# Patient Record
Sex: MALE | Race: WHITE | Employment: OTHER
[De-identification: names, ages, dates, MRNs, and addresses within clinical notes are randomized per-mention and may not be internally consistent; named-entity substitution may affect disease eponyms.]

---

## 2017-01-23 ENCOUNTER — OFFICE VISIT (OUTPATIENT)
Dept: FAMILY MEDICINE CLINIC | Facility: CLINIC | Age: 57
End: 2017-01-23

## 2017-01-23 VITALS
BODY MASS INDEX: 34.36 KG/M2 | DIASTOLIC BLOOD PRESSURE: 100 MMHG | WEIGHT: 232 LBS | SYSTOLIC BLOOD PRESSURE: 150 MMHG | HEIGHT: 69 IN

## 2017-01-23 DIAGNOSIS — M25.841 CYST OF JOINT OF HAND, RIGHT: Primary | ICD-10-CM

## 2017-01-23 DIAGNOSIS — M25.511 ACUTE PAIN OF RIGHT SHOULDER: ICD-10-CM

## 2017-01-23 PROCEDURE — 99203 OFFICE O/P NEW LOW 30 MIN: CPT | Performed by: FAMILY MEDICINE

## 2017-01-23 RX ORDER — TRAZODONE HYDROCHLORIDE 50 MG/1
50 TABLET ORAL NIGHTLY
COMMUNITY
End: 2017-09-27 | Stop reason: SDUPTHER

## 2017-01-23 RX ORDER — METHYLPREDNISOLONE 4 MG/1
TABLET ORAL
Qty: 1 KIT | Refills: 0 | Status: SHIPPED | OUTPATIENT
Start: 2017-01-23 | End: 2017-01-29

## 2017-01-23 RX ORDER — BACLOFEN 10 MG/1
10 TABLET ORAL 2 TIMES DAILY
Qty: 180 TABLET | Refills: 1 | Status: SHIPPED | OUTPATIENT
Start: 2017-01-23 | End: 2017-08-15 | Stop reason: SDUPTHER

## 2017-01-23 RX ORDER — FEXOFENADINE HCL AND PSEUDOEPHEDRINE HCI 60; 120 MG/1; MG/1
1 TABLET, EXTENDED RELEASE ORAL 2 TIMES DAILY
Qty: 180 TABLET | Refills: 1 | Status: SHIPPED | OUTPATIENT
Start: 2017-01-23 | End: 2017-08-15 | Stop reason: SDUPTHER

## 2017-01-23 ASSESSMENT — ENCOUNTER SYMPTOMS
WHEEZING: 0
EYE REDNESS: 0
VOMITING: 0
FACIAL SWELLING: 0
DIARRHEA: 0
EYE DISCHARGE: 0
BLOOD IN STOOL: 1
COUGH: 0
ABDOMINAL PAIN: 0
CONSTIPATION: 0

## 2017-01-23 ASSESSMENT — PATIENT HEALTH QUESTIONNAIRE - PHQ9
1. LITTLE INTEREST OR PLEASURE IN DOING THINGS: 1
2. FEELING DOWN, DEPRESSED OR HOPELESS: 1
SUM OF ALL RESPONSES TO PHQ QUESTIONS 1-9: 2
SUM OF ALL RESPONSES TO PHQ9 QUESTIONS 1 & 2: 2

## 2017-02-20 ENCOUNTER — OFFICE VISIT (OUTPATIENT)
Dept: FAMILY MEDICINE CLINIC | Facility: CLINIC | Age: 57
End: 2017-02-20

## 2017-02-20 VITALS
SYSTOLIC BLOOD PRESSURE: 138 MMHG | WEIGHT: 219 LBS | HEART RATE: 80 BPM | DIASTOLIC BLOOD PRESSURE: 80 MMHG | HEIGHT: 69 IN | BODY MASS INDEX: 32.44 KG/M2

## 2017-02-20 DIAGNOSIS — M25.511 ACUTE PAIN OF RIGHT SHOULDER: Primary | ICD-10-CM

## 2017-02-20 PROBLEM — M54.50 CHRONIC BILATERAL LOW BACK PAIN WITHOUT SCIATICA: Status: ACTIVE | Noted: 2017-02-20

## 2017-02-20 PROBLEM — G89.29 CHRONIC BILATERAL LOW BACK PAIN WITHOUT SCIATICA: Status: ACTIVE | Noted: 2017-02-20

## 2017-02-20 PROCEDURE — 99213 OFFICE O/P EST LOW 20 MIN: CPT | Performed by: FAMILY MEDICINE

## 2017-02-20 RX ORDER — MELOXICAM 15 MG/1
15 TABLET ORAL DAILY
Qty: 30 TABLET | Refills: 1 | Status: SHIPPED | OUTPATIENT
Start: 2017-02-20 | End: 2017-09-27

## 2017-02-20 ASSESSMENT — ENCOUNTER SYMPTOMS
VOMITING: 0
ABDOMINAL PAIN: 0
SHORTNESS OF BREATH: 0
NAUSEA: 0
EYE REDNESS: 0
EYE DISCHARGE: 0
COUGH: 0
DIARRHEA: 0

## 2017-04-06 ENCOUNTER — TELEPHONE (OUTPATIENT)
Dept: FAMILY MEDICINE CLINIC | Age: 57
End: 2017-04-06

## 2017-08-15 RX ORDER — FEXOFENADINE HCL AND PSEUDOEPHEDRINE HCI 60; 120 MG/1; MG/1
1 TABLET, EXTENDED RELEASE ORAL 2 TIMES DAILY
Qty: 180 TABLET | Refills: 1 | Status: SHIPPED | OUTPATIENT
Start: 2017-08-15 | End: 2018-02-19 | Stop reason: SDUPTHER

## 2017-08-15 RX ORDER — BACLOFEN 10 MG/1
10 TABLET ORAL 2 TIMES DAILY
Qty: 180 TABLET | Refills: 1 | Status: SHIPPED | OUTPATIENT
Start: 2017-08-15 | End: 2018-02-19 | Stop reason: SDUPTHER

## 2017-09-27 ENCOUNTER — OFFICE VISIT (OUTPATIENT)
Dept: FAMILY MEDICINE CLINIC | Age: 57
End: 2017-09-27
Payer: COMMERCIAL

## 2017-09-27 VITALS
HEIGHT: 68 IN | WEIGHT: 218 LBS | SYSTOLIC BLOOD PRESSURE: 120 MMHG | BODY MASS INDEX: 33.04 KG/M2 | DIASTOLIC BLOOD PRESSURE: 80 MMHG

## 2017-09-27 DIAGNOSIS — M19.011 PRIMARY OSTEOARTHRITIS OF RIGHT SHOULDER: ICD-10-CM

## 2017-09-27 DIAGNOSIS — Z23 NEED FOR INFLUENZA VACCINATION: ICD-10-CM

## 2017-09-27 DIAGNOSIS — G47.33 OBSTRUCTIVE SLEEP APNEA SYNDROME: ICD-10-CM

## 2017-09-27 DIAGNOSIS — R07.89 OTHER CHEST PAIN: Primary | ICD-10-CM

## 2017-09-27 DIAGNOSIS — K63.5 HYPERPLASTIC POLYP OF DESCENDING COLON: ICD-10-CM

## 2017-09-27 PROCEDURE — 99214 OFFICE O/P EST MOD 30 MIN: CPT | Performed by: FAMILY MEDICINE

## 2017-09-27 PROCEDURE — 90686 IIV4 VACC NO PRSV 0.5 ML IM: CPT | Performed by: FAMILY MEDICINE

## 2017-09-27 PROCEDURE — 90471 IMMUNIZATION ADMIN: CPT | Performed by: FAMILY MEDICINE

## 2017-09-27 RX ORDER — TRAZODONE HYDROCHLORIDE 50 MG/1
50 TABLET ORAL NIGHTLY
Qty: 90 TABLET | Refills: 1 | Status: SHIPPED | OUTPATIENT
Start: 2017-09-27 | End: 2020-04-06 | Stop reason: SDUPTHER

## 2017-09-27 ASSESSMENT — ENCOUNTER SYMPTOMS
ABDOMINAL PAIN: 0
BLOOD IN STOOL: 0
COUGH: 0
FACIAL SWELLING: 0
VOMITING: 0
EYE REDNESS: 0
TROUBLE SWALLOWING: 0
EYE DISCHARGE: 0
NAUSEA: 0
SHORTNESS OF BREATH: 0
CONSTIPATION: 0
DIARRHEA: 0
HEMOPTYSIS: 0

## 2017-10-17 ENCOUNTER — INITIAL CONSULT (OUTPATIENT)
Dept: SURGERY | Age: 57
End: 2017-10-17

## 2017-10-17 VITALS — WEIGHT: 222.6 LBS | HEART RATE: 70 BPM | RESPIRATION RATE: 16 BRPM | HEIGHT: 70 IN | BODY MASS INDEX: 31.87 KG/M2

## 2017-10-17 DIAGNOSIS — Z01.818 PREPROCEDURAL EXAMINATION: ICD-10-CM

## 2017-10-17 DIAGNOSIS — Z86.010 HISTORY OF COLON POLYPS: Primary | ICD-10-CM

## 2017-10-17 PROCEDURE — 99024 POSTOP FOLLOW-UP VISIT: CPT | Performed by: SURGERY

## 2017-10-23 ENCOUNTER — HOSPITAL ENCOUNTER (OUTPATIENT)
Age: 57
Discharge: HOME | End: 2017-10-23
Payer: COMMERCIAL

## 2017-10-23 LAB
EKG ATRIAL RATE: 72 BPM
EKG P AXIS: 49 DEGREES
EKG P-R INTERVAL: 142 MS
EKG Q-T INTERVAL: 394 MS
EKG QRS DURATION: 100 MS
EKG QTC CALCULATION (BAZETT): 431 MS
EKG R AXIS: 53 DEGREES
EKG T AXIS: 65 DEGREES
EKG VENTRICULAR RATE: 72 BPM

## 2017-10-23 PROCEDURE — 93005 ELECTROCARDIOGRAM TRACING: CPT

## 2017-10-31 ENCOUNTER — ANESTHESIA EVENT (OUTPATIENT)
Dept: OPERATING ROOM | Age: 57
End: 2017-10-31
Payer: COMMERCIAL

## 2017-11-01 RX ORDER — SODIUM, POTASSIUM,MAG SULFATES 17.5-3.13G
1 SOLUTION, RECONSTITUTED, ORAL ORAL ONCE
Qty: 2 BOTTLE | Refills: 0 | Status: SHIPPED | OUTPATIENT
Start: 2017-11-01 | End: 2017-11-01

## 2017-11-01 ASSESSMENT — ENCOUNTER SYMPTOMS
SORE THROAT: 0
ABDOMINAL PAIN: 0
NAUSEA: 0
CHOKING: 0
TROUBLE SWALLOWING: 0
BLOOD IN STOOL: 0
VOMITING: 0
COUGH: 0
BACK PAIN: 0
SHORTNESS OF BREATH: 0

## 2017-11-02 NOTE — COMMUNICATION BODY
Assessment:     1. History of colon polyps     2. Preprocedural examination  EKG 12 Lead         Plan: Will proceed with screening colonoscopy over the next few weeks.   Risks, benefits, alternatives thoroughly reviewed and accepted by Mr Nakita Chávez, including remote risk of GI bleeding, perforation, missed lesions, etc.

## 2017-11-02 NOTE — PATIENT INSTRUCTIONS
Learning About Colonoscopy  What is a colonoscopy? A colonoscopy is a test (also called a procedure) that lets a doctor look inside your large intestine. The doctor uses a thin, lighted tube called a colonoscope. The doctor uses it to look for small growths called polyps, colon or rectal cancer (colorectal cancer), or other problems like bleeding. During the procedure, the doctor can take samples of tissue. The samples can then be checked for cancer or other conditions. The doctor can also take out polyps. How is colonoscopy done? This procedure is done in a doctor's office or a clinic or hospital. You will get medicine to help you relax and not feel pain. Some people find that they do not remember having the test because of the medicine. The doctor gently moves the colonoscope, or scope, through the colon. The scope is also a small video camera. It lets the doctor see the colon and take pictures. A colonoscopy usually takes 30 to 45 minutes. It may take longer if the doctor has to remove polyps. How do you prepare for the procedure? You need to clean out your colon before the procedure so the doctor can see all of your colon. You may start the cleaning process a day or two before the test. This depends on which \"colon prep\" your doctor recommends. To clean your colon, you stop eating solid foods and drink only clear liquids. You can have water, tea, coffee, clear juices, clear broths, flavored ice pops, and gelatin (such as Jell-O). Do not drink anything red or purple, such as grape juice or fruit punch. And do not eat red or purple foods, such as grape ice pops or cherry gelatin. The day or night before the procedure, you drink a large amount of a special liquid. This causes loose, frequent stools. You will go to the bathroom a lot. It is very important to drink all of the colon prep liquid. If you have problems drinking the liquid, call your doctor.   For many people, the prep is worse than the test. It may be uncomfortable, and you may feel hungry on the clear liquid diet. Some people do not go to work or do their usual activities on the day of the prep. Arrange to have someone take you home after the test.  What can you expect after a colonoscopy? The nurses will watch you for 1 to 2 hours until the medicines wear off. Then you can go home. You will need a ride. Your doctor will tell you when you can eat and do your usual activities. Your doctor will talk to you about when you will need your next colonoscopy. The results of your test and your risk for colorectal cancer will help your doctor decide how often you need to be checked. Follow-up care is a key part of your treatment and safety. Be sure to make and go to all appointments, and call your doctor if you are having problems. It's also a good idea to know your test results and keep a list of the medicines you take. Where can you learn more? Go to https://[employer].ThisLife. org and sign in to your [employer] account. Enter K262 in the [facility] box to learn more about \"Learning About Colonoscopy. \"     If you do not have an account, please click on the \"Sign Up Now\" link. Current as of: July 26, 2016  Content Version: 11.3  © 0753-6367 [employer]. Care instructions adapted under license by [facility] [address] If you have questions about a medical condition or this instruction, always ask your healthcare professional. [place] [address] any warranty or liability for your use of this information.

## 2017-11-02 NOTE — PROGRESS NOTES
Subjective:      Patient ID: Hiram Johnson is a 56 y.o. male. CC  History colon polyps    HPI     Mr [address] is a 55 yo WM kindly referred to me by Dr Octavia Cosby for a screening colonoscopy. He had colon polyps removed in 2011 and 2014, as he recalls. No complaints. No abdominal pain. No recent weight changes. Normal appetite. Daily BM's, formed and brown, without blood. No family history of GI malignancy. 5 brothers, 4 sisters, 2 sons. He does not smoke. Review of Systems   Constitutional: Negative for activity change, appetite change, chills, fever and unexpected weight change. HENT: Negative for nosebleeds, sneezing, sore throat and trouble swallowing. Eyes: Negative for visual disturbance. Respiratory: Negative for cough, choking and shortness of breath. Cardiovascular: Negative for chest pain, palpitations and leg swelling. Gastrointestinal: Negative for abdominal pain, blood in stool, nausea and vomiting. Genitourinary: Negative for dysuria, flank pain and hematuria. Musculoskeletal: Negative for back pain, gait problem and myalgias. Allergic/Immunologic: Negative for immunocompromised state. Neurological: Negative for dizziness, seizures, syncope, weakness and headaches. Hematological: Does not bruise/bleed easily. Psychiatric/Behavioral: Negative for confusion and sleep disturbance.         Past Medical History:   Diagnosis Date    Back pain     chronic back pain    Osteoarthritis     Pneumonia     walking pneumonia    Sleep apnea        Past Surgical History:   Procedure Laterality Date    BACK SURGERY  2000    CERVICAL FUSION  2007    COLONOSCOPY      COLONOSCOPY  05-    Dr. Radha Knott (Biopsies)    KNEE CARTILAGE SURGERY Bilateral        Family History   Problem Relation Age of Onset    Other Mother      dementia    Heart Disease Father     Heart Disease Brother        Allergies:  See list    Current Outpatient Prescriptions   Medication Sig Dispense Refill    Na Sulfate-K Sulfate-Mg Sulf (SUPREP BOWEL PREP KIT) 17.5-3.13-1.6 GM/180ML SOLN Take 1 kit by mouth once for 1 dose 2 Bottle 0    traZODone (DESYREL) 50 MG tablet Take 1 tablet by mouth nightly 90 tablet 1    baclofen (LIORESAL) 10 MG tablet Take 1 tablet by mouth 2 times daily 180 tablet 1    fexofenadine-pseudoephedrine (ALLEGRA-D 12HR)  MG per extended release tablet Take 1 tablet by mouth 2 times daily 180 tablet 1    CPAP Machine MISC by Does not apply route nightly      Naproxen Sodium (ALEVE) 220 MG CAPS Take 1 tablet by mouth 2 times daily as needed for Pain. No current facility-administered medications for this visit. Social History     Social History    Marital status:      Spouse name: N/A    Number of children: N/A    Years of education: N/A     Social History Main Topics    Smoking status: Former Smoker    Smokeless tobacco: Former User     Types: Chew    Alcohol use No    Drug use: No    Sexual activity: Not Asked     Other Topics Concern    None     Social History Narrative    None       Objective:   Physical Exam   Constitutional: He is oriented to person, place, and time. He appears well-developed and well-nourished. HENT:   Head: Normocephalic and atraumatic. Mouth/Throat: Oropharynx is clear and moist.   Eyes: Conjunctivae and EOM are normal. Pupils are equal, round, and reactive to light. No scleral icterus. Neck: Normal range of motion. Neck supple. No JVD present. No tracheal deviation present. Cardiovascular: Normal rate and regular rhythm. Pulmonary/Chest: Effort normal and breath sounds normal. No respiratory distress. He exhibits no tenderness. Abdominal: Soft. Bowel sounds are normal. He exhibits no distension and no mass. There is no tenderness. There is no rebound and no guarding. Musculoskeletal: Normal range of motion. He exhibits no edema. Lymphadenopathy:     He has no cervical adenopathy.    Neurological: He is

## 2017-11-06 ENCOUNTER — ANESTHESIA (OUTPATIENT)
Dept: OPERATING ROOM | Age: 57
End: 2017-11-06
Payer: COMMERCIAL

## 2017-11-06 ENCOUNTER — HOSPITAL ENCOUNTER (OUTPATIENT)
Age: 57
Setting detail: OUTPATIENT SURGERY
Discharge: HOME | End: 2017-11-06
Attending: SURGERY | Admitting: SURGERY
Payer: COMMERCIAL

## 2017-11-06 VITALS
DIASTOLIC BLOOD PRESSURE: 58 MMHG | RESPIRATION RATE: 16 BRPM | HEIGHT: 70 IN | TEMPERATURE: 97.9 F | SYSTOLIC BLOOD PRESSURE: 126 MMHG | WEIGHT: 211.6 LBS | OXYGEN SATURATION: 96 % | HEART RATE: 62 BPM | BODY MASS INDEX: 30.29 KG/M2

## 2017-11-06 VITALS — DIASTOLIC BLOOD PRESSURE: 71 MMHG | SYSTOLIC BLOOD PRESSURE: 116 MMHG | OXYGEN SATURATION: 95 %

## 2017-11-06 PROBLEM — Z86.0100 HISTORY OF COLON POLYPS: Status: ACTIVE | Noted: 2017-11-06

## 2017-11-06 PROBLEM — Z12.11 ENCOUNTER FOR SCREENING COLONOSCOPY: Status: ACTIVE | Noted: 2017-11-06

## 2017-11-06 PROBLEM — Z86.010 HISTORY OF COLON POLYPS: Status: ACTIVE | Noted: 2017-11-06

## 2017-11-06 PROCEDURE — 88305 TISSUE EXAM BY PATHOLOGIST: CPT

## 2017-11-06 PROCEDURE — 6360000002 HC RX W HCPCS

## 2017-11-06 PROCEDURE — C1773 RET DEV, INSERTABLE: HCPCS | Performed by: SURGERY

## 2017-11-06 PROCEDURE — 7100000001 HC PACU RECOVERY - ADDTL 15 MIN: Performed by: SURGERY

## 2017-11-06 PROCEDURE — 7100000010 HC PHASE II RECOVERY - FIRST 15 MIN: Performed by: SURGERY

## 2017-11-06 PROCEDURE — 3700000001 HC ADD 15 MINUTES (ANESTHESIA): Performed by: SURGERY

## 2017-11-06 PROCEDURE — 6360000002 HC RX W HCPCS: Performed by: NURSE ANESTHETIST, CERTIFIED REGISTERED

## 2017-11-06 PROCEDURE — 2580000003 HC RX 258: Performed by: SURGERY

## 2017-11-06 PROCEDURE — 7100000000 HC PACU RECOVERY - FIRST 15 MIN: Performed by: SURGERY

## 2017-11-06 PROCEDURE — 7100000011 HC PHASE II RECOVERY - ADDTL 15 MIN: Performed by: SURGERY

## 2017-11-06 PROCEDURE — 3700000000 HC ANESTHESIA ATTENDED CARE: Performed by: SURGERY

## 2017-11-06 PROCEDURE — 3609010300 HC COLONOSCOPY W/BIOPSY SINGLE/MULTIPLE: Performed by: SURGERY

## 2017-11-06 RX ORDER — SODIUM CHLORIDE, SODIUM LACTATE, POTASSIUM CHLORIDE, CALCIUM CHLORIDE 600; 310; 30; 20 MG/100ML; MG/100ML; MG/100ML; MG/100ML
INJECTION, SOLUTION INTRAVENOUS CONTINUOUS
Status: DISCONTINUED | OUTPATIENT
Start: 2017-11-06 | End: 2017-11-06 | Stop reason: HOSPADM

## 2017-11-06 RX ORDER — PROPOFOL 10 MG/ML
INJECTION, EMULSION INTRAVENOUS CONTINUOUS PRN
Status: DISCONTINUED | OUTPATIENT
Start: 2017-11-06 | End: 2017-11-06 | Stop reason: SDUPTHER

## 2017-11-06 RX ORDER — ONDANSETRON 2 MG/ML
4 INJECTION INTRAMUSCULAR; INTRAVENOUS EVERY 6 HOURS PRN
Status: DISCONTINUED | OUTPATIENT
Start: 2017-11-06 | End: 2017-11-06 | Stop reason: HOSPADM

## 2017-11-06 RX ORDER — 0.9 % SODIUM CHLORIDE 0.9 %
10 VIAL (ML) INJECTION PRN
Status: DISCONTINUED | OUTPATIENT
Start: 2017-11-06 | End: 2017-11-06 | Stop reason: HOSPADM

## 2017-11-06 RX ORDER — SODIUM CHLORIDE, SODIUM LACTATE, POTASSIUM CHLORIDE, CALCIUM CHLORIDE 600; 310; 30; 20 MG/100ML; MG/100ML; MG/100ML; MG/100ML
INJECTION, SOLUTION INTRAVENOUS CONTINUOUS
Status: DISCONTINUED | OUTPATIENT
Start: 2017-11-06 | End: 2017-11-06 | Stop reason: SDUPTHER

## 2017-11-06 RX ORDER — SODIUM CHLORIDE 0.9 % (FLUSH) 0.9 %
10 SYRINGE (ML) INJECTION PRN
Status: DISCONTINUED | OUTPATIENT
Start: 2017-11-06 | End: 2017-11-06 | Stop reason: HOSPADM

## 2017-11-06 RX ORDER — SODIUM CHLORIDE 0.9 % (FLUSH) 0.9 %
10 SYRINGE (ML) INJECTION EVERY 12 HOURS SCHEDULED
Status: DISCONTINUED | OUTPATIENT
Start: 2017-11-06 | End: 2017-11-06 | Stop reason: HOSPADM

## 2017-11-06 RX ORDER — 0.9 % SODIUM CHLORIDE 0.9 %
10 VIAL (ML) INJECTION EVERY 12 HOURS SCHEDULED
Status: DISCONTINUED | OUTPATIENT
Start: 2017-11-06 | End: 2017-11-06 | Stop reason: HOSPADM

## 2017-11-06 RX ORDER — MIDAZOLAM HYDROCHLORIDE 1 MG/ML
INJECTION INTRAMUSCULAR; INTRAVENOUS PRN
Status: DISCONTINUED | OUTPATIENT
Start: 2017-11-06 | End: 2017-11-06 | Stop reason: SDUPTHER

## 2017-11-06 RX ADMIN — PROPOFOL 180 MCG/KG/MIN: 10 INJECTION, EMULSION INTRAVENOUS at 09:44

## 2017-11-06 RX ADMIN — MIDAZOLAM HYDROCHLORIDE 2 MG: 2 INJECTION, SOLUTION INTRAMUSCULAR; INTRAVENOUS at 09:41

## 2017-11-06 RX ADMIN — SODIUM CHLORIDE, POTASSIUM CHLORIDE, SODIUM LACTATE AND CALCIUM CHLORIDE: 600; 310; 30; 20 INJECTION, SOLUTION INTRAVENOUS at 08:09

## 2017-11-06 ASSESSMENT — PAIN SCALES - GENERAL
PAINLEVEL_OUTOF10: 0

## 2017-11-06 ASSESSMENT — PAIN - FUNCTIONAL ASSESSMENT: PAIN_FUNCTIONAL_ASSESSMENT: 0-10

## 2017-11-06 NOTE — OP NOTE
[facility]                           [address], [place]                               OPERATIVE REPORT    PATIENT NAME: Aris Rosado                     :             1960  MED REC NO:   [id number]                               ROOM:  ACCOUNT NO:   [de-identified]                            ADMISSION DATE:  2017  PROVIDER:     Robert Mcclellan    DATE OF PROCEDURE:  2017    ATTENDING SURGEON:  Dr. Robert Mcclellan. PCP:  Dr. Isaac Mooney. PREOPERATIVE DIAGNOSIS:  History of colon polyps. POSTOPERATIVE DIAGNOSES:  1.  Small sessile polyps x2 (transverse, sigmoid). 2.  Sigmoid diverticulosis. OPERATION PERFORMED:  1. Colonoscopy, anus to cecum. 2.  Sessile polypectomy x2 (transverse, sigmoid). ANESTHESIA:  MAC.    INDICATIONS:  The patient is a pleasant 53-year-old white male,  referred to me by Dr. Dione Marin for screening colonoscopy. He has had  colon polyps in the past.  At this time, endoscopy is indicated. OPERATIVE PROCEDURE:  After obtaining informed consent with discussion  of risks, benefits, alternatives including a remote risk of GI  perforation and missed lesions, the patient was taken to the endoscopy  suite and placed in the left lateral recumbent position. Following  adequate IV sedation, a digital rectal exam was performed. Sphincter  tone was normal.  Prostate was mildly enlarged with no discrete  masses. A colonoscope was passed transanally into the rectum and  advanced with gently insufflation throughout the entirety transanally  into the rectum and advanced with gentle insufflation throughout the  entirety of the colon to the cecum. Cecal position was confirmed by  clear visualization of the ileocecal valve, light in the right lower  quadrant, and transduction of manual pressure in the right lower  quadrant to the cecum. The bowel prep was excellent.   All colonic  mucosa was

## 2017-11-06 NOTE — BRIEF OP NOTE
Brief Postoperative Note  ______________________________________________________________    Patient: Amarjit Palma  YOB: 1960  MRN: [id number]  Date of Procedure: 11/6/2017    Pre-Op Diagnosis:      1. History colon polyps     2. Screening colonoscopy    Post-Op Diagnosis:      1. Small sessile polyps x2  (transverse, sigmoid)     2. Sigmoid diverticulosis       Procedure(s):      1. Colonoscopy anus to cecum     2. Sessile polypectomies x2 (transverse, sigmoid)    Anesthesia: Monitor Anesthesia Care    Surgeon(s):  Vivek Khoury MD    Staff:  Scrub Person First: Karo Aburto     Estimated Blood Loss:  Less than 43CN    Complications: None    Specimens:       1. Sessile polyps x2 (transverse, sigmoid)    Findings:   As above. Colonoscopy 4-5 years.     Dictated # [id number]    Vivek Khoury MD  Date: 11/6/2017  Time: 8:49 AM

## 2017-11-08 LAB — SURGICAL PATHOLOGY REPORT: NORMAL

## 2017-11-10 LAB
ALT SERPL-CCNC: 35 U/L
BUN BLDV-MCNC: 18 MG/DL
CALCIUM SERPL-MCNC: 9.4 MG/DL
CHLORIDE BLD-SCNC: 102 MMOL/L
CHOLESTEROL, TOTAL: 188 MG/DL
CHOLESTEROL/HDL RATIO: 3.9
CO2: 29 MMOL/L
CREAT SERPL-MCNC: 0.86 MG/DL
GFR CALCULATED: >60
GLUCOSE BLD-MCNC: 127 MG/DL
HDLC SERPL-MCNC: 48 MG/DL (ref 35–70)
LDL CHOLESTEROL CALCULATED: 100 MG/DL (ref 0–160)
POTASSIUM SERPL-SCNC: 4.1 MMOL/L
PROSTATE SPECIFIC ANTIGEN: 0.5 NG/ML
SODIUM BLD-SCNC: 142 MMOL/L
TRIGL SERPL-MCNC: 201 MG/DL
VLDLC SERPL CALC-MCNC: NORMAL MG/DL

## 2018-02-18 ENCOUNTER — PATIENT MESSAGE (OUTPATIENT)
Dept: FAMILY MEDICINE CLINIC | Age: 58
End: 2018-02-18

## 2018-02-19 RX ORDER — FEXOFENADINE HCL AND PSEUDOEPHEDRINE HCI 60; 120 MG/1; MG/1
1 TABLET, EXTENDED RELEASE ORAL 2 TIMES DAILY
Qty: 180 TABLET | Refills: 1 | Status: SHIPPED | OUTPATIENT
Start: 2018-02-19 | End: 2018-08-27 | Stop reason: SDUPTHER

## 2018-02-19 RX ORDER — BACLOFEN 10 MG/1
10 TABLET ORAL 2 TIMES DAILY
Qty: 180 TABLET | Refills: 1 | Status: SHIPPED | OUTPATIENT
Start: 2018-02-19 | End: 2018-08-27 | Stop reason: SDUPTHER

## 2018-04-12 PROBLEM — Z12.11 ENCOUNTER FOR SCREENING COLONOSCOPY: Status: RESOLVED | Noted: 2017-11-06 | Resolved: 2018-04-12

## 2018-08-24 ENCOUNTER — PATIENT MESSAGE (OUTPATIENT)
Dept: FAMILY MEDICINE CLINIC | Age: 58
End: 2018-08-24

## 2018-08-27 RX ORDER — BACLOFEN 10 MG/1
10 TABLET ORAL 2 TIMES DAILY
Qty: 180 TABLET | Refills: 0 | Status: SHIPPED | OUTPATIENT
Start: 2018-08-27 | End: 2018-09-05 | Stop reason: SDUPTHER

## 2018-08-27 RX ORDER — FEXOFENADINE HCL AND PSEUDOEPHEDRINE HCI 60; 120 MG/1; MG/1
1 TABLET, EXTENDED RELEASE ORAL 2 TIMES DAILY
Qty: 180 TABLET | Refills: 0 | Status: SHIPPED | OUTPATIENT
Start: 2018-08-27 | End: 2018-09-05 | Stop reason: SDUPTHER

## 2018-08-27 NOTE — TELEPHONE ENCOUNTER
From: Derrick Mendez  To: Mitch Diaz MD  Sent: 8/24/2018 8:38 PM EDT  Subject: Prescription Question    Need new script for baclofen 10mg, twice a day, and Allegra-d 12hr, 120mg twice a day. Thank you so much!!!    Erasmo Roman

## 2018-09-05 ENCOUNTER — OFFICE VISIT (OUTPATIENT)
Dept: FAMILY MEDICINE CLINIC | Age: 58
End: 2018-09-05
Payer: COMMERCIAL

## 2018-09-05 VITALS
BODY MASS INDEX: 33.18 KG/M2 | OXYGEN SATURATION: 97 % | HEIGHT: 69 IN | DIASTOLIC BLOOD PRESSURE: 80 MMHG | SYSTOLIC BLOOD PRESSURE: 136 MMHG | HEART RATE: 68 BPM | WEIGHT: 224 LBS

## 2018-09-05 DIAGNOSIS — G47.33 OBSTRUCTIVE SLEEP APNEA SYNDROME: ICD-10-CM

## 2018-09-05 DIAGNOSIS — J30.89 SEASONAL ALLERGIC RHINITIS DUE TO OTHER ALLERGIC TRIGGER: ICD-10-CM

## 2018-09-05 DIAGNOSIS — M19.90 ARTHRITIS: Primary | ICD-10-CM

## 2018-09-05 PROCEDURE — 99213 OFFICE O/P EST LOW 20 MIN: CPT | Performed by: FAMILY MEDICINE

## 2018-09-05 RX ORDER — BACLOFEN 10 MG/1
10 TABLET ORAL 2 TIMES DAILY
Qty: 180 TABLET | Refills: 1 | Status: SHIPPED | OUTPATIENT
Start: 2018-09-05 | End: 2019-04-09 | Stop reason: SDUPTHER

## 2018-09-05 RX ORDER — FEXOFENADINE HCL AND PSEUDOEPHEDRINE HCI 60; 120 MG/1; MG/1
1 TABLET, EXTENDED RELEASE ORAL 2 TIMES DAILY
Qty: 180 TABLET | Refills: 2 | Status: CANCELLED | OUTPATIENT
Start: 2018-09-05

## 2018-09-05 RX ORDER — BACLOFEN 10 MG/1
10 TABLET ORAL 2 TIMES DAILY
Qty: 180 TABLET | Refills: 1 | Status: CANCELLED | OUTPATIENT
Start: 2018-09-05

## 2018-09-05 RX ORDER — FEXOFENADINE HCL AND PSEUDOEPHEDRINE HCI 60; 120 MG/1; MG/1
1 TABLET, EXTENDED RELEASE ORAL 2 TIMES DAILY
Qty: 180 TABLET | Refills: 1 | Status: SHIPPED | OUTPATIENT
Start: 2018-09-05 | End: 2019-04-09 | Stop reason: SDUPTHER

## 2018-09-05 ASSESSMENT — PATIENT HEALTH QUESTIONNAIRE - PHQ9
SUM OF ALL RESPONSES TO PHQ QUESTIONS 1-9: 2
1. LITTLE INTEREST OR PLEASURE IN DOING THINGS: 2
SUM OF ALL RESPONSES TO PHQ9 QUESTIONS 1 & 2: 2
2. FEELING DOWN, DEPRESSED OR HOPELESS: 0
SUM OF ALL RESPONSES TO PHQ QUESTIONS 1-9: 2

## 2018-09-05 NOTE — PROGRESS NOTES
 COLONOSCOPY      COLONOSCOPY  05-    Dr. Qi Desai (Biopsies)    COLONOSCOPY  11/06/2017    DR Antonia Turcios    KNEE CARTILAGE SURGERY Bilateral     DC COLONOSCOPY W/BIOPSY SINGLE/MULTIPLE N/A 11/6/2017    COLONOSCOPY WITH BIOPSY/ polypectomies performed by Legrand Fleischer, MD at [facility] OR        Medications:       Prior to Admission medications    Medication Sig Start Date End Date Taking? Authorizing Provider   Nutritional Supplements (JOINT FORMULA PO) Take by mouth   Yes Historical Provider, MD   fexofenadine-pseudoephedrine (ALLEGRA-D 12HR)  MG per extended release tablet Take 1 tablet by mouth 2 times daily 9/5/18  Yes Martha Simental MD   baclofen (LIORESAL) 10 MG tablet Take 1 tablet by mouth 2 times daily 9/5/18  Yes Martha Simental MD   CPAP Machine MISC by Does not apply route nightly   Yes Historical Provider, MD   Naproxen Sodium (ALEVE) 220 MG CAPS Take 1 tablet by mouth 2 times daily as needed for Pain. Yes Historical Provider, MD   traZODone (DESYREL) 50 MG tablet Take 1 tablet by mouth nightly 9/27/17   Martha Simental MD        Allergies:       Darvocet a500 [propoxyphene n-acetaminophen]    Social History:     Tobacco:    reports that he quit smoking about 10 years ago. His smoking use included Cigarettes. He has a 37.50 pack-year smoking history. He quit smokeless tobacco use about 2 years ago. His smokeless tobacco use included Chew. Alcohol:      reports that he does not drink alcohol. Drug Use:  reports that he does not use drugs. Family History:     Family History   Problem Relation Age of Onset    Other Mother         dementia    Heart Disease Father     Heart Disease Brother        Review of Systems:       Review of Systems   Constitutional: Negative for chills, fatigue and fever. HENT: Positive for sneezing. Negative for congestion, facial swelling and trouble swallowing. Eyes: Negative for discharge and redness.    Respiratory: Negative for cough and

## 2018-09-07 PROBLEM — J30.9 ALLERGIC RHINITIS DUE TO ALLERGEN: Status: ACTIVE | Noted: 2018-09-07

## 2018-09-07 ASSESSMENT — ENCOUNTER SYMPTOMS
SHORTNESS OF BREATH: 0
CONSTIPATION: 0
NAUSEA: 0
FACIAL SWELLING: 0
BLOOD IN STOOL: 0
TROUBLE SWALLOWING: 0
EYE REDNESS: 0
BACK PAIN: 1
COUGH: 0
VOMITING: 0
EYE DISCHARGE: 0
DIARRHEA: 0
ABDOMINAL PAIN: 0

## 2018-10-01 ENCOUNTER — OFFICE VISIT (OUTPATIENT)
Dept: FAMILY MEDICINE CLINIC | Age: 58
End: 2018-10-01
Payer: COMMERCIAL

## 2018-10-01 VITALS
BODY MASS INDEX: 33.23 KG/M2 | SYSTOLIC BLOOD PRESSURE: 120 MMHG | HEART RATE: 92 BPM | OXYGEN SATURATION: 96 % | DIASTOLIC BLOOD PRESSURE: 80 MMHG | WEIGHT: 225 LBS

## 2018-10-01 DIAGNOSIS — H93.13 TINNITUS OF BOTH EARS: ICD-10-CM

## 2018-10-01 DIAGNOSIS — H61.22 IMPACTED CERUMEN OF LEFT EAR: ICD-10-CM

## 2018-10-01 DIAGNOSIS — R35.0 URINARY FREQUENCY: Primary | ICD-10-CM

## 2018-10-01 LAB
BILIRUBIN, POC: NEGATIVE
BLOOD URINE, POC: NEGATIVE
CLARITY, POC: CLEAR
COLOR, POC: YELLOW
GLUCOSE URINE, POC: NEGATIVE
KETONES, POC: NEGATIVE
LEUKOCYTE EST, POC: NEGATIVE
NITRITE, POC: NEGATIVE
PH, POC: 5
PROTEIN, POC: NEGATIVE
SPECIFIC GRAVITY, POC: 1.03
UROBILINOGEN, POC: 0.2

## 2018-10-01 PROCEDURE — 99213 OFFICE O/P EST LOW 20 MIN: CPT | Performed by: FAMILY MEDICINE

## 2018-10-01 PROCEDURE — 81002 URINALYSIS NONAUTO W/O SCOPE: CPT | Performed by: FAMILY MEDICINE

## 2018-10-01 RX ORDER — TAMSULOSIN HYDROCHLORIDE 0.4 MG/1
0.4 CAPSULE ORAL DAILY
Qty: 30 CAPSULE | Refills: 5 | Status: SHIPPED | OUTPATIENT
Start: 2018-10-01 | End: 2019-04-09 | Stop reason: SDUPTHER

## 2018-10-01 ASSESSMENT — ENCOUNTER SYMPTOMS
VOMITING: 0
DIARRHEA: 0
CONSTIPATION: 0
FACIAL SWELLING: 0

## 2018-10-01 NOTE — PROGRESS NOTES
HPI Notes    Name: Shawn Desai  : 1960         Chief Complaint:     Chief Complaint   Patient presents with    Urinary Frequency     Pt c/o urinary frequency over the past year. Pt states he gets up 5 - 6 times during the night. Pt  states he is planning to have PSA lab drawn at [facility] .    Tinnitus     Pt c/o ringing in left ear all the time for years, Pt is asking if anything to help . History of Present Illness: Shawn Desai is a 66 y.o.  male who presents with Urinary Frequency (Pt c/o urinary frequency over the past year. Pt states he gets up 5 - 6 times during the night. Pt  states he is planning to have PSA lab drawn at [facility] .) and Tinnitus (Pt c/o ringing in left ear all the time for years, Pt is asking if anything to help .)      HPI  Urinary frequency - pt states he has been going to the bathroom to urinate \"alot\". This has been going on for about one year. He goes about 5-6 times at night. Pt doesn't pay much attention during the but feels he does go a lot. Pt states his previous Dr Adolphus Mcardle told him he had an enlarged prostate in the past but pt has never taken any medicine of his own. Pt does admit he tried some flomax from a friend and it has helped. Pt states her took a flomax and only got up ONCE to urinate. No blood in urine and no burning with urination. Pt plans to have labs on  at the [facility]. Pt states he has PSA last year at [facility] but Never brought in results. Tinnitus - Lt ear that is ringing. Pt states the Lt ear has a lot of ringing compared to the Rt. Pt states it has been going on for years. Pt feels it is probably related to working at [employer] all those years. Pt has been dealing with this for year. Pt has had ear wax in the past.     Cerumen impaction - Pt has decreased hearing Lt ear.  Pt has had his wife clean out the wax from his ears in the past.   Past Medical History:     Past Medical History: Diagnosis Date    Back pain     chronic back pain    Osteoarthritis     Pneumonia     walking pneumonia    Sleep apnea       Reviewed all health maintenance requirements and ordered appropriate tests  Health Maintenance Due   Topic Date Due    Hepatitis C screen  1960    HIV screen  01/15/1977    DTaP/Tdap/Td vaccine (1 - Tdap) 01/15/1981    Lipid screen  01/15/2002    Diabetes screen  01/15/2002    Shingles Vaccine (1 of 2 - 2 Dose Series) 01/15/2012    Low dose CT lung screening  01/15/2017    Flu vaccine (1) 09/01/2018       Past Surgical History:     Past Surgical History:   Procedure Laterality Date    BACK SURGERY  2000    CERVICAL FUSION  2007    COLONOSCOPY      COLONOSCOPY  05-    Dr. Marilou Choi (Biopsies)    COLONOSCOPY  11/06/2017    DR Fish Marie    KNEE CARTILAGE SURGERY Bilateral     CT COLONOSCOPY W/BIOPSY SINGLE/MULTIPLE N/A 11/6/2017    COLONOSCOPY WITH BIOPSY/ polypectomies performed by Duke Rasmussen MD at [facility] OR        Medications:       Prior to Admission medications    Medication Sig Start Date End Date Taking? Authorizing Provider   tamsulosin (FLOMAX) 0.4 MG capsule Take 1 capsule by mouth daily 10/1/18  Yes Sue Gillette MD   Nutritional Supplements (JOINT FORMULA PO) Take by mouth   Yes Historical Provider, MD   fexofenadine-pseudoephedrine (ALLEGRA-D 12HR)  MG per extended release tablet Take 1 tablet by mouth 2 times daily 9/5/18  Yes Sue Gillette MD   baclofen (LIORESAL) 10 MG tablet Take 1 tablet by mouth 2 times daily 9/5/18  Yes Sue Gillette MD   traZODone (DESYREL) 50 MG tablet Take 1 tablet by mouth nightly  Patient taking differently: Take 50 mg by mouth nightly as needed  9/27/17  Yes Sue Gillette MD   Naproxen Sodium (ALEVE) 220 MG CAPS Take 1 tablet by mouth 2 times daily as needed for Pain.    Yes Historical Provider, MD   CPAP Machine MISC by Does not apply route nightly    Historical Provider, MD        Allergies:

## 2018-11-01 LAB
ALT SERPL-CCNC: 47 U/L
AST SERPL-CCNC: 31 U/L
AVERAGE GLUCOSE: NORMAL
BUN BLDV-MCNC: 22 MG/DL
CALCIUM SERPL-MCNC: 9.5 MG/DL
CHLORIDE BLD-SCNC: 104 MMOL/L
CHOLESTEROL, TOTAL: 188 MG/DL
CHOLESTEROL/HDL RATIO: 4
CO2: 23 MMOL/L
CREAT SERPL-MCNC: 0.88 MG/DL
GFR CALCULATED: >60
GLUCOSE BLD-MCNC: 169 MG/DL
HBA1C MFR BLD: 6.7 %
HDLC SERPL-MCNC: 47 MG/DL (ref 35–70)
LDL CHOLESTEROL CALCULATED: 117 MG/DL (ref 0–160)
POTASSIUM SERPL-SCNC: 4.4 MMOL/L
PROSTATE SPECIFIC ANTIGEN: 0.5 NG/ML
SODIUM BLD-SCNC: 143 MMOL/L
TRIGL SERPL-MCNC: 119 MG/DL
VLDLC SERPL CALC-MCNC: NORMAL MG/DL

## 2019-04-09 ENCOUNTER — OFFICE VISIT (OUTPATIENT)
Dept: FAMILY MEDICINE CLINIC | Age: 59
End: 2019-04-09
Payer: COMMERCIAL

## 2019-04-09 ENCOUNTER — TELEPHONE (OUTPATIENT)
Dept: FAMILY MEDICINE CLINIC | Age: 59
End: 2019-04-09

## 2019-04-09 VITALS
SYSTOLIC BLOOD PRESSURE: 122 MMHG | DIASTOLIC BLOOD PRESSURE: 80 MMHG | WEIGHT: 230 LBS | BODY MASS INDEX: 34.07 KG/M2 | HEIGHT: 69 IN

## 2019-04-09 DIAGNOSIS — M19.90 ARTHRITIS: ICD-10-CM

## 2019-04-09 DIAGNOSIS — J30.89 SEASONAL ALLERGIC RHINITIS DUE TO OTHER ALLERGIC TRIGGER: ICD-10-CM

## 2019-04-09 DIAGNOSIS — G47.33 OBSTRUCTIVE SLEEP APNEA SYNDROME: ICD-10-CM

## 2019-04-09 DIAGNOSIS — E11.9 TYPE 2 DIABETES MELLITUS WITHOUT COMPLICATION, WITHOUT LONG-TERM CURRENT USE OF INSULIN (HCC): Primary | ICD-10-CM

## 2019-04-09 LAB
CREATININE URINE POCT: NORMAL
HBA1C MFR BLD: 8.6 %
MICROALBUMIN/CREAT 24H UR: NORMAL MG/G{CREAT}
MICROALBUMIN/CREAT UR-RTO: <30

## 2019-04-09 PROCEDURE — 99214 OFFICE O/P EST MOD 30 MIN: CPT | Performed by: FAMILY MEDICINE

## 2019-04-09 PROCEDURE — 82044 UR ALBUMIN SEMIQUANTITATIVE: CPT | Performed by: FAMILY MEDICINE

## 2019-04-09 PROCEDURE — 83036 HEMOGLOBIN GLYCOSYLATED A1C: CPT | Performed by: FAMILY MEDICINE

## 2019-04-09 RX ORDER — TAMSULOSIN HYDROCHLORIDE 0.4 MG/1
0.4 CAPSULE ORAL DAILY
Qty: 90 CAPSULE | Refills: 1 | Status: SHIPPED | OUTPATIENT
Start: 2019-04-09 | End: 2019-10-07 | Stop reason: SDUPTHER

## 2019-04-09 RX ORDER — BACLOFEN 10 MG/1
10 TABLET ORAL 2 TIMES DAILY
Qty: 180 TABLET | Refills: 1 | Status: SHIPPED | OUTPATIENT
Start: 2019-04-09 | End: 2019-12-11 | Stop reason: SDUPTHER

## 2019-04-09 RX ORDER — FEXOFENADINE HCL AND PSEUDOEPHEDRINE HCI 60; 120 MG/1; MG/1
1 TABLET, EXTENDED RELEASE ORAL 2 TIMES DAILY
Qty: 180 TABLET | Refills: 1 | Status: SHIPPED | OUTPATIENT
Start: 2019-04-09 | End: 2019-10-07 | Stop reason: SDUPTHER

## 2019-04-09 ASSESSMENT — ENCOUNTER SYMPTOMS
FACIAL SWELLING: 0
SHORTNESS OF BREATH: 0
TROUBLE SWALLOWING: 0
VISUAL CHANGE: 0
BLOOD IN STOOL: 0
CONSTIPATION: 0
VOMITING: 0
DIARRHEA: 0
BACK PAIN: 1
COUGH: 0
ABDOMINAL PAIN: 0
BLURRED VISION: 0
NAUSEA: 0
EYE REDNESS: 0
EYE DISCHARGE: 0

## 2019-04-09 ASSESSMENT — PATIENT HEALTH QUESTIONNAIRE - PHQ9
SUM OF ALL RESPONSES TO PHQ QUESTIONS 1-9: 0
2. FEELING DOWN, DEPRESSED OR HOPELESS: 0
1. LITTLE INTEREST OR PLEASURE IN DOING THINGS: 0
SUM OF ALL RESPONSES TO PHQ9 QUESTIONS 1 & 2: 0
SUM OF ALL RESPONSES TO PHQ QUESTIONS 1-9: 0

## 2019-04-09 NOTE — PROGRESS NOTES
HPI Notes    Name: Radha Virk  : 1960        Chief Complaint:     Chief Complaint   Patient presents with    Arthritis    Sleep Apnea    Other     seasonal allergies - chronic but stable, Pt takes Allegra        History of Present Illness: Radha Virk is a 64 y.o.  male who presents with Arthritis; Sleep Apnea; and Other (seasonal allergies - chronic but stable, Pt takes Allegra )      Diabetes   He presents for his follow-up diabetic visit. He has type 2 diabetes mellitus. His disease course has been worsening. There are no hypoglycemic associated symptoms. Pertinent negatives for hypoglycemia include no confusion, dizziness, headaches, pallor or tremors. Pertinent negatives for diabetes include no blurred vision, no chest pain, no fatigue, no foot paresthesias, no foot ulcerations, no polydipsia, no polyuria and no visual change. There are no hypoglycemic complications. Symptoms are stable. There are no diabetic complications. Risk factors for coronary artery disease include diabetes mellitus. When asked about current treatments, none were reported. He is compliant with treatment some of the time. His home blood glucose trend is increasing steadily (Hgba1c from 6.7 to 8.6. ). Arthritis - chronic but stable. Pt has chronic back pain for years. Pt takes the baclofen BID to help control the back. 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 Overall, pt also takes the alleve as needed for flare ups and heat. Seasonal allergies - chronic but stable. Pt take allegra daily. No increased runny nose or cough. Sleep apnea - Pt is doing well. Pt states he is not using his CPAP as he can't find a mask that fits. Pt has been to [facility] as those covered by insurance. Pt looking to get nasal one because pt moves around a lot at night due to his back pain. Pt needs something smaller on his face. Pt will check into [place] again.    Past Medical History: Past Medical History:   Diagnosis Date    Back pain     chronic back pain    Osteoarthritis     Pneumonia     walking pneumonia    Sleep apnea       Reviewed all health maintenance requirements and ordered appropriate tests  Health Maintenance Due   Topic Date Due    Hepatitis C screen  1960    Pneumococcal 0-64 years Vaccine (1 of 1 - PPSV23) 01/15/1968    Diabetic foot exam  01/15/1972    Diabetic retinal exam  01/15/1972    HIV screen  01/15/1977    Diabetic microalbuminuria test  01/15/1980    Hepatitis B Vaccine (1 of 3 - Risk 3-dose series) 01/15/1981    DTaP/Tdap/Td vaccine (1 - Tdap) 01/15/1981    Shingles Vaccine (1 of 2) 01/15/2012    Low dose CT lung screening  01/15/2017       Past Surgical History:     Past Surgical History:   Procedure Laterality Date    BACK SURGERY  2000    CERVICAL FUSION  2007    COLONOSCOPY      COLONOSCOPY  05-    Dr. Rocío Gonzales (Biopsies)    COLONOSCOPY  11/06/2017    DR Octavio Acosta    KNEE CARTILAGE SURGERY Bilateral     RI COLONOSCOPY W/BIOPSY SINGLE/MULTIPLE N/A 11/6/2017    COLONOSCOPY WITH BIOPSY/ polypectomies performed by Claire Webster MD at [facility] OR        Medications:       Prior to Admission medications    Medication Sig Start Date End Date Taking? Authorizing Provider   baclofen (LIORESAL) 10 MG tablet Take 1 tablet by mouth 2 times daily 4/9/19  Yes Gloria Hernandez MD   fexofenadine-pseudoephedrine (ALLEGRA-D 12HR)  MG per extended release tablet Take 1 tablet by mouth 2 times daily 4/9/19  Yes Gloria Hernandez MD   tamsulosin [facility]) 0.4 MG capsule Take 1 capsule by mouth daily 4/9/19  Yes Gloria Hernandez MD   metFORMIN (GLUCOPHAGE) 500 MG tablet Take 1 tablet by mouth daily (with breakfast) 4/9/19  Yes Gloria Hernandez MD   Nutritional Supplements (JOINT FORMULA PO) Take by mouth   Yes Historical Provider, MD   Naproxen Sodium (ALEVE) 220 MG CAPS Take 1 tablet by mouth 2 times daily as needed for Pain.    Yes Historical reactive to light. Conjunctivae are normal. Right eye exhibits no discharge. Left eye exhibits no discharge. Neck: Neck supple. No thyromegaly present. Cardiovascular: Normal rate, regular rhythm and normal heart sounds. No murmur heard. Pulmonary/Chest: Effort normal and breath sounds normal. No respiratory distress. He has no wheezes. Abdominal: Soft. Bowel sounds are normal. He exhibits no distension. There is no tenderness. Musculoskeletal: He exhibits no edema. Lumbar back: He exhibits tenderness. He exhibits no swelling and no edema. Lymphadenopathy:     He has no cervical adenopathy. Neurological: He is alert and oriented to person, place, and time. Skin: Skin is dry. No rash noted. No erythema. Psychiatric: He has a normal mood and affect. Vitals reviewed. Vitals:  /80   Ht 5' 9\" (1.753 m)   Wt 230 lb (104.3 kg)   BMI 33.97 kg/m²       Data:     Lab Results   Component Value Date     11/01/2018    K 4.4 11/01/2018     11/01/2018    CO2 23 11/01/2018    BUN 22 11/01/2018    CREATININE 0.88 11/01/2018    GLUCOSE 169 11/01/2018    AST 31 11/01/2018    ALT 47 11/01/2018     Lab Results   Component Value Date    WBC 7.1 05/23/2014    RBC 5.89 05/23/2014    HGB 17.1 05/23/2014    HCT 50.4 05/23/2014    MCV 85.6 05/23/2014    MCH 29.1 05/23/2014    MCHC 34.0 05/23/2014    RDW 12.8 05/23/2014     05/23/2014    MPV NOT REPORTED 05/23/2014     No results found for: TSH  Lab Results   Component Value Date    CHOL 188 11/01/2018    HDL 47 11/01/2018    PSA 0.50 11/01/2018    LABA1C 6.7 11/01/2018          Assessment/Plan:        1. Type 2 diabetes mellitus without complication, without long-term current use of insulin ([address]  F/U 3mos,in office, HgbA1C. Do daily foot checks and yearly eye exams  Had long d/w pt that he does have DM and Hgba1c way above 6.5. Pt really wants to try to improve diet and know he has NOT exercised all weekend.  SO reviewed DM diet some but pt referred to diabetic educator at [facility]. Start metformin 500mg one every AM to start but may need BID. [place] hgba1c in 3mos. Lipids and BP WNL so no other new meds but did talk about statins. Reviewed benefits and side effects of metformin. Pt to ck FSBS several days a week AM fasting or 2hr post prandial and record. 2. Arthritis  Stable on baclofen    3. Seasonal allergic rhinitis due to other allergic trigger  Stable on allegra    4. Obstructive sleep apnea syndrome  Pt to look into a more comfortable mask        Yovanny received counseling on the following healthy behaviors: nutrition and exercise  Reviewed prior labs and health maintenance  Continue current medications, diet and exercise. Discussed use, benefit, and side effects of prescribed medications. Barriers to medication compliance addressed. Patient given educational materials - see patient instructions  Was a self-tracking handout given in paper form or via [email or web address]? Yes    Requested Prescriptions     Signed Prescriptions Disp Refills    baclofen (LIORESAL) 10 MG tablet 180 tablet 1     Sig: Take 1 tablet by mouth 2 times daily    fexofenadine-pseudoephedrine (ALLEGRA-D 12HR)  MG per extended release tablet 180 tablet 1     Sig: Take 1 tablet by mouth 2 times daily    tamsulosin (FLOMAX) 0.4 MG capsule 90 capsule 1     Sig: Take 1 capsule by mouth daily    metFORMIN (GLUCOPHAGE) 500 MG tablet 30 tablet 2     Sig: Take 1 tablet by mouth daily (with breakfast)       All patient questions answered. Patient voiced understanding. Quality Measures    Body mass index is 33.97 kg/m². Elevated. Weight control planned discussed Healthy diet and regular exercise. BP: 122/80 Blood pressure is normal. Treatment plan consists of No treatment change needed.     Lab Results   Component Value Date    LDLCALC 117 11/01/2018    (goal LDL reduction with dx if diabetes is 50% LDL reduction)      PHQ Scores 4/9/2019 9/5/2018 1/23/2017 PHQ2 Score 0 2 2   PHQ9 Score 0 2 2     Interpretation of Total Score Depression Severity: 1-4 = Minimal depression, 5-9 = Mild depression, 10-14 = Moderate depression, 15-19 = Moderately severe depression, 20-27 = Severe depression      Return in about 3 months (around 7/9/2019) for DM.       Electronically signed by Karen Shepard MD on 4/9/2019 at 9:14 AM

## 2019-04-09 NOTE — PATIENT INSTRUCTIONS
SURVEY:    You may be receiving a survey from [employer] regarding your visit today. Please complete the survey to enable us to provide the highest quality of care to you and your family. If you cannot score us a very good on any question, please call the office to discuss how we could have made your experience a very good one. Thank you.

## 2019-05-01 ENCOUNTER — HOSPITAL ENCOUNTER (OUTPATIENT)
Dept: DIABETES SERVICES | Age: 59
Setting detail: THERAPIES SERIES
Discharge: HOME | End: 2019-05-01
Payer: COMMERCIAL

## 2019-05-01 VITALS — HEIGHT: 69 IN | BODY MASS INDEX: 33.12 KG/M2 | WEIGHT: 223.6 LBS

## 2019-05-01 PROCEDURE — G0109 DIAB MANAGE TRN IND/GROUP: HCPCS

## 2019-05-01 SDOH — ECONOMIC STABILITY: FOOD INSECURITY: ADDITIONAL INFORMATION: NO

## 2019-05-01 ASSESSMENT — PROBLEM AREAS IN DIABETES QUESTIONNAIRE (PAID)
FEELING THAT DIABETES IS TAKING UP TOO MUCH OF YOUR MENTAL AND PHYSICAL ENERGY EVERY DAY: 1
FEELING DEPRESSED WHEN YOU THINK ABOUT LIVING WITH DIABETES: 1
WORRYING ABOUT THE FUTURE AND THE POSSIBILITY OF SERIOUS COMPLICATIONS: 2
COPING WITH COMPLICATIONS OF DIABETES: 1
PAID-5 TOTAL SCORE: 6
FEELING SCARED WHEN YOU THINK ABOUT LIVING WITH DIABETES: 1

## 2019-05-01 ASSESSMENT — SLEEP AND FATIGUE QUESTIONNAIRES
HOW MANY HOURS OF SLEEP ARE YOU GETTING, ON AVERAGE: LESS THAN 7
HOW DO YOU RATE THE QUALITY OF YOUR SLEEP: POOR
HAVE YOU EVER BEEN TESTED FOR SLEEP APNEA: YES
HAVE YOU BEEN TOLD, OR NOTICED ON YOUR OWN, THAT YOU STOP BREATHING OR STRUGGLE TO BREATHE IN YOUR SLEEP: YES

## 2019-05-01 ASSESSMENT — PATIENT HEALTH QUESTIONNAIRE - PHQ9
SUM OF ALL RESPONSES TO PHQ QUESTIONS 1-9: 4
2. FEELING DOWN, DEPRESSED OR HOPELESS: 0
1. LITTLE INTEREST OR PLEASURE IN DOING THINGS: 1
SUM OF ALL RESPONSES TO PHQ9 QUESTIONS 1 & 2: 1
SUM OF ALL RESPONSES TO PHQ QUESTIONS 1-9: 4

## 2019-05-06 ENCOUNTER — HOSPITAL ENCOUNTER (OUTPATIENT)
Dept: DIABETES SERVICES | Age: 59
Setting detail: THERAPIES SERIES
Discharge: HOME | End: 2019-05-06
Payer: COMMERCIAL

## 2019-05-06 PROCEDURE — G0109 DIAB MANAGE TRN IND/GROUP: HCPCS

## 2019-05-06 NOTE — PROGRESS NOTES
Class 1. Conversation map [place] [facility] [place] Diabetes\" utilized. Pt actively participated in discussion, asking questions. Topics:  1. What diabetes is and some of the most common myths about diabetes  2. The feelings that you can have about diabetes  3. What blood glucose and insulin are  4. Monitoring your blood glucose and using the results  5. Managing diabetes with healthy eating, physical activity, and taking medicine  6. The importance of having a plan and engaging a support network and health care team        Diabetes Self-Management Education Record     Progress Note: Barry Gordon here alone for diabetes education assessment and group class 1. Barry Gordon was recently diagnosed with type 2 DM but admits he suspected it for a while. Barry Gordon is anxious to learn how to manage his diabetes. He is taking metformin 500 mg daily with breakfast with 2 missed doses. He is monitoring once daily in the morning with readings ranging 117-172. He skips meals, sometimes eating only one meal a day. States he and his wife have started the keto diet. He is trying to lose weight and has lost 15 lbs in approx one month. His exercise mainly consists of walking for one hour most days \"weather permitting. \"  He is currently working on remodeling an old [address] so is \"getting a lot of exercise there too. \" Will continue to follow and support as needed.  Encouraged to call with questions or concerns.         Participant Name: Radha Virk   Referring Provider:  Azeb Reyes MD     Keys to learning:  Considerations: []Language []Emotional []Health Literacy  []Cognitive []Memory changes []Financial []Cultural   []Taoist []Vision []Hearing  []Speech []Lack of desire  []Literacy  []Psycho-social  [x]None  If considerations are noted, accommodations made: N/A     Identified barriers to learning/self management: None     The following information was discussed:     [x] Diabetes disease process and treatment options   [x] A  [] M                       S. O.C  [] PRE  [] C  [] P      [] A  [] M    Current main goal attainment frequency:   [] Never  [x] Some  [] Half  [] Most  [] All     Participant confidence to master goal this visit:   [] Excellent  [x] Good  [] Fair  [] Poor                 Current main goal attainment frequency:   [] Never  [] Some  [] Half  [] Most  [] All     Participant confidence to master goal this visit:   [] Excellent  [] Good [] Fair  [] Poor                      Session  Topics & Learning Objectives:          Comments:   Diabetes disease process & Treatment process: Define diabetes & prediabetes; identify own type of diabetes; role of the pancreas; signs/symptoms; diagnostic criteria; prevention and treatment options; contributing factors.                          Rating  [x] 1  [] 2  [] 3  [] 4      [] NC  [] N/A    Rating  [] 1  [] 2  [] 3  [] 4  [] NC  [] N/A       Rating  [] 1  [] 2  [] 3  [] 4  [] NC  [] N/A       Rating  [] 1  [] 2  [] 3  [] 4  [] NC  [] N/A          5/1/19   Incorporating nutritional management into lifestyle: Describe effect of type, amount & timing of food on blood glucose; Describe basic meal planning techniques & current nutrition guidelines; Correctly read food labels & demonstrate CHO counting & portion control with personalized meal plan. Rating  [x] 1  [] 2  [] 3  [] 4  [] NC  [] N/A       Rating  [] 1  [] 2  [] 3  [] 4  [] NC  [] N/A       Rating  [] 1  [] 2  [] 3  [] 4  [] NC  [] N/A       Rating  [] 1  [] 2  [] 3  [] 4  [] NC  [] N/A          5/1/19 After diabetes diagnosis started keto diet and has lost 15 lbs. Discussed ADA recommendation of carbohydrates per meal.          Incorporating physical activity into lifestyle:   State effect of exercise on blood glucose levels. Identifies personal exercise plan and contraindications.     Discussed safety tips while exercising.                 Rating  [x] 1  [] 2  [] 3  [] 4  [] NC  [] N/A       Rating  [] 1  [] 2  [] 3  [] 4  [] NC  [] N/A          Rating  [] 1  [] 2  [] 3  [] 4  [] NC  [] N/A       Rating  [] 1  [] 2  [] 3  [] 4  [] NC  [] N/A        5/1/19 Walks at least one hour each day weather permitting.      Using medications safely: State effect of diabetes medicines on diabetes;   Name diabetes medication taking, action, timing & side effects.              ____________________________  Insulin/injectable-  Appropriate injection site; proper storage; proper technique; safe needle disposal.                      Rating  [x] 1  [] 2  [] 3  [] 4  [] NC  [] N/A        _______  Rating  [] 1  [] 2  [] 3  [] 4  [x] NC  [] N/A    Rating  [] 1  [] 2  [] 3  [] 4  [] NC  [] N/A        _______  Rating  [] 1  [] 2  [] 3  [] 4  [] NC  [] N/A    Rating  [] 1  [] 2  [] 3  [] 4  [] NC  [] N/A        _______  Rating  [] 1  [] 2  [] 3  [] 4  [] NC  [] N/A    Rating  [] 1  [] 2  [] 3  [] 4  [] NC  [] N/A        ________  Rating  [] 1  [] 2  [] 3  [] 4  [] NC  [] N/A    5/1/19                          ___________________________________________________________________________________   Monitoring blood glucose, interpreting and using results: Identify recommended blood glucose targets, personal targets, A1C target, importance of logging glucose,appropriate techniques and problem solving. Safe lancet disposal.    Rating  [x] 1  [] 2  [] 3  [] 4  [] NC  [] N/A       Rating  [] 1  [] 2  [] 3  [] 4  [] NC  [] N/A          Rating  [] 1  [] 2  [] 3  [] 4  [] NC  [] N/A       Rating  [] 1  [] 2  [] 3  [] 4  [] NC  [] N/A        5/1/19   Prevention, detection & treatment of acute complications: List symptoms of hyper- and hypoglycemia; Describe how to treat low blood sugar & actions for lowering high blood glucose levels. Prevention and treatment strategies.   ____________________________  Describe sick day guidelines and indications for physician notification.                      Rating  [] 1  [] 2  [] 3  [] 4  [x] NC  [] N/A     _______  Rating  [x] N/A        5/1/19         Time Spent with patient: 2.5 Hours   Plan  Follow-up Appointments planned in GROUP setting.      Next Appointment on 5/6/19.     Instruction Method: [x]Lecture/Discussion  []Power Point Presentation  [x]Handouts  []Return Demonstration        Education Materials/Equipment Provided:    [x]Self-Management - Initial assessment - ADA  Where do I Begin, Living with Type 2 diabetes\" book;  Diet meal planning basics, handout on diabetes education classes, hyper/hypoglycemia signs/symptoms and treatment handout; Diabetes zones;  Support plan resource list.         [x]Self-Management  Class 1 - Living Well with Diabetes\" Booklet     [] Self-Management  Class 2 - BD Booklet  Sick Day Surefire Social and [employer], recipe hand outs and tips, diabetes Cookbooks  ( when available)      (Meal Plan and handout for serving sizes, smarter snacking, Ready Set Carb Counting / Plate Method, Nutrient Conversion and International [address] Eating for People with Diabetes and Nutrition in the [employer] - fast facts about fast food)     [] Self-Management  Class 3 -  Diabetes ID card,  foot care tips sheet, Healthy I  Continuing Your Journey with Diabetes map handout, Individualized Diabetes report card        []Self-Management - 3 month follow - up  AADE booklet Side by Side a partnership approach to diabetes self- care, [place] Tobacco Quit line, [facility] Diabetes support program information sheet, [facility] [place] information sheet     []Glucose Meter      []Insulin Kit      []Other                   Handouts/Booklets given:      [x] Living Well with Diabetes    [x] Daily Diabetic Meal Planning Guide   [] Nutrition in the Fast [place]    [x] Resources for People With Diabetes   [x] Other: Log book        Diabetes Self Management Support Plan:        To assist and support your continued progress in managing your diabetes following    education     (  )  Gym or exercise program of your choice. (Suggestions:  [employer], Circuit training, any exercise facility and your local Physical                    Therapy or Cardiac Rehabilitation exercise Program )       (  )   Library        ( x )    ADA website:  BrowserReParkmobile.ca. org        (  )   Http: DotProtection.gl        (  )   Diabetes Forecast [place] you may get this information on the ADA web site.        (  )  Diabetes Interview - [place]   [phone]        (  )  Diabetes Self Management (bi-monthly magazine)  9-746.843.8070  ( x ) Support group: [place] first Tuesday of the month at 9 am and Lior ray third Wednesday of the month at 9 am     (  )  Health Journeys Image Paths  (relaxation tapes for people with Diabetes)          [phone]     (  )  Your suggestions:                           Ron Pennington RN  [facility]  Diabetes clinic educator  5/1/2019  3:25 PM

## 2019-05-06 NOTE — PROGRESS NOTES
Class 2. Conversation map  [facility] Blood Glucose\" utilized. Chio Gayle participated in group discussion.   Topics:  1 What blood glucose and insulin are  2 Blood glucose targets and how you feel when your blood glucose is in and out of your target ranges  3 Monitoring and knowing your A1C  4 What can make blood glucose go up and down and preventing high and low blood glucose  5 Using your monitoring results to manage your diabetes  6 Sick days with diabetes  7 Dining out and special events  8 Diabetes disaster planning     Participant Name: Yovanny Francis  Referring Provider:  Heather So MD     Keys to learning:  Considerations: []Language []Emotional []Health Literacy  []Cognitive []Memory changes []Financial []Cultural   []Scientology []Vision []Hearing  []Speech []Lack of desire  []Literacy  []Psycho-social  [x]None  If considerations are noted, accommodations made: N/A     Identified barriers to learning/self management: None     The following information was discussed:     [x] Diabetes disease process and treatment options   [x] Healthy nutrition, carbohydrate counting, meal planning  [x] Monitoring blood glucose and other parameters; interpreting and using results  [x] Acute complications--prevention, detection and treatment  [] Medication management and safety   [x] Incorporating physical activity into lifestyle   [x] Exercise for Health, Reducing Risks for Heart Disease, Diabetes and Heart Health  [] Preventing, through risk reduction behaviors, detecting, and treating chronic complications  [x] Sick Day management  [x] Developing personalized strategies to address psychosocial issues and concerns  [] Developing personalized strategies to promote health and behavior change through goalsetting, behavior change strategies aimed at risk reduction  [x] Special situations--disaster planning, travel, social activities     Session Assessment & Evaluation Ratings:  1=Needs Instruction  2=Needs Rating  [] 1  [] 2  [] 3  [] 4  [x] NC  [] N/A       Rating  [] 1  [] 2  [] 3  [] 4  [] NC  [] N/A       Rating  [] 1  [] 2  [] 3  [] 4  [] NC  [] N/A          5/1/19   Incorporating nutritional management into lifestyle: Describe effect of type, amount & timing of food on blood glucose; Describe basic meal planning techniques & current nutrition guidelines; Correctly read food labels & demonstrate CHO counting & portion control with personalized meal plan. Rating  [x] 1  [] 2  [] 3  [] 4  [] NC  [] N/A       Rating  [] 1  [x] 2  [] 3  [] 4  [] NC  [] N/A       Rating  [] 1  [] 2  [] 3  [] 4  [] NC  [] N/A       Rating  [] 1  [] 2  [] 3  [] 4  [] NC  [] N/A          5/1/19 After diabetes diagnosis started keto diet and has lost 15 lbs. Discussed ADA recommendation of carbohydrates per meal.   5/6/19   Incorporating physical activity into lifestyle:   State effect of exercise on blood glucose levels.  Identifies personal exercise plan and contraindications.    Discussed safety tips while exercising.                 Rating  [x] 1  [] 2  [] 3  [] 4  [] NC  [] N/A       Rating  [] 1  [x] 2  [] 3  [] 4  [] NC  [] N/A          Rating  [] 1  [] 2  [] 3  [] 4  [] NC  [] N/A       Rating  [] 1  [] 2  [] 3  [] 4  [] NC  [] N/A        5/1/19 Walks at least one hour each day weather permitting.   5/6/19 Discussed effect of exercise on blood glucose.      Using medications safely: State effect of diabetes medicines on diabetes;   Name diabetes medication taking, action, timing & side effects.              ____________________________  Insulin/injectable-  Appropriate injection site; proper storage; proper technique; safe needle disposal.                      Rating  [x] 1  [] 2  [] 3  [] 4  [] NC  [] N/A        _______  Rating  [] 1  [] 2  [] 3  [] 4  [x] NC  [] N/A    Rating  [] 1  [] 2  [] 3  [] 4  [x] NC  [] N/A        _______  Rating  [] 1  [] 2  [] 3  [] 4  [x] NC  [] N/A

## 2019-05-13 ENCOUNTER — HOSPITAL ENCOUNTER (OUTPATIENT)
Dept: NUTRITION | Age: 59
Discharge: HOME | End: 2019-05-13
Payer: COMMERCIAL

## 2019-05-13 ENCOUNTER — APPOINTMENT (OUTPATIENT)
Dept: DIABETES SERVICES | Age: 59
End: 2019-05-13
Payer: COMMERCIAL

## 2019-05-13 PROCEDURE — 97804 MEDICAL NUTRITION GROUP: CPT

## 2019-05-13 NOTE — PROGRESS NOTES
and vegetables      Continue with activity daily (5 days a week minimally)      Use MyPlate as guide to achieve better overall \"balance\" at meals      Use smartphone or computer to look up nutrition information for restaurants      Use kitchen scale          Expected compliance:  Good. Although client is likely to continue doing keto diet for know, he could recognize that finding balance long term could be helpful. Client followed along with accompanying materials during class and asked appropriate questions. He struggled with following along in book and food label reading, as he forgot his glasses. Client appears to be in a contemplative to action phase of change. Recommend follow up as needed. RD name and phone number provided. .    Thank you for the referral.    Electronically signed by Rashad Coates RD, LD on 5/13/2019 at 6:59 AM    Education session duration: 90 minutes.

## 2019-05-15 ENCOUNTER — HOSPITAL ENCOUNTER (OUTPATIENT)
Dept: DIABETES SERVICES | Age: 59
Setting detail: THERAPIES SERIES
Discharge: HOME | End: 2019-05-15
Payer: COMMERCIAL

## 2019-05-15 PROCEDURE — G0109 DIAB MANAGE TRN IND/GROUP: HCPCS

## 2019-05-15 NOTE — PROGRESS NOTES
Conversation map [address] with Diabetes\" utilized. Barry Gordon actively participated in group discussion. Asked questions. Topics:  1 The natural course of diabetes  2 Recognizing the fact that it may become more difficult to keep your blood glucose within your target range  3 The potential long-term complications of diabetes  4 How to delay or reduce the risk of long-term complications by keeping your blood glucose on target  5 The importance of checking for long-term complications and knowing your ABCs        6. How oral diabetes medications work         7. How other diabetes medications work        8.    Defining the different types of insulin       Participant Name: Yovanny Abarca Augusta  Referring Provider:  Heather Gallagher MD     Keys to learning:  Considerations: []Language []Emotional []Health Literacy  []Cognitive []Memory changes []Financial []Cultural   []Latter-day []Vision []Hearing  []Speech []Lack of desire  []Literacy  []Psycho-social  [x]None  If considerations are noted, accommodations made: N/A     Identified barriers to learning/self management: None     The following information was discussed:     [x] Diabetes disease process and treatment options   [x] Healthy nutrition, carbohydrate counting, meal planning  [x] Monitoring blood glucose and other parameters; interpreting and using results  [x] Acute complications--prevention, detection and treatment  [x] Medication management and safety   [x] Incorporating physical activity into lifestyle   [x] Exercise for Health, Reducing Risks for Heart Disease, Diabetes and Heart Health  [x] Preventing, through risk reduction behaviors, detecting, and treating chronic complications  [x] Sick Day management  [x] Developing personalized strategies to address psychosocial issues and concerns  [x] Developing personalized strategies to promote health and behavior change through goalsetting, behavior change strategies aimed at risk reduction  [x] Special situations--disaster planning, travel, social activities     Session Assessment & Evaluation Ratings:  1=Needs Instruction  2=Needs Review  3=Comprehends Key Points  4=Demonstrates Understanding/Competency  NC=Not Covered   N/A=Not Applicable Initial  Assess     Date:  5/1/19 2nd  Visit     Date:   5/6/19 3rd  Visit     Date:  5/15/19 4th  Visit     Date: Comments  S.O.C=Stage of Change/Readiness to change:  · Pre=Pre-contemplation stage--not thinking about changing  · C=Contemplation stage--ambivalent about changing  · P=Preparation stage--prepared to made a specific change  · A=Action stage--committed to modify behaviors  · M=Maintenance and relapse prevention--incorporating new behavior   Participant Stated  Goal      Healthy Eating-will try to eat 3 meals a day                                      Participant Stated Goal          S. O.C  [] PRE  [] C  [x] P      [] A  [] M                       S. O.C  [] PRE  [] C  [] P      [] A  [] M       S. O.C  [] PRE  [] C  [] P      [x] A  [] M                        S. O.C  [] PRE  [] C  [] P      [] A  [] M        S. O.C  [] PRE  [] C  [] P      [] A  [] M                       S. O.C  [] PRE  [] C  [] P      [] A  [] M       S. O.C  [] PRE  [] C  [] P      [] A  [] M                       S. O.C  [] PRE  [] C  [] P      [] A  [] M    Current main goal attainment frequency:   [] Never  [x] Some 5/1/19  [] Half  [x] Most 5/6/19  [] All     Participant confidence to master goal this visit:   [] Excellent  [x] Good  [] Fair  [] Poor                 Current main goal attainment frequency:   [] Never  [] Some  [] Half  [] Most  [] All     Participant confidence to master goal this visit:   [] Excellent  [] Good [] Fair  [] Poor                      Session  Topics & Learning Objectives:          Comments:   Diabetes disease process & Treatment process: Define diabetes & prediabetes; identify own type of diabetes; role of the pancreas; signs/symptoms; diagnostic criteria; prevention    Rating  [] 1  [] 2  [] 3  [] 4  [x] NC  [] N/A        _______  Rating  [] 1  [] 2  [] 3  [] 4  [x] NC  [] N/A    Rating  [] 1  [] 2  [] 3  [x] 4  [] NC  [] N/A        _______  Rating  [x] 1  [] 2  [] 3  [] 4  [] NC  [] N/A    Rating  [] 1  [] 2  [] 3  [] 4  [] NC  [] N/A        ________  Rating  [] 1  [] 2  [] 3  [] 4  [] NC  [] N/A    5/1/19                          ___________________________________________________________________________________   Monitoring blood glucose, interpreting and using results: Identify recommended blood glucose targets, personal targets, A1C target, importance of logging glucose,appropriate techniques and problem solving. Safe lancet disposal.    Rating  [x] 1  [] 2  [] 3  [] 4  [] NC  [] N/A       Rating  [] 1  [x] 2  [] 3  [] 4  [] NC  [] N/A          Rating  [] 1  [] 2  [] 3  [] 4  [x] NC  [] N/A       Rating  [] 1  [] 2  [] 3  [] 4  [] NC  [] N/A        5/1/19 5/6/19 Discussed using \"Monitoring\" Conversation Map. Is monitoring once daily in the morning. Prevention, detection & treatment of acute complications: List symptoms of hyper- and hypoglycemia; Describe how to treat low blood sugar & actions for lowering high blood glucose levels. Prevention and treatment strategies.   ____________________________  Describe sick day guidelines and indications for physician notification.                      Rating  [x] 1  [] 2  [] 3  [] 4  [] NC  [] N/A     _______  Rating  [x] 1  [] 2  [] 3  [] 4  [] NC  [] N/A Rating  [x] 1  [] 2  [] 3  [] 4  [] NC  [] N/A     _______  Rating  [] 1  [x] 2  [] 3  [] 4  [] NC  [] N/A Rating  [] 1  [x] 2  [] 3  [] 4  [] NC  [] N/A     _______  Rating  [] 1  [] 2  [] 3  [] 4  [x] NC  [] N/A Rating  [] 1  [] 2  [] 3  [] 4  [] NC  [] N/A     ________  Rating  [] 1  [] 2  [] 3  [] 4  [] NC  [] N/A     5/1/19 Discussed hyper/hypoglycemia and proper treatment.   5/6/19 Hyper/hypoglycemia handouts given.      5/15/19 Discussed long-term complications           _____________________________________________________________________________________     5/1/19 Briefly discussed. 5/6/19 Handout given   Prevention, detection & treatment of chronic complications: Define the natural course of diabetes & describe the relationship of blood glucose levels to long term complications of diabetes. Identify preventative measures and standard of care.       Rating  [x] 1  [] 2  [] 3  [] 4  [] NC  [] N/A       Rating  [] 1  [] 2  [] 3  [] 4  [x] NC  [] N/A       Rating  [x] 1  [] 2  [] 3  [] 4  [] NC  [] N/A       Rating  [] 1  [] 2  [] 3  [] 4  [] NC  [] N/A     5/1/19 Briefly discussed  5/15/19   Developing strategies to address psychosocial issues: Describe feelings about living with diabetes; Identify support needed & support network. Describe how stress, depression, and anxiety affect blood glucose. Identify coping strategies. Rating  [] 1  [] 2  [] 3  [] 4  [x] NC  [] N/A          Rating  [x] 1  [] 2  [] 3  [] 4  [] NC  [] N/A       Rating  [] 1  [] 2  [] 3  [] 4  [x] NC  [] N/A       Rating  [] 1  [] 2  [] 3  [] 4  [] NC  [] N/A        5/6/19 Discussed importance of support network, coping with diabetes and stress management. Developing strategies to promote health/change behavior:  Define the ABCs of diabetes; Identify appropriate screenings, schedule & personal plan for screenings. Identify 7 self-care behaviors. Benefits, challenges and strategies for behavioral change.       Rating  [x] 1  [] 2  [] 3  [] 4  [] N  [] N/A       Rating  [x] 1  [] 2  [] 3  [] 4  [] NC  [] N/A       Rating  [] 1  [x] 2  [] 3  [] 4  [] NC  [] N/A       Rating  [] 1  [] 2  [] 3  [] 4  [] NC  [] N/A        5/1/19 5/6/19 Know Your Numbers with Diabetes Care Checklist handout given.   5/15/19       Time Spent with patient: 2 Hours   Plan        Next Appointment: Class 3 on 5/15/19 at 1:00 PM.     Instruction Method: [x]Lecture/Discussion  []Power Point Presentation  [x]Handouts  []Return Demonstration        Education Materials/Equipment Provided:    [x]Self-Management - Initial assessment - ADA  Where do I Begin, Living with Type 2 diabetes\" book;  Diet meal planning basics, handout on diabetes education classes, hyper/hypoglycemia signs/symptoms and treatment handout; Diabetes zones; Support plan resource list.         [x]Self-Management  Class 1 - Living Well with Diabetes\" Booklet. Healthy Interactions Conversation \"On The Road To Better Managing Your Diabetes\" map.     [x] Self-Management  Class 2 -  \"Traveling with Diabetes\", [address], \"Coping with Diabetes\", \"Diabetes Disaster Planning\", \"Know Your Numbers/Diabetes Care Checklist\", High Blood Sugar, Low Blood Sugar. Healthy Interactions Map \"Monitoring Your Blood Glucose. \"     [x] Self-Management  Class 3 - \"Risk Factors for CVD\", foot care tips sheet and \"How to pick the right shoe\", \"Medications Used To Treat Diabetes\", How To Care For Your Teeth and Gums\"  \"Vaccinations For Adults with Diabetes\", \"Type 2 diabetes and the role of GLP-1\". []Self-Management - 3 month follow-up      []Glucose Meter      []Insulin Kit      []Other                   Handouts/Booklets given:      [x] Living Well with Diabetes    [x] Daily Diabetic Meal Planning Guide   [] Nutrition in the Fast [employer]    [x] Resources for People With Diabetes   [x] Other: Log book  5/6/19 High Blood Sugar; Low Blood Sugar; Know Your Numbers; Managing Sick Days; Traveling with Diabetes; Coping with Diabetes;  How Does High Blood Sugar Affect My Body; Diabetes Disaster Planning         Dottie Alvarez RN  [facility]  Diabetes clinic educator  5/15/19       5:35 PM

## 2019-07-10 ENCOUNTER — OFFICE VISIT (OUTPATIENT)
Dept: FAMILY MEDICINE CLINIC | Age: 59
End: 2019-07-10
Payer: COMMERCIAL

## 2019-07-10 VITALS
OXYGEN SATURATION: 98 % | HEIGHT: 69 IN | BODY MASS INDEX: 32.44 KG/M2 | WEIGHT: 219 LBS | SYSTOLIC BLOOD PRESSURE: 122 MMHG | HEART RATE: 71 BPM | DIASTOLIC BLOOD PRESSURE: 80 MMHG

## 2019-07-10 DIAGNOSIS — E11.9 TYPE 2 DIABETES MELLITUS WITHOUT COMPLICATION, WITHOUT LONG-TERM CURRENT USE OF INSULIN (HCC): Primary | ICD-10-CM

## 2019-07-10 LAB — HBA1C MFR BLD: 6.4 %

## 2019-07-10 PROCEDURE — 83036 HEMOGLOBIN GLYCOSYLATED A1C: CPT | Performed by: FAMILY MEDICINE

## 2019-07-10 PROCEDURE — 99213 OFFICE O/P EST LOW 20 MIN: CPT | Performed by: FAMILY MEDICINE

## 2019-07-10 RX ORDER — TAMSULOSIN HYDROCHLORIDE 0.4 MG/1
0.4 CAPSULE ORAL DAILY
Qty: 90 CAPSULE | Refills: 1 | Status: CANCELLED | OUTPATIENT
Start: 2019-07-10

## 2019-07-10 RX ORDER — FEXOFENADINE HCL AND PSEUDOEPHEDRINE HCI 60; 120 MG/1; MG/1
1 TABLET, EXTENDED RELEASE ORAL 2 TIMES DAILY
Qty: 180 TABLET | Refills: 1 | Status: CANCELLED | OUTPATIENT
Start: 2019-07-10

## 2019-07-10 ASSESSMENT — ENCOUNTER SYMPTOMS
DIARRHEA: 0
TROUBLE SWALLOWING: 0
EYE REDNESS: 0
CONSTIPATION: 0
VOMITING: 0
BLOOD IN STOOL: 0
FACIAL SWELLING: 0
NAUSEA: 0
COUGH: 0
EYE DISCHARGE: 0
SHORTNESS OF BREATH: 0
ABDOMINAL PAIN: 0

## 2019-07-10 NOTE — PROGRESS NOTES
Past Surgical History:     Past Surgical History:   Procedure Laterality Date    BACK SURGERY  2000    CERVICAL FUSION  2007    COLONOSCOPY      COLONOSCOPY  05-    Dr. Jerzy Rasmussen (Biopsies)    COLONOSCOPY  11/06/2017    DR Marc Askew    KNEE CARTILAGE SURGERY Bilateral     GA COLONOSCOPY W/BIOPSY SINGLE/MULTIPLE N/A 11/6/2017    COLONOSCOPY WITH BIOPSY/ polypectomies performed by Kaveh Werner MD at [facility] OR        Medications:       Prior to Admission medications    Medication Sig Start Date End Date Taking? Authorizing Provider   metFORMIN (GLUCOPHAGE) 500 MG tablet Take 1 tablet by mouth daily (with breakfast) 7/10/19  Yes Sohail Castillo MD   baclofen (LIORESAL) 10 MG tablet Take 1 tablet by mouth 2 times daily 4/9/19  Yes Sohail Castillo MD   fexofenadine-pseudoephedrine (ALLEGRA-D 12HR)  MG per extended release tablet Take 1 tablet by mouth 2 times daily 4/9/19  Yes Sohail Castillo MD   tamsulosin [facility]) 0.4 MG capsule Take 1 capsule by mouth daily 4/9/19  Yes Sohail Castillo MD   blood glucose test strips (EXACTECH TEST) strip Pt testing blood sugar one daily and as needed 4/9/19  Yes Sohail Castillo MD   Nutritional Supplements (JOINT FORMULA PO) Take by mouth   Yes Historical Provider, MD   CPAP Machine MISC by Does not apply route nightly   Yes Historical Provider, MD   traZODone (DESYREL) 50 MG tablet Take 1 tablet by mouth nightly  Patient taking differently: Take 50 mg by mouth nightly as needed  9/27/17   Sohail Castillo MD   Naproxen Sodium (ALEVE) 220 MG CAPS Take 1 tablet by mouth 2 times daily as needed for Pain. Historical Provider, MD        Allergies:       Darvocet a500 [propoxyphene n-acetaminophen]    Social History:     Tobacco:    reports that he quit smoking about 10 years ago. His smoking use included cigarettes. He has a 37.50 pack-year smoking history. He quit smokeless tobacco use about 3 years ago. His smokeless tobacco use included chew.   Alcohol:

## 2019-10-06 ENCOUNTER — PATIENT MESSAGE (OUTPATIENT)
Dept: FAMILY MEDICINE CLINIC | Age: 59
End: 2019-10-06

## 2019-10-07 RX ORDER — TAMSULOSIN HYDROCHLORIDE 0.4 MG/1
0.4 CAPSULE ORAL DAILY
Qty: 90 CAPSULE | Refills: 1 | Status: SHIPPED | OUTPATIENT
Start: 2019-10-07 | End: 2020-04-06 | Stop reason: SDUPTHER

## 2019-10-07 RX ORDER — FEXOFENADINE HCL AND PSEUDOEPHEDRINE HCI 60; 120 MG/1; MG/1
1 TABLET, EXTENDED RELEASE ORAL 2 TIMES DAILY
Qty: 180 TABLET | Refills: 1 | Status: SHIPPED | OUTPATIENT
Start: 2019-10-07 | End: 2020-04-06 | Stop reason: SDUPTHER

## 2019-10-17 ENCOUNTER — TELEPHONE (OUTPATIENT)
Dept: DIABETES SERVICES | Age: 59
End: 2019-10-17

## 2019-12-10 ENCOUNTER — PATIENT MESSAGE (OUTPATIENT)
Dept: FAMILY MEDICINE CLINIC | Age: 59
End: 2019-12-10

## 2019-12-11 RX ORDER — BACLOFEN 10 MG/1
10 TABLET ORAL 2 TIMES DAILY
Qty: 180 TABLET | Refills: 1 | Status: SHIPPED | OUTPATIENT
Start: 2019-12-11 | End: 2020-06-13 | Stop reason: SDUPTHER

## 2020-01-04 ENCOUNTER — PATIENT MESSAGE (OUTPATIENT)
Dept: FAMILY MEDICINE CLINIC | Age: 60
End: 2020-01-04

## 2020-02-10 ENCOUNTER — NURSE ONLY (OUTPATIENT)
Dept: PRIMARY CARE CLINIC | Age: 60
End: 2020-02-10
Payer: MEDICARE

## 2020-02-10 PROCEDURE — G0008 ADMIN INFLUENZA VIRUS VAC: HCPCS | Performed by: NURSE PRACTITIONER

## 2020-02-10 PROCEDURE — 90686 IIV4 VACC NO PRSV 0.5 ML IM: CPT | Performed by: NURSE PRACTITIONER

## 2020-04-02 ENCOUNTER — TELEPHONE (OUTPATIENT)
Dept: DIABETES SERVICES | Age: 60
End: 2020-04-02

## 2020-04-02 NOTE — PROGRESS NOTES
or next scheduled): 7-10-19. \"Trying to schedule appt today. \"  Recent health problems or change in diabetes medications: Has been out of metformin for 2 months. Been admitted to hospital or ED visit last 4 months? No  Do you know the amount of carbohydrates you eat per meal?   []Yes   [x]No   Frequency of self-foot exam:   [x]Daily  []Other   Eye exam scheduled? [x]Yes   []No  Originally scheduled for 3/23 but was canceled/ rescheduled by optometrist's office d/t COVID precautions. How many times a day do you check your blood sugar? []1   []2   []3   []4   []more Has not been checking as often since he ran out of metformin; has been checking approx every 2 weeks. Does not keep a log. Have you been exercising since class? [x]Yes    []No   If yes, what kind? Walking at least an hour every day   If yes, how many days/week? []1   []2   []3   []4   [x]more  How often do you miss taking your medications? []All the time (5)   []Most of the time (4)   []Half the time (3)   []Occasionally (2)  [x]Never (1) \"When I have my metformin. \"      Diabetes Self Management Support Plan: To assist and support your continued progress in managing your diabetes following    education    (  )  Gym or exercise program of your choice. (Suggestions:  [employer], [employer] training, any exercise facility and your local Physical Therapy or Cardiac Rehabilitation exercise Program )      (  )   Library      ( x )    ADA website:  BrowserReview.ca. org      (  )   Http: DotProtection.gl      (  )   Diabetes Forecast [place] you may get this information on the ADA web site.       (  )  Diabetes Interview - [place]   [phone]      (  )  Diabetes Self Management (bi-monthly magazine)  [phone]      (  )  Health Journeys [employer] Paths  (relaxation tapes for people with Diabetes)          [phone]    (  )  Diabetes Support Group :  Monthly every third Wednesday of the month at [address] [place] [address]

## 2020-04-06 ENCOUNTER — OFFICE VISIT (OUTPATIENT)
Dept: FAMILY MEDICINE CLINIC | Age: 60
End: 2020-04-06
Payer: MEDICARE

## 2020-04-06 ENCOUNTER — HOSPITAL ENCOUNTER (OUTPATIENT)
Age: 60
Discharge: HOME | End: 2020-04-06
Payer: MEDICARE

## 2020-04-06 VITALS — DIASTOLIC BLOOD PRESSURE: 80 MMHG | BODY MASS INDEX: 32.93 KG/M2 | SYSTOLIC BLOOD PRESSURE: 120 MMHG | WEIGHT: 223 LBS

## 2020-04-06 LAB
ALBUMIN SERPL-MCNC: 4.9 G/DL (ref 3.5–5.2)
ALBUMIN/GLOBULIN RATIO: ABNORMAL (ref 1–2.5)
ALP BLD-CCNC: 93 U/L (ref 40–129)
ALT SERPL-CCNC: 29 U/L (ref 5–41)
ANION GAP SERPL CALCULATED.3IONS-SCNC: 13 MMOL/L (ref 9–17)
AST SERPL-CCNC: 27 U/L
BILIRUB SERPL-MCNC: 0.59 MG/DL (ref 0.3–1.2)
BUN BLDV-MCNC: 18 MG/DL (ref 6–20)
BUN/CREAT BLD: 19 (ref 9–20)
CALCIUM SERPL-MCNC: 10.1 MG/DL (ref 8.6–10.4)
CHLORIDE BLD-SCNC: 102 MMOL/L (ref 98–107)
CHOLESTEROL/HDL RATIO: 4.6
CHOLESTEROL: 232 MG/DL
CO2: 25 MMOL/L (ref 20–31)
CREAT SERPL-MCNC: 0.94 MG/DL (ref 0.7–1.2)
CREATININE URINE: 213.8 MG/DL (ref 39–259)
GFR AFRICAN AMERICAN: >60 ML/MIN
GFR NON-AFRICAN AMERICAN: >60 ML/MIN
GFR SERPL CREATININE-BSD FRML MDRD: ABNORMAL ML/MIN/{1.73_M2}
GFR SERPL CREATININE-BSD FRML MDRD: ABNORMAL ML/MIN/{1.73_M2}
GLUCOSE BLD-MCNC: 157 MG/DL (ref 70–99)
HBA1C MFR BLD: 7.1 %
HDLC SERPL-MCNC: 50 MG/DL
LDL CHOLESTEROL: 149 MG/DL (ref 0–130)
MICROALBUMIN/CREAT 24H UR: <12 MG/L
MICROALBUMIN/CREAT UR-RTO: NORMAL MCG/MG CREAT
PATIENT FASTING?: YES
POTASSIUM SERPL-SCNC: 4.2 MMOL/L (ref 3.7–5.3)
SODIUM BLD-SCNC: 140 MMOL/L (ref 135–144)
TOTAL PROTEIN: 8.2 G/DL (ref 6.4–8.3)
TRIGL SERPL-MCNC: 166 MG/DL
VLDLC SERPL CALC-MCNC: ABNORMAL MG/DL (ref 1–30)

## 2020-04-06 PROCEDURE — 36415 COLL VENOUS BLD VENIPUNCTURE: CPT

## 2020-04-06 PROCEDURE — 3051F HG A1C>EQUAL 7.0%<8.0%: CPT | Performed by: FAMILY MEDICINE

## 2020-04-06 PROCEDURE — 80061 LIPID PANEL: CPT

## 2020-04-06 PROCEDURE — 82043 UR ALBUMIN QUANTITATIVE: CPT

## 2020-04-06 PROCEDURE — 82570 ASSAY OF URINE CREATININE: CPT

## 2020-04-06 PROCEDURE — 99214 OFFICE O/P EST MOD 30 MIN: CPT | Performed by: FAMILY MEDICINE

## 2020-04-06 PROCEDURE — 83036 HEMOGLOBIN GLYCOSYLATED A1C: CPT | Performed by: FAMILY MEDICINE

## 2020-04-06 PROCEDURE — 80053 COMPREHEN METABOLIC PANEL: CPT

## 2020-04-06 RX ORDER — TRAZODONE HYDROCHLORIDE 50 MG/1
50 TABLET ORAL NIGHTLY
Qty: 90 TABLET | Refills: 1 | Status: SHIPPED | OUTPATIENT
Start: 2020-04-06 | End: 2020-10-06 | Stop reason: SDUPTHER

## 2020-04-06 RX ORDER — TAMSULOSIN HYDROCHLORIDE 0.4 MG/1
0.4 CAPSULE ORAL DAILY
Qty: 90 CAPSULE | Refills: 1 | Status: SHIPPED | OUTPATIENT
Start: 2020-04-06 | End: 2020-10-06 | Stop reason: SDUPTHER

## 2020-04-06 RX ORDER — FEXOFENADINE HCL AND PSEUDOEPHEDRINE HCI 60; 120 MG/1; MG/1
1 TABLET, EXTENDED RELEASE ORAL 2 TIMES DAILY
Qty: 180 TABLET | Refills: 1 | Status: SHIPPED | OUTPATIENT
Start: 2020-04-06 | End: 2020-10-26 | Stop reason: SDUPTHER

## 2020-04-06 SDOH — ECONOMIC STABILITY: INCOME INSECURITY: HOW HARD IS IT FOR YOU TO PAY FOR THE VERY BASICS LIKE FOOD, HOUSING, MEDICAL CARE, AND HEATING?: NOT HARD AT ALL

## 2020-04-06 SDOH — ECONOMIC STABILITY: FOOD INSECURITY: WITHIN THE PAST 12 MONTHS, YOU WORRIED THAT YOUR FOOD WOULD RUN OUT BEFORE YOU GOT MONEY TO BUY MORE.: NEVER TRUE

## 2020-04-06 SDOH — ECONOMIC STABILITY: FOOD INSECURITY: WITHIN THE PAST 12 MONTHS, THE FOOD YOU BOUGHT JUST DIDN'T LAST AND YOU DIDN'T HAVE MONEY TO GET MORE.: NEVER TRUE

## 2020-04-06 ASSESSMENT — ENCOUNTER SYMPTOMS
VOMITING: 0
SHORTNESS OF BREATH: 0
EYE REDNESS: 0
NAUSEA: 0
BLURRED VISION: 0
ABDOMINAL PAIN: 0
CONSTIPATION: 0
EYE DISCHARGE: 0
DIARRHEA: 0
FACIAL SWELLING: 0
COUGH: 0
BLOOD IN STOOL: 0
TROUBLE SWALLOWING: 0

## 2020-04-06 ASSESSMENT — PATIENT HEALTH QUESTIONNAIRE - PHQ9
2. FEELING DOWN, DEPRESSED OR HOPELESS: 0
SUM OF ALL RESPONSES TO PHQ QUESTIONS 1-9: 0
SUM OF ALL RESPONSES TO PHQ9 QUESTIONS 1 & 2: 0
SUM OF ALL RESPONSES TO PHQ QUESTIONS 1-9: 0
1. LITTLE INTEREST OR PLEASURE IN DOING THINGS: 0

## 2020-04-06 NOTE — PROGRESS NOTES
Historical Provider, MD   Naproxen Sodium (ALEVE) 220 MG CAPS Take 1 tablet by mouth 2 times daily as needed for Pain. Historical Provider, MD        Allergies:       Darvocet a500 [propoxyphene n-acetaminophen]    Social History:     Tobacco:    reports that he quit smoking about 11 years ago. His smoking use included cigarettes. He has a 37.50 pack-year smoking history. He quit smokeless tobacco use about 4 years ago. His smokeless tobacco use included chew. Alcohol:      reports no history of alcohol use. Drug Use:  reports no history of drug use. Family History:     Family History   Problem Relation Age of Onset    Other Mother         dementia    Heart Disease Father     Heart Disease Brother        Review of Systems:       Review of Systems   Constitutional: Negative for chills, fatigue, fever and weight loss. HENT: Negative for facial swelling and trouble swallowing. Eyes: Negative for blurred vision, discharge, redness and visual disturbance. Respiratory: Negative for cough and shortness of breath. Cardiovascular: Negative for chest pain, palpitations and leg swelling. Gastrointestinal: Negative for abdominal pain, blood in stool, constipation, diarrhea, nausea and vomiting. Genitourinary: Negative for dysuria and hematuria. Musculoskeletal: Negative for joint swelling and neck stiffness. Skin: Negative for pallor and rash. Neurological: Negative for dizziness, light-headedness and headaches. Psychiatric/Behavioral: Negative for confusion and sleep disturbance. Physical Exam:     Physical Exam  Vitals signs reviewed. Constitutional:       General: He is not in acute distress. Appearance: Normal appearance. He is well-developed. He is not ill-appearing. HENT:      Head: Normocephalic and atraumatic. Eyes:      General: No scleral icterus. Right eye: No discharge. Left eye: No discharge.       Extraocular Movements: Extraocular movements Moderately severe depression, 20-27 = Severe depression      Return in about 6 months (around 10/6/2020) for DM, insomnia, arthritis.       Electronically signed by Sarmad Rodriguez MD on 4/6/2020 at 9:03 AM

## 2020-04-07 ENCOUNTER — TELEPHONE (OUTPATIENT)
Dept: FAMILY MEDICINE CLINIC | Age: 60
End: 2020-04-07

## 2020-04-07 RX ORDER — ATORVASTATIN CALCIUM 20 MG/1
20 TABLET, FILM COATED ORAL DAILY
Qty: 30 TABLET | Refills: 5 | Status: SHIPPED | OUTPATIENT
Start: 2020-04-07 | End: 2020-10-06

## 2020-06-12 ENCOUNTER — PATIENT MESSAGE (OUTPATIENT)
Dept: FAMILY MEDICINE CLINIC | Age: 60
End: 2020-06-12

## 2020-06-13 RX ORDER — BACLOFEN 10 MG/1
10 TABLET ORAL 2 TIMES DAILY
Qty: 180 TABLET | Refills: 1 | Status: SHIPPED | OUTPATIENT
Start: 2020-06-13 | End: 2020-10-06 | Stop reason: SDUPTHER

## 2020-10-06 ENCOUNTER — OFFICE VISIT (OUTPATIENT)
Dept: FAMILY MEDICINE CLINIC | Age: 60
End: 2020-10-06
Payer: MEDICARE

## 2020-10-06 ENCOUNTER — HOSPITAL ENCOUNTER (OUTPATIENT)
Age: 60
Discharge: HOME | End: 2020-10-06
Payer: MEDICARE

## 2020-10-06 VITALS
HEART RATE: 94 BPM | WEIGHT: 229 LBS | BODY MASS INDEX: 33.92 KG/M2 | HEIGHT: 69 IN | SYSTOLIC BLOOD PRESSURE: 136 MMHG | DIASTOLIC BLOOD PRESSURE: 80 MMHG | OXYGEN SATURATION: 98 %

## 2020-10-06 LAB
HBA1C MFR BLD: 7.2 %
TESTOSTERONE TOTAL: 168 NG/DL (ref 220–1000)
TSH SERPL DL<=0.05 MIU/L-ACNC: 2.18 MIU/L (ref 0.3–5)

## 2020-10-06 PROCEDURE — 83036 HEMOGLOBIN GLYCOSYLATED A1C: CPT | Performed by: FAMILY MEDICINE

## 2020-10-06 PROCEDURE — G0008 ADMIN INFLUENZA VIRUS VAC: HCPCS | Performed by: FAMILY MEDICINE

## 2020-10-06 PROCEDURE — 84403 ASSAY OF TOTAL TESTOSTERONE: CPT

## 2020-10-06 PROCEDURE — 84443 ASSAY THYROID STIM HORMONE: CPT

## 2020-10-06 PROCEDURE — 36415 COLL VENOUS BLD VENIPUNCTURE: CPT

## 2020-10-06 PROCEDURE — G0009 ADMIN PNEUMOCOCCAL VACCINE: HCPCS | Performed by: FAMILY MEDICINE

## 2020-10-06 PROCEDURE — 3051F HG A1C>EQUAL 7.0%<8.0%: CPT | Performed by: FAMILY MEDICINE

## 2020-10-06 PROCEDURE — 99215 OFFICE O/P EST HI 40 MIN: CPT | Performed by: FAMILY MEDICINE

## 2020-10-06 PROCEDURE — 90686 IIV4 VACC NO PRSV 0.5 ML IM: CPT | Performed by: FAMILY MEDICINE

## 2020-10-06 PROCEDURE — 90732 PPSV23 VACC 2 YRS+ SUBQ/IM: CPT | Performed by: FAMILY MEDICINE

## 2020-10-06 RX ORDER — TRAZODONE HYDROCHLORIDE 50 MG/1
50 TABLET ORAL NIGHTLY
Qty: 90 TABLET | Refills: 1 | Status: SHIPPED | OUTPATIENT
Start: 2020-10-06 | End: 2021-04-06 | Stop reason: SDUPTHER

## 2020-10-06 RX ORDER — BACLOFEN 10 MG/1
10 TABLET ORAL 2 TIMES DAILY
Qty: 180 TABLET | Refills: 1 | Status: SHIPPED | OUTPATIENT
Start: 2020-10-06 | End: 2021-04-06 | Stop reason: SDUPTHER

## 2020-10-06 RX ORDER — TAMSULOSIN HYDROCHLORIDE 0.4 MG/1
0.4 CAPSULE ORAL DAILY
Qty: 90 CAPSULE | Refills: 1 | Status: SHIPPED | OUTPATIENT
Start: 2020-10-06 | End: 2021-04-06 | Stop reason: SDUPTHER

## 2020-10-06 ASSESSMENT — ENCOUNTER SYMPTOMS
SHORTNESS OF BREATH: 0
DIARRHEA: 0
VOMITING: 0
CONSTIPATION: 0
COUGH: 0
BLOOD IN STOOL: 0
ABDOMINAL PAIN: 0
EYE REDNESS: 0
TROUBLE SWALLOWING: 0
EYE DISCHARGE: 0
NAUSEA: 0
FACIAL SWELLING: 0

## 2020-10-06 NOTE — PROGRESS NOTES
HPI Notes    Name: Sosa Mccracken  : 1960        Chief Complaint:     Chief Complaint   Patient presents with    Diabetes    Insomnia     stable, Pt takes Trazodone prn    Allergies     chronic but stable on Allergra     Sleep Apnea     Pt not wearing c pap       History of Present Illness: Sosa Mccracken is a 64 y.o.  male who presents with Diabetes; Insomnia (stable, Pt takes Trazodone prn); Allergies (chronic but stable on Allergra ); and Sleep Apnea (Pt not wearing c pap)      Diabetes   He presents for his follow-up diabetic visit. He has type 2 diabetes mellitus. His disease course has been stable. There are no hypoglycemic associated symptoms. Pertinent negatives for hypoglycemia include no confusion, dizziness, headaches, pallor or tremors. Associated symptoms include fatigue. Pertinent negatives for diabetes include no chest pain. There are no hypoglycemic complications. There are no diabetic complications. Risk factors for coronary artery disease include diabetes mellitus, male sex and dyslipidemia. Current diabetic treatment includes oral agent (monotherapy). His weight is increasing steadily. Diabetic current diet: pt is trying to \"be good\" with his diet but pt likes bread and sweets. He participates in exercise weekly. There is no change (hgba1c from 7.1 to 7.2.) in his home blood glucose trend. Eye exam is current. Hyperlipidemia   This is a chronic problem. The current episode started more than 1 year ago. Recent lipid tests were reviewed and are high. Exacerbating diseases include diabetes. Pertinent negatives include no chest pain or shortness of breath. Treatments tried: pt tried the lipitor for several weeks -- took a whole bottle BUT the Rt shoulder and foot were aching and burning. Pt could hardly move Rt arm too. So pt thought maybe the lipitor. Pt stopped the lipitor and ALL symptoms were gone.    Compliance problems: Pt doesn't want to try another statin which eas Medications:       Prior to Admission medications    Medication Sig Start Date End Date Taking? Authorizing Provider   traZODone (DESYREL) 50 MG tablet Take 1 tablet by mouth nightly 10/6/20  Yes Garcia Chandra MD   tamsulosin [facility]) 0.4 MG capsule Take 1 capsule by mouth daily 10/6/20  Yes Garcia Chandra MD   metFORMIN (GLUCOPHAGE) 500 MG tablet Take 1 tablet by mouth daily (with breakfast) 10/6/20  Yes Garcia Chandra MD   baclofen (LIORESAL) 10 MG tablet Take 1 tablet by mouth 2 times daily 10/6/20  Yes Garcia Chandra MD   fexofenadine-pseudoephedrine (ALLEGRA-D 12HR)  MG per extended release tablet Take 1 tablet by mouth 2 times daily 4/6/20  Yes Garcia Chandra MD   Nutritional Supplements (JOINT FORMULA PO) Take by mouth   Yes Historical Provider, MD   Naproxen Sodium (ALEVE) 220 MG CAPS Take 1 tablet by mouth 2 times daily as needed for Pain. Yes Historical Provider, MD   blood glucose test strips (EXACTECH TEST) strip Pt testing blood sugar one daily and as needed 4/9/19   Garcia Chandra MD   CPAP Machine MISC by Does not apply route nightly    Historical Provider, MD        Allergies:       Darvocet a500 [propoxyphene n-acetaminophen]    Social History:     Tobacco:    reports that he quit smoking about 12 years ago. His smoking use included cigarettes. He has a 37.50 pack-year smoking history. He quit smokeless tobacco use about 4 years ago. His smokeless tobacco use included chew. Alcohol:      reports no history of alcohol use. Drug Use:  reports no history of drug use. Family History:     Family History   Problem Relation Age of Onset    Other Mother         dementia    Heart Disease Father     Heart Disease Brother        Review of Systems:       Review of Systems   Constitutional: Positive for fatigue. Negative for chills and fever. HENT: Negative for facial swelling and trouble swallowing. Eyes: Negative for discharge, redness and visual disturbance. Respiratory: Negative for cough and shortness of breath. Cardiovascular: Negative for chest pain, palpitations and leg swelling. Gastrointestinal: Negative for abdominal pain, blood in stool, constipation, diarrhea, nausea and vomiting. Genitourinary: Negative for dysuria and hematuria. Musculoskeletal: Negative for joint swelling, neck pain and neck stiffness. Skin: Negative for pallor and rash. Neurological: Negative for dizziness, tremors, light-headedness and headaches. Psychiatric/Behavioral: Positive for sleep disturbance. Negative for confusion. Physical Exam:     Physical Exam  Vitals signs reviewed. Constitutional:       General: He is not in acute distress. Appearance: Normal appearance. He is well-developed. He is not ill-appearing. HENT:      Head: Normocephalic and atraumatic. Eyes:      General:         Right eye: No discharge. Left eye: No discharge. Conjunctiva/sclera: Conjunctivae normal.      Pupils: Pupils are equal, round, and reactive to light. Neck:      Musculoskeletal: Neck supple. Thyroid: No thyromegaly. Vascular: No carotid bruit. Cardiovascular:      Rate and Rhythm: Normal rate and regular rhythm. Heart sounds: Normal heart sounds. No murmur. Pulmonary:      Effort: Pulmonary effort is normal. No respiratory distress. Breath sounds: Normal breath sounds. No wheezing. Abdominal:      General: Bowel sounds are normal. There is no distension. Palpations: Abdomen is soft. Tenderness: There is no abdominal tenderness. Musculoskeletal:      Right lower leg: No edema. Left lower leg: No edema. Comments: microfilament sensation intact and no lesions or sores on feet bilateral. +2 DP pulse bilateral.     Lymphadenopathy:      Cervical: No cervical adenopathy. Skin:     Coloration: Skin is not pale. Findings: No rash. Neurological:      General: No focal deficit present.       Mental Status: He is alert and oriented to person, place, and time. Psychiatric:         Mood and Affect: Mood normal.         Behavior: Behavior normal.         Vitals:  /80   Pulse 94   Ht 5' 9\" (1.753 m)   Wt 229 lb (103.9 kg)   SpO2 98%   BMI 33.82 kg/m²       Data:     Lab Results   Component Value Date     04/06/2020    K 4.2 04/06/2020     04/06/2020    CO2 25 04/06/2020    BUN 18 04/06/2020    CREATININE 0.94 04/06/2020    GLUCOSE 157 04/06/2020    PROT 8.2 04/06/2020    LABALBU 4.9 04/06/2020    BILITOT 0.59 04/06/2020    ALKPHOS 93 04/06/2020    AST 27 04/06/2020    ALT 29 04/06/2020     Lab Results   Component Value Date    WBC 7.1 05/23/2014    RBC 5.89 05/23/2014    HGB 17.1 05/23/2014    HCT 50.4 05/23/2014    MCV 85.6 05/23/2014    MCH 29.1 05/23/2014    MCHC 34.0 05/23/2014    RDW 12.8 05/23/2014     05/23/2014    MPV NOT REPORTED 05/23/2014     Lab Results   Component Value Date    TSH 2.18 10/06/2020     Lab Results   Component Value Date    CHOL 232 04/06/2020    CHOL 188 11/01/2018    HDL 50 04/06/2020    PSA 0.50 11/01/2018    LABA1C 7.2 10/06/2020          Assessment/Plan:        1. Type 2 diabetes mellitus without complication, without long-term current use of insulin ([facility])  F/U 6mos, prior CMP, Lipids, microurine, HgbA1C. Do daily foot checks and yearly eye exams  Improve diet control   -  DIABETES FOOT EXAM  - Lipid Panel; Future  - Microalbumin, Ur; Future  - Comprehensive Metabolic Panel; Future  - Hemoglobin A1C; Future    2. Pure hypercholesterolemia  Pt unable to take statin -- lipitor and would not like to try another statin now. He will work on diet control   - Lipid Panel; Future    3. Other insomnia  Pt takes trazodone as needed    4. Seasonal allergic rhinitis due to other allergic trigger  Stable on Allegra    5. Obstructive sleep apnea syndrome  Stable but pt refuses CPAP    6.  Other fatigue  D/w pt checking TSH and testosterone   - TSH without Reflex; Future  - Testosterone; Future    7. Colon cancer screening  Referral to Dr Robin Parks for a colonoscopy  - Wilder Ji MD, General Surgery, [facility]    8. Need for influenza vaccination  Shot given   - INFLUENZA, QUADV, 3 YRS AND OLDER, IM PF, PREFILL SYR OR SDV, 0.5ML (AFLURIA QUADV, PF)    9. Personal history of tobacco use  Test ordered for CT scan   - CT lung screen [Initial/Annual]; Future    10. Need for 23-polyvalent pneumococcal polysaccharide vaccine  Shot given  - Pneumococcal polysaccharide vaccine 23-valent greater than or equal to 3yo subcutaneous/IM        Yovanny received counseling on the following healthy behaviors: nutrition and exercise  Reviewed prior labs and health maintenance  Continue current medications, diet and exercise. Discussed use, benefit, and side effects of prescribed medications. Barriers to medication compliance addressed. Patient given educational materials - see patient instructions  Was a self-tracking handout given in paper form or via [employer]? Yes    Requested Prescriptions     Signed Prescriptions Disp Refills    traZODone (DESYREL) 50 MG tablet 90 tablet 1     Sig: Take 1 tablet by mouth nightly    tamsulosin (FLOMAX) 0.4 MG capsule 90 capsule 1     Sig: Take 1 capsule by mouth daily    metFORMIN (GLUCOPHAGE) 500 MG tablet 90 tablet 1     Sig: Take 1 tablet by mouth daily (with breakfast)    baclofen (LIORESAL) 10 MG tablet 180 tablet 1     Sig: Take 1 tablet by mouth 2 times daily       All patient questions answered. Patient voiced understanding. Quality Measures    Body mass index is 33.82 kg/m². Elevated. Weight control planned discussed Healthy diet and regular exercise. BP: 136/80 Blood pressure is normal. Treatment plan consists of No treatment change needed.     Lab Results   Component Value Date    LDLCALC 117 11/01/2018    LDLCHOLESTEROL 149 (H) 04/06/2020    (goal LDL reduction with dx if diabetes is 50% LDL reduction)      PHQ Scores 4/6/2020 5/1/2019 4/9/2019 9/5/2018 1/23/2017   PHQ2 Score 0 1 0 2 2   PHQ9 Score 0 4 0 2 2     Interpretation of Total Score Depression Severity: 1-4 = Minimal depression, 5-9 = Mild depression, 10-14 = Moderate depression, 15-19 = Moderately severe depression, 20-27 = Severe depression      Return in about 6 months (around 4/6/2021) for DM, Hyperlipidemia, insomnia, sleep apnea.       Electronically signed by Maude Hook MD on 10/6/2020 at 2:44 PM

## 2020-10-06 NOTE — PATIENT INSTRUCTIONS
SURVEY:    You may be receiving a survey from [employer] regarding your visit today. Please complete the survey to enable us to provide the highest quality of care to you and your family. If you cannot score us a very good (5 stars) on any question, please call the office to discuss how we could have made your experience a very good one. Thank you. Clinical Care Team:  MD Hoang Larry LPN    Clerical Team:  [facility]       Annie Newton    What is lung cancer screening? Lung cancer screening is a way in which doctors check the lungs for early signs of cancer in people who have no symptoms of lung cancer. A low-dose CT scan uses much less radiation than a normal CT scan and shows a more detailed image of the lungs than a standard X-ray. The goal of lung cancer screening is to find cancer early, before it has a chance to grow, spread, or cause problems. One large study found that smokers who were screened with low-dose CT scans were less likely to die of lung cancer than those who were screened with standard X-ray. Below is a summary of the things you need to know regarding screening for lung cancer with low-dose computed tomography (LDCT). This is a screening program that involves routine annual screening with LDCT studies of the lung. The LDCTs are done using low-dose radiation that is not thought to increase your cancer risk. If you have other serious medical conditions (other cancers, congestive heart failure) that limit your life expectancy to less than 10 years, you should not undergo lung cancer screening with LDCT. The chance is 20%-60% that the LDCT result will show abnormalities. This would require additional testing which could include repeat imaging or even invasive procedures. Most (about 95%) of \"abnormal\" LDCT results are false in the sense that no lung cancer is ultimately found.   Additionally, some

## 2020-10-06 NOTE — PROGRESS NOTES
Vaccine Information Sheet, \"Influenza - Inactivated\"  given to Ling Diaz, or parent/legal guardian of  Ling Diaz and verbalized understanding. Patient responses:    Have you ever had a reaction to a flu vaccine? No  Are you able to eat eggs without adverse effects? Yes  Do you have any current illness? No  Have you ever had Guillian Hyannis Syndrome? No    Flu vaccine given per order. Please see immunization tab. Low Dose CT (LDCT) Lung Screening criteria met   Age 50-69   Pack year smoking >30   Still smoking or less than 15 year since quit   No sign or symptoms of lung cancer   > 11 months since last LDCT     Risks and benefits of lung cancer screening with LDCT scans discussed:    Significance of positive screen - False-positive LDCT results often occur. 95% of all positive results do not lead to a diagnosis of cancer. Usually further imaging can resolve most false-positive results; however, some patients may require invasive procedures. Over diagnosis risk - 10% to 12% of screen-detected lung cancer cases are over diagnosed--that is, the cancer would not have been detected in the patient's lifetime without the screening. Need for follow up screens annually to continue lung cancer screening effectiveness     Risks associated with radiation from annual LDCT- Radiation exposure is about the same as for a mammogram, which is about 1/3 of the annual background radiation exposure from everyday life. Starting screening at age 54 is not likely to increase cancer risk from radiation exposure. Patients with comorbidities resulting in life expectancy of < 10 years, or that would preclude treatment of an abnormality identified on CT, should not be screened due to lack of benefit.     To obtain maximal benefit from this screening, smoking cessation and long-term abstinence from smoking is critical

## 2020-10-07 ENCOUNTER — TELEPHONE (OUTPATIENT)
Dept: FAMILY MEDICINE CLINIC | Age: 60
End: 2020-10-07

## 2020-10-07 NOTE — TELEPHONE ENCOUNTER
----- Message from Jade Darden MD sent at 10/6/2020  5:17 PM EDT -----  Please tell pt his thyroid was normal but testosterone level low.  So pt needs referred to Endocrine [facility]

## 2020-10-12 ENCOUNTER — INITIAL CONSULT (OUTPATIENT)
Dept: SURGERY | Age: 60
End: 2020-10-12

## 2020-10-12 PROCEDURE — 99999 PR OFFICE/OUTPT VISIT,PROCEDURE ONLY: CPT | Performed by: SURGERY

## 2020-10-15 VITALS
HEIGHT: 69 IN | WEIGHT: 229 LBS | HEART RATE: 90 BPM | TEMPERATURE: 98.2 F | DIASTOLIC BLOOD PRESSURE: 82 MMHG | SYSTOLIC BLOOD PRESSURE: 138 MMHG | RESPIRATION RATE: 20 BRPM | BODY MASS INDEX: 33.92 KG/M2

## 2020-10-22 ENCOUNTER — TELEPHONE (OUTPATIENT)
Dept: ONCOLOGY | Age: 60
End: 2020-10-22

## 2020-10-22 NOTE — LETTER
10/22/2020        [address] [place] [id number]    Dear Antonia Chatterjee:    Your healthcare provider has ordered a low dose CT scan of the chest for lung cancer screening. You will find enclosed, information about CT lung screening. Please review the statement of understanding, you will be asked to sign a copy of this at the time of your CT scan    If you have not already been contacted to make the appointment for your scan, please call our scheduling department at [phone]    Keep in mind that CT lung screening does not take the place of smoking cessation. If you are a current smoker, you will find enclosed smoking cessation resources. Please do not hesitate to contact me if you have any questions or concerns.     [address] [place] [address] Lung Screening Program  [phone]

## 2020-10-26 ENCOUNTER — PATIENT MESSAGE (OUTPATIENT)
Dept: FAMILY MEDICINE CLINIC | Age: 60
End: 2020-10-26

## 2020-10-26 RX ORDER — FEXOFENADINE HCL AND PSEUDOEPHEDRINE HCI 60; 120 MG/1; MG/1
1 TABLET, EXTENDED RELEASE ORAL 2 TIMES DAILY
Qty: 180 TABLET | Refills: 1 | Status: SHIPPED | OUTPATIENT
Start: 2020-10-26 | End: 2020-12-30

## 2020-11-02 ENCOUNTER — HOSPITAL ENCOUNTER (OUTPATIENT)
Dept: CT IMAGING | Age: 60
Discharge: HOME | End: 2020-11-04
Payer: MEDICARE

## 2020-11-02 ENCOUNTER — TELEPHONE (OUTPATIENT)
Dept: FAMILY MEDICINE CLINIC | Age: 60
End: 2020-11-02

## 2020-11-02 PROCEDURE — G0297 LDCT FOR LUNG CA SCREEN: HCPCS

## 2020-11-02 RX ORDER — SODIUM, POTASSIUM,MAG SULFATES 17.5-3.13G
1 SOLUTION, RECONSTITUTED, ORAL ORAL ONCE
Qty: 1 KIT | Refills: 0 | Status: SHIPPED | OUTPATIENT
Start: 2020-11-02 | End: 2020-11-02

## 2020-11-02 RX ORDER — ATORVASTATIN CALCIUM 20 MG/1
20 TABLET, FILM COATED ORAL DAILY
Qty: 30 TABLET | Refills: 3 | Status: SHIPPED | OUTPATIENT
Start: 2020-11-02 | End: 2020-11-02 | Stop reason: ALTCHOICE

## 2020-11-02 RX ORDER — ROSUVASTATIN CALCIUM 5 MG/1
5 TABLET, COATED ORAL DAILY
Qty: 30 TABLET | Refills: 3 | Status: SHIPPED | OUTPATIENT
Start: 2020-11-02 | End: 2021-08-26 | Stop reason: ALTCHOICE

## 2020-11-02 ASSESSMENT — ENCOUNTER SYMPTOMS
ABDOMINAL PAIN: 0
SORE THROAT: 0
TROUBLE SWALLOWING: 0
BACK PAIN: 1
COUGH: 0
NAUSEA: 0
CHOKING: 0
SHORTNESS OF BREATH: 0
APNEA: 1
BLOOD IN STOOL: 0
VOMITING: 0

## 2020-11-02 NOTE — PROGRESS NOTES
Edwin Kelly MD  General Surgery, Endoscopy  Chief Medical Officer    [address] [facility]  [address] [address]  Dept: [phone]  Fax: [phone]  Chief Complaint   Patient presents with    Colonoscopy     Patient referred by Dr Ernestina Cervantes for colonoscopy. History of colon polyps. Most recent colonoscopy 11/2017. Patient denies any symptoms. HPI    Mr Tosin Darnell is a 63 yo WM kindly referred to me by Dr Ernestina Cervantes for a screening colonoscopy. He had small sessile transverse and sigmoid hyperplastic polypectomies in 2017, as well as colon polyps removed in 2011 and 2014, as he recalls. Next recommended screening interval 4-5 years (Nov 2021 to Nov 2022). No complaints. No abdominal pain. No recent weight changes. Normal appetite. Daily BM's, formed and brown, without blood. No family history of GI malignancy. 5 brothers, 4 sisters, 2 sons. He does not smoke. Review of Systems   Constitutional: Negative for activity change, appetite change, chills, fever and unexpected weight change. HENT: Negative for nosebleeds, sneezing, sore throat and trouble swallowing. Eyes: Negative for visual disturbance. Respiratory: Positive for apnea (sleep apnea). Negative for cough, choking and shortness of breath. Cardiovascular: Negative for chest pain, palpitations and leg swelling. Gastrointestinal: Negative for abdominal pain, blood in stool, nausea and vomiting. Genitourinary: Negative for dysuria, flank pain and hematuria. Musculoskeletal: Positive for arthralgias and back pain. Negative for gait problem and myalgias. Allergic/Immunologic: Negative for immunocompromised state. Neurological: Negative for dizziness, seizures, syncope, weakness and headaches. Hematological: Does not bruise/bleed easily. Psychiatric/Behavioral: Negative for confusion and sleep disturbance.        Past Medical History:   Diagnosis Date    Back pain chronic back pain    Insomnia     Osteoarthritis     Pneumonia     walking pneumonia    Sleep apnea     Type 2 diabetes mellitus without complication [facility])        Past Surgical History:   Procedure Laterality Date   [address] CERVICAL FUSION  2007    COLONOSCOPY      COLONOSCOPY  05-    Dr. Radha Silverman (Biopsies)    COLONOSCOPY  11/06/2017    DR Oneil Pena    KNEE CARTILAGE SURGERY Bilateral     [place] COLONOSCOPY W/BIOPSY SINGLE/MULTIPLE N/A 11/6/2017    COLONOSCOPY WITH BIOPSY/ polypectomies performed by Martha Dailey MD at [facility] OR       Family History   Problem Relation Age of Onset    Other Mother         dementia    Heart Disease Father     Heart Disease Brother        Allergies:  See list    Current Outpatient Medications   Medication Sig Dispense Refill    Na Sulfate-K Sulfate-Mg Sulf (SUPREP BOWEL PREP KIT) 17.5-3.13-1.6 GM/177ML SOLN Take 1 kit by mouth once for 1 dose 1 kit 0    traZODone (DESYREL) 50 MG tablet Take 1 tablet by mouth nightly 90 tablet 1    tamsulosin (FLOMAX) 0.4 MG capsule Take 1 capsule by mouth daily 90 capsule 1    metFORMIN (GLUCOPHAGE) 500 MG tablet Take 1 tablet by mouth daily (with breakfast) 90 tablet 1    baclofen (LIORESAL) 10 MG tablet Take 1 tablet by mouth 2 times daily 180 tablet 1    blood glucose test strips (EXACTECH TEST) strip Pt testing blood sugar one daily and as needed 100 each 3    Nutritional Supplements (JOINT FORMULA PO) Take by mouth      CPAP Machine MISC by Does not apply route nightly      Naproxen Sodium (ALEVE) 220 MG CAPS Take 1 tablet by mouth 2 times daily as needed for Pain.  fexofenadine-pseudoephedrine (ALLEGRA-D 12HR)  MG per extended release tablet Take 1 tablet by mouth 2 times daily 180 tablet 1     No current facility-administered medications for this visit.         Social History     Socioeconomic History    Marital status:      Spouse name: None    Number of children: None    Years of respiratory distress. Chest:      Chest wall: No tenderness. Abdominal:      General: There is no distension. Palpations: Abdomen is soft. There is no mass. Tenderness: There is no abdominal tenderness. There is no guarding or rebound. Musculoskeletal: Normal range of motion. Lymphadenopathy:      Cervical: No cervical adenopathy. Skin:     General: Skin is warm and dry. Findings: No erythema or rash. Neurological:      Mental Status: He is alert and oriented to person, place, and time. Cranial Nerves: No cranial nerve deficit. Psychiatric:         Behavior: Behavior normal.         Thought Content: Thought content normal.         Judgment: Judgment normal.         IMAGING/LABS    11/8/2017  1:48 PM - Joel, M[facility]pn Incoming Lab Results From [facility] [place]     Component  Collected  Lab    Surgical Pathology Report  11/06/2017  3:17 PM  [address]     (NOTE)   [id number]   [address] [place], [address]   [phone]   Fax: [phone]     [facility]     Patient Name: Derrick Law: Dacia   Path Number: [id number]   Collected: 11/6/2017   Received: 11/6/2017   Reported: 11/8/2017 13:48     -- Diagnosis --   1.  TRANSVERSE COLON, BIOPSIES:       -  NORMAL COLONIC MUCOSA. 2.  SIGMOID COLON, BIOPSIES:       -  SUGGESTIVE OF EARLY HYPERPLASTIC POLYP. Steve Sandoval M.D.   **Electronically Signed Out**         jet/11/7/2017       Clinical Information   Pre-op Diagnosis:  HISTORY OF COLON POLYPS, RECTAL BLEEDING   Operative Findings:  POLYP TRANSVERSE; POLYP SIGMOID   Operation Performed:  COLONOSCOPY WITH BIOPSY, POLYPECTOMIES     Source of Specimen   1: POLYP-TRANSVERSE (A)   2: POLYP-SIGMOID (B)     Gross Description   1.  \"HONG CASE, POLYP TRANSVERSE\" Three tan-white tissue fragments   from 0.4 to 0.5 cm and are 1.3 x 0.3 x 0.1 cm in aggregate.  Entirely   1cs. 2.  \"HONG CASE, POLYP SIGMOID\" Two tan-brown tissue fragments,   each 0.2 cm and are 0.4 x 0.2 x 0.1 cm in aggregate.  Entirely 1cs. rh tm       Microscopic Description   1. Sections of squamous mucosa show normal maturation.  There is no   evidence of ulceration, intraepithelial neutrophils or eosinophils,   intestinal metaplasia or dysplasia. 2. Sections of colonic mucosa show a suggestion of early undulating   surface glands and sawtooth shaped gland profiles. Juanetta Cornet is no   evidence of dysplasia or malignancy. Testing Performed By     Lab - Abbreviation  Name  Director  Address  Valid Date Range    [address], MD  [address] [address]  08/30/17 0801-Present    Lab and Collection     Surgical Pathology - 11/6/2017   Result History     Surgical Pathology on 11/8/2017          ASSESSMENT     Diagnosis Orders   1. History of colon polyps     2. BMI 33.0-33.9,adult     3. Sleep apnea, unspecified type     4. Pre-op testing  EKG 12 Lead       PLAN    Will proceed with colonoscopy in Nov 2011 to Nov 2012. He will return at that time to schedule.        Cathleen Blum MD

## 2020-11-02 NOTE — TELEPHONE ENCOUNTER
Patient informed of test results with Dr Billy Lundborg recommendations. Patient states has had reaction to lipitor in past, body aches. Per Dr Dejan Baltazar will try crestor 5 mg.

## 2020-12-02 ENCOUNTER — PATIENT MESSAGE (OUTPATIENT)
Dept: FAMILY MEDICINE CLINIC | Age: 60
End: 2020-12-02

## 2020-12-14 ENCOUNTER — HOSPITAL ENCOUNTER (OUTPATIENT)
Age: 60
Discharge: HOME | End: 2020-12-14
Payer: MEDICARE

## 2020-12-14 LAB
ABSOLUTE EOS #: 0.1 K/UL (ref 0–0.4)
ABSOLUTE IMMATURE GRANULOCYTE: NORMAL K/UL (ref 0–0.3)
ABSOLUTE LYMPH #: 1.8 K/UL (ref 1–4.8)
ABSOLUTE MONO #: 0.6 K/UL (ref 0–1)
BASOPHILS # BLD: 1 % (ref 0–2)
BASOPHILS ABSOLUTE: 0 K/UL (ref 0–0.2)
DIFFERENTIAL TYPE: YES
EOSINOPHILS RELATIVE PERCENT: 2 % (ref 0–5)
ESTRADIOL LEVEL: 23 PG/ML (ref 27–52)
HCT VFR BLD CALC: 44.6 % (ref 41–53)
HEMOGLOBIN: 15.2 G/DL (ref 13.5–17.5)
IMMATURE GRANULOCYTES: NORMAL %
LH: 5.7 U/L (ref 1.7–8.6)
LYMPHOCYTES # BLD: 26 % (ref 13–44)
MCH RBC QN AUTO: 29.3 PG (ref 26–34)
MCHC RBC AUTO-ENTMCNC: 34 G/DL (ref 31–37)
MCV RBC AUTO: 86.1 FL (ref 80–100)
MONOCYTES # BLD: 8 % (ref 5–9)
NRBC AUTOMATED: NORMAL PER 100 WBC
PDW BLD-RTO: 13.4 % (ref 12.1–15.2)
PLATELET # BLD: 194 K/UL (ref 140–450)
PLATELET ESTIMATE: NORMAL
PMV BLD AUTO: NORMAL FL (ref 6–12)
PROLACTIN: 10.22 UG/L (ref 4.04–15.2)
RBC # BLD: 5.18 M/UL (ref 4.5–5.9)
RBC # BLD: NORMAL 10*6/UL
SEG NEUTROPHILS: 63 % (ref 39–75)
SEGMENTED NEUTROPHILS ABSOLUTE COUNT: 4.4 K/UL (ref 2.1–6.5)
SEX HORMONE BINDING GLOBULIN: 19 NMOL/L (ref 11–80)
TESTOSTERONE FREE-NONMALE: 52.7 PG/ML (ref 47–244)
TESTOSTERONE TOTAL: 208 NG/DL (ref 220–1000)
WBC # BLD: 7 K/UL (ref 3.5–11)
WBC # BLD: NORMAL 10*3/UL

## 2020-12-14 PROCEDURE — 84403 ASSAY OF TOTAL TESTOSTERONE: CPT

## 2020-12-14 PROCEDURE — 36415 COLL VENOUS BLD VENIPUNCTURE: CPT

## 2020-12-14 PROCEDURE — 84146 ASSAY OF PROLACTIN: CPT

## 2020-12-14 PROCEDURE — 85025 COMPLETE CBC W/AUTO DIFF WBC: CPT

## 2020-12-14 PROCEDURE — 82670 ASSAY OF TOTAL ESTRADIOL: CPT

## 2020-12-14 PROCEDURE — 83002 ASSAY OF GONADOTROPIN (LH): CPT

## 2020-12-14 PROCEDURE — 84270 ASSAY OF SEX HORMONE GLOBUL: CPT

## 2020-12-30 ENCOUNTER — PATIENT MESSAGE (OUTPATIENT)
Dept: FAMILY MEDICINE CLINIC | Age: 60
End: 2020-12-30

## 2020-12-30 RX ORDER — FEXOFENADINE HCL AND PSEUDOEPHEDRINE HCI 60; 120 MG/1; MG/1
1 TABLET, EXTENDED RELEASE ORAL 2 TIMES DAILY
Qty: 60 TABLET | Refills: 5 | Status: SHIPPED | OUTPATIENT
Start: 2020-12-30 | End: 2021-08-14 | Stop reason: SDUPTHER

## 2020-12-30 RX ORDER — FEXOFENADINE HCL AND PSEUDOEPHEDRINE HCI 60; 120 MG/1; MG/1
1 TABLET, EXTENDED RELEASE ORAL 2 TIMES DAILY
Qty: 180 TABLET | Refills: 1 | Status: CANCELLED | OUTPATIENT
Start: 2020-12-30

## 2020-12-30 NOTE — TELEPHONE ENCOUNTER
Pharmacy typically already gives generic formulation. Since that med is available over-the-counter that may be why he is having to pay more for it. It would really be preferable for him to get off the decongestant component. Can he just take plain Allegra?

## 2020-12-30 NOTE — TELEPHONE ENCOUNTER
From: Brooklyn Crane  To: Tiffanie Avila MD  Sent: 12/30/2020 6:56 AM EST  Subject: Prescription Question    Could you change my Allegra D prescription to the generic brand so to save a little money? . Thanks

## 2021-03-23 ENCOUNTER — HOSPITAL ENCOUNTER (OUTPATIENT)
Age: 61
Discharge: HOME | End: 2021-03-23
Payer: MEDICARE

## 2021-03-23 LAB
ABSOLUTE EOS #: 0.1 K/UL (ref 0–0.4)
ABSOLUTE IMMATURE GRANULOCYTE: NORMAL K/UL (ref 0–0.3)
ABSOLUTE LYMPH #: 1.3 K/UL (ref 1–4.8)
ABSOLUTE MONO #: 0.5 K/UL (ref 0–1)
BASOPHILS # BLD: 1 % (ref 0–2)
BASOPHILS ABSOLUTE: 0 K/UL (ref 0–0.2)
DIFFERENTIAL TYPE: YES
EOSINOPHILS RELATIVE PERCENT: 1 % (ref 0–5)
HCT VFR BLD CALC: 44.5 % (ref 41–53)
HEMOGLOBIN: 15.2 G/DL (ref 13.5–17.5)
IMMATURE GRANULOCYTES: NORMAL %
LYMPHOCYTES # BLD: 23 % (ref 13–44)
MCH RBC QN AUTO: 30.4 PG (ref 26–34)
MCHC RBC AUTO-ENTMCNC: 34.2 G/DL (ref 31–37)
MCV RBC AUTO: 88.9 FL (ref 80–100)
MONOCYTES # BLD: 9 % (ref 5–9)
NRBC AUTOMATED: NORMAL PER 100 WBC
PDW BLD-RTO: 14.1 % (ref 12.1–15.2)
PLATELET # BLD: 172 K/UL (ref 140–450)
PLATELET ESTIMATE: NORMAL
PMV BLD AUTO: NORMAL FL (ref 6–12)
RBC # BLD: 5.01 M/UL (ref 4.5–5.9)
RBC # BLD: NORMAL 10*6/UL
SEG NEUTROPHILS: 66 % (ref 39–75)
SEGMENTED NEUTROPHILS ABSOLUTE COUNT: 3.8 K/UL (ref 2.1–6.5)
WBC # BLD: 5.8 K/UL (ref 3.5–11)
WBC # BLD: NORMAL 10*3/UL

## 2021-03-23 PROCEDURE — 83002 ASSAY OF GONADOTROPIN (LH): CPT

## 2021-03-23 PROCEDURE — 36415 COLL VENOUS BLD VENIPUNCTURE: CPT

## 2021-03-23 PROCEDURE — 84403 ASSAY OF TOTAL TESTOSTERONE: CPT

## 2021-03-23 PROCEDURE — 84270 ASSAY OF SEX HORMONE GLOBUL: CPT

## 2021-03-23 PROCEDURE — 85025 COMPLETE CBC W/AUTO DIFF WBC: CPT

## 2021-03-24 LAB
LH: 11.8 U/L (ref 1.7–8.6)
SEX HORMONE BINDING GLOBULIN: 42 NMOL/L (ref 11–80)
TESTOSTERONE FREE-NONMALE: 127.3 PG/ML (ref 47–244)
TESTOSTERONE TOTAL: 669 NG/DL (ref 220–1000)

## 2021-03-30 ENCOUNTER — HOSPITAL ENCOUNTER (OUTPATIENT)
Age: 61
Discharge: HOME | End: 2021-03-30
Payer: MEDICARE

## 2021-03-30 DIAGNOSIS — E78.00 PURE HYPERCHOLESTEROLEMIA: ICD-10-CM

## 2021-03-30 DIAGNOSIS — E11.9 TYPE 2 DIABETES MELLITUS WITHOUT COMPLICATION, WITHOUT LONG-TERM CURRENT USE OF INSULIN (HCC): ICD-10-CM

## 2021-03-30 LAB
ALBUMIN SERPL-MCNC: 4.4 G/DL (ref 3.5–5.2)
ALBUMIN/GLOBULIN RATIO: 1.5 (ref 1–2.5)
ALP BLD-CCNC: 74 U/L (ref 40–129)
ALT SERPL-CCNC: 20 U/L (ref 5–41)
ANION GAP SERPL CALCULATED.3IONS-SCNC: 8 MMOL/L (ref 9–17)
AST SERPL-CCNC: 28 U/L
BILIRUB SERPL-MCNC: 0.82 MG/DL (ref 0.3–1.2)
BUN BLDV-MCNC: 24 MG/DL (ref 6–20)
BUN/CREAT BLD: 28 (ref 9–20)
CALCIUM SERPL-MCNC: 9.3 MG/DL (ref 8.6–10.4)
CHLORIDE BLD-SCNC: 105 MMOL/L (ref 98–107)
CHOLESTEROL/HDL RATIO: 3.1
CHOLESTEROL: 168 MG/DL
CO2: 29 MMOL/L (ref 20–31)
CREAT SERPL-MCNC: 0.85 MG/DL (ref 0.7–1.2)
CREATININE URINE: 151.6 MG/DL (ref 39–259)
ESTIMATED AVERAGE GLUCOSE: 114 MG/DL
GFR AFRICAN AMERICAN: >60 ML/MIN
GFR NON-AFRICAN AMERICAN: >60 ML/MIN
GFR SERPL CREATININE-BSD FRML MDRD: ABNORMAL ML/MIN/{1.73_M2}
GFR SERPL CREATININE-BSD FRML MDRD: ABNORMAL ML/MIN/{1.73_M2}
GLUCOSE BLD-MCNC: 91 MG/DL (ref 70–99)
HBA1C MFR BLD: 5.6 % (ref 4–6)
HDLC SERPL-MCNC: 54 MG/DL
LDL CHOLESTEROL: 102 MG/DL (ref 0–130)
MICROALBUMIN/CREAT 24H UR: <12 MG/L
MICROALBUMIN/CREAT UR-RTO: NORMAL MCG/MG CREAT
POTASSIUM SERPL-SCNC: 4.4 MMOL/L (ref 3.7–5.3)
SODIUM BLD-SCNC: 142 MMOL/L (ref 135–144)
TOTAL PROTEIN: 7.3 G/DL (ref 6.4–8.3)
TRIGL SERPL-MCNC: 60 MG/DL
VLDLC SERPL CALC-MCNC: NORMAL MG/DL (ref 1–30)

## 2021-03-30 PROCEDURE — 36415 COLL VENOUS BLD VENIPUNCTURE: CPT

## 2021-03-30 PROCEDURE — 83036 HEMOGLOBIN GLYCOSYLATED A1C: CPT

## 2021-03-30 PROCEDURE — 80053 COMPREHEN METABOLIC PANEL: CPT

## 2021-03-30 PROCEDURE — 82043 UR ALBUMIN QUANTITATIVE: CPT

## 2021-03-30 PROCEDURE — 82570 ASSAY OF URINE CREATININE: CPT

## 2021-03-30 PROCEDURE — 80061 LIPID PANEL: CPT

## 2021-04-06 ENCOUNTER — OFFICE VISIT (OUTPATIENT)
Dept: FAMILY MEDICINE CLINIC | Age: 61
End: 2021-04-06
Payer: MEDICARE

## 2021-04-06 VITALS
HEIGHT: 68 IN | BODY MASS INDEX: 30.16 KG/M2 | WEIGHT: 199 LBS | HEART RATE: 88 BPM | DIASTOLIC BLOOD PRESSURE: 70 MMHG | SYSTOLIC BLOOD PRESSURE: 112 MMHG | OXYGEN SATURATION: 96 %

## 2021-04-06 DIAGNOSIS — Z00.00 ROUTINE GENERAL MEDICAL EXAMINATION AT A HEALTH CARE FACILITY: Primary | ICD-10-CM

## 2021-04-06 DIAGNOSIS — E78.00 PURE HYPERCHOLESTEROLEMIA: ICD-10-CM

## 2021-04-06 DIAGNOSIS — E11.9 TYPE 2 DIABETES MELLITUS WITHOUT COMPLICATION, WITHOUT LONG-TERM CURRENT USE OF INSULIN (HCC): ICD-10-CM

## 2021-04-06 DIAGNOSIS — G47.09 OTHER INSOMNIA: ICD-10-CM

## 2021-04-06 PROCEDURE — G0438 PPPS, INITIAL VISIT: HCPCS | Performed by: FAMILY MEDICINE

## 2021-04-06 PROCEDURE — 99213 OFFICE O/P EST LOW 20 MIN: CPT | Performed by: FAMILY MEDICINE

## 2021-04-06 RX ORDER — TRAZODONE HYDROCHLORIDE 50 MG/1
50 TABLET ORAL NIGHTLY
Qty: 90 TABLET | Refills: 1 | Status: SHIPPED | OUTPATIENT
Start: 2021-04-06 | End: 2021-12-27 | Stop reason: SDUPTHER

## 2021-04-06 RX ORDER — TAMSULOSIN HYDROCHLORIDE 0.4 MG/1
0.4 CAPSULE ORAL DAILY
Qty: 90 CAPSULE | Refills: 1 | Status: SHIPPED | OUTPATIENT
Start: 2021-04-06 | End: 2021-11-16 | Stop reason: SDUPTHER

## 2021-04-06 RX ORDER — BACLOFEN 10 MG/1
TABLET ORAL
Qty: 270 TABLET | Refills: 1 | Status: SHIPPED | OUTPATIENT
Start: 2021-04-06 | End: 2021-05-18 | Stop reason: SDUPTHER

## 2021-04-06 ASSESSMENT — ENCOUNTER SYMPTOMS
CONSTIPATION: 0
COUGH: 0
VOMITING: 0
DIARRHEA: 0
SHORTNESS OF BREATH: 0
BLOOD IN STOOL: 0
ABDOMINAL PAIN: 0
TROUBLE SWALLOWING: 0
NAUSEA: 0
EYE DISCHARGE: 0
EYE REDNESS: 0

## 2021-04-06 ASSESSMENT — PATIENT HEALTH QUESTIONNAIRE - PHQ9
SUM OF ALL RESPONSES TO PHQ QUESTIONS 1-9: 0
SUM OF ALL RESPONSES TO PHQ QUESTIONS 1-9: 0

## 2021-04-06 ASSESSMENT — LIFESTYLE VARIABLES: HOW OFTEN DO YOU HAVE A DRINK CONTAINING ALCOHOL: 0

## 2021-04-06 NOTE — PROGRESS NOTES
HPI Notes    Name: Seamus Velez  : 1960        Chief Complaint:     Chief Complaint   Patient presents with    Diabetes     3/30/21 HgA1c - 5.6    Insomnia    Allergies    Sleep Apnea    Hyperlipidemia     Pt stopped Crestor over the past couple months, Pt states it was causing pian in his Rt side. History of Present Illness: Seamus Velez is a 63 y.o.  male who presents with Diabetes (3/30/21 HgA1c - 5.6), Insomnia, Allergies, Sleep Apnea, and Hyperlipidemia (Pt stopped Crestor over the past couple months, Pt states it was causing pian in his Rt side.)      Diabetes  He presents for his follow-up diabetic visit. He has type 2 diabetes mellitus. His disease course has been stable. There are no hypoglycemic associated symptoms. Pertinent negatives for hypoglycemia include no confusion, dizziness, headaches, pallor or tremors. There are no diabetic associated symptoms. Pertinent negatives for diabetes include no chest pain and no fatigue. There are no hypoglycemic complications. There are no diabetic complications. Risk factors for coronary artery disease include diabetes mellitus, dyslipidemia and male sex. Current diabetic treatment includes diet. He is following a generally healthy and diabetic diet. His home blood glucose trend is decreasing rapidly (hgba1c down from 7.2 to 5.6. ). An ACE inhibitor/angiotensin II receptor blocker is not being taken. Eye exam is current. Hyperlipidemia  This is a chronic problem. The current episode started more than 1 year ago. The problem is controlled. Recent lipid tests were reviewed and are normal. Exacerbating diseases include diabetes. He has no history of hypothyroidism. Pertinent negatives include no chest pain, myalgias or shortness of breath. The current treatment provides significant improvement of lipids. Risk factors for coronary artery disease include male sex, dyslipidemia and diabetes mellitus.      Insomnia - chronic and better mouth daily  Patient not taking: Reported on 4/6/2021 11/2/20   Ginna Upton MD   blood glucose test strips [facility] TEST) strip Pt testing blood sugar one daily and as needed 4/9/19   Ginna Upton MD   CPAP Machine MISC by Does not apply route nightly    Historical Provider, MD        Allergies:       Darvocet a500 [propoxyphene n-acetaminophen]    Social History:     Tobacco:    reports that he quit smoking about 12 years ago. His smoking use included cigarettes. He has a 37.50 pack-year smoking history. He quit smokeless tobacco use about 5 years ago. His smokeless tobacco use included chew. Alcohol:      reports no history of alcohol use. Drug Use:  reports no history of drug use. Family History:     Family History   Problem Relation Age of Onset    Other Mother         dementia    Heart Disease Father     Heart Disease Brother        Review of Systems:       Review of Systems   Constitutional: Negative for chills, fatigue and fever. HENT: Negative for trouble swallowing. Eyes: Negative for discharge, redness and visual disturbance. Respiratory: Negative for cough and shortness of breath. Cardiovascular: Negative for chest pain and palpitations. Gastrointestinal: Negative for abdominal pain, blood in stool, constipation, diarrhea, nausea and vomiting. Genitourinary: Negative for dysuria and hematuria. Musculoskeletal: Negative for joint swelling, myalgias and neck pain. Skin: Negative for pallor and rash. Neurological: Negative for dizziness, tremors, light-headedness and headaches. Psychiatric/Behavioral: Positive for sleep disturbance. Negative for confusion. Physical Exam:     Physical Exam  Vitals signs reviewed. Constitutional:       General: He is not in acute distress. Appearance: He is well-developed. He is not ill-appearing. HENT:      Head: Normocephalic and atraumatic. Eyes:      General:         Right eye: No discharge.          Left eye: No Annual Wellness Visit in 1 year.       Electronically signed by Kumar Arreola MD on 4/6/2021 at 8:41 AM

## 2021-04-06 NOTE — PROGRESS NOTES
Medicare Annual Wellness Visit  Name: Sarah Schmidt Date: 2021   MRN: [id number] Sex: Male   Age: 63 y.o. Ethnicity: Non-/Non    : 1960 Race: Esperanza Martinez is here for Diabetes (3/30/21 HgA1c - 5.6), Insomnia, Allergies, Sleep Apnea, and Hyperlipidemia (Pt stopped Crestor over the past couple months, Pt states it was causing pian in his Rt side.)    Screenings for behavioral, psychosocial and functional/safety risks, and cognitive dysfunction are all negative except as indicated below. These results, as well as other patient data from the [address] form, are documented in Flowsheets linked to this Encounter. Allergies   Allergen Reactions    Darvocet A500 [Propoxyphene N-Acetaminophen] Other (See Comments)     Causes severe head-ache       Prior to Visit Medications    Medication Sig Taking? Authorizing Provider   traZODone (DESYREL) 50 MG tablet Take 1 tablet by mouth nightly Yes Fanny Huber MD   tamsulosin (FLOMAX) 0.4 MG capsule Take 1 capsule by mouth daily Yes Fanny Huber MD   baclofen (LIORESAL) 10 MG tablet 1 tablet po in AM and 2 tablets po in pm Yes Fanny Huber MD   fexofenadine-pseudoephedrine (ALLEGRA-D 12HR)  MG per extended release tablet Take 1 tablet by mouth 2 times daily Yes Zenaida Laguna DO   Nutritional Supplements (JOINT FORMULA PO) Take by mouth Yes Historical Provider, MD   Naproxen Sodium (ALEVE) 220 MG CAPS Take 1 tablet by mouth 2 times daily as needed for Pain.  Yes Historical Provider, MD   rosuvastatin (CRESTOR) 5 MG tablet Take 1 tablet by mouth daily  Patient not taking: Reported on 2021  Fanny Huber MD   blood glucose test strips (EXACTECH TEST) strip Pt testing blood sugar one daily and as needed  Fanny Huber MD   CPAP Machine MISC by Does not apply route nightly  Historical Provider, MD       Past Medical History:   Diagnosis Date    Back pain     chronic back pain    Insomnia     Osteoarthritis     Pneumonia     walking pneumonia    Sleep apnea     Type 2 diabetes mellitus without complication [facility])        Past Surgical History:   Procedure Laterality Date    BACK SURGERY  2000    CERVICAL FUSION  2007    COLONOSCOPY      COLONOSCOPY  05-    Dr. Steven Anderson (Biopsies)    COLONOSCOPY  11/06/2017    DR Severa Aye    KNEE CARTILAGE SURGERY Bilateral     [place] COLONOSCOPY W/BIOPSY SINGLE/MULTIPLE N/A 11/6/2017    COLONOSCOPY WITH BIOPSY/ polypectomies performed by Rayray Goncalves MD at [facility] OR       Family History   Problem Relation Age of Onset    Other Mother         dementia    Heart Disease Father     Heart Disease Brother        CareTeam (Including outside providers/suppliers regularly involved in providing care):   Patient Care Team:  Rc Colby MD as PCP - General (Family Medicine)  Rc Colby MD as PCP - [facility] Empaneled Provider  Whit Ramey RN as Imaging Navigator    Wt Readings from Last 3 Encounters:   04/06/21 199 lb (90.3 kg)   10/12/20 229 lb (103.9 kg)   10/06/20 229 lb (103.9 kg)     Vitals:    04/06/21 0803   BP: 112/70   Pulse: 88   SpO2: 96%   Weight: 199 lb (90.3 kg)   Height: 5' 8\" (1.727 m)     Body mass index is 30.26 kg/m². Based upon direct observation of the patient, evaluation of cognition reveals recent and remote memory intact. Patient's complete Health Risk Assessment and screening values have been reviewed and are found in Flowsheets. The following problems were reviewed today and where indicated follow up appointments were made and/or referrals ordered. Positive Risk Factor Screenings with Interventions:          General Health and ACP:  General  In general, how would you say your health is?: Fair  In the past 7 days, have you experienced any of the following?  New or Increased Pain, New or Increased Fatigue, Loneliness, Social Isolation, Stress or Anger?: (!) New or Increased Fatigue  Do you get the social and emotional support that you need?: Yes  Do you have a Living Will?: (!) No  Advance Directives     Power of [address] Will ACP-Advance Directive ACP-Power of     Not on File Not on File Not on File Not on File      General Health Risk Interventions:  · No Living Will: d/w pt the living will    Health Habits/Nutrition:  Health Habits/Nutrition  Do you exercise for at least 20 minutes 2-3 times per week?: Yes  Have you lost any weight without trying in the past 3 months?: No  Have you seen the dentist within the past year?: (!) No  Body mass index: (!) 30.25  Health Habits/Nutrition Interventions:  · Dental exam overdue:  patient encouraged to make appointment with his/her dentist    Hearing/Vision:  No exam data present  Hearing/Vision  Do you or your family notice any trouble with your hearing that hasn't been managed with hearing aids?: (!) Yes  Do you have difficulty driving, watching TV, or doing any of your daily activities because of your eyesight?: No  Have you had an eye exam within the past year?: Yes  Hearing/Vision Interventions:  · Hearing concerns:  patient declines any further evaluation/treatment for hearing issues      Personalized Preventive Plan   Current Health Maintenance Status  Immunization History   Administered Date(s) Administered    COVID-19, J&J, PF, 0.5 mL 03/04/2021    Influenza, Quadv, IM, PF (6 mo and older Fluzone, Flulaval, Fluarix, and 3 yrs and older Afluria) 09/27/2017, 10/30/2018, 02/10/2020, 10/06/2020    Pneumococcal Polysaccharide ([id number]) 10/06/2020        Health Maintenance   Topic Date Due    Hepatitis C screen  Never done    HIV screen  Never done    Hepatitis B vaccine (1 of 3 - Risk 3-dose series) Never done    DTaP/Tdap/Td vaccine (1 - Tdap) Never done    Shingles Vaccine (1 of 2) Never done   [employer] Visit (AWV)  Never done    Diabetic retinal exam  05/18/2021    Diabetic foot exam  10/06/2021    Low dose CT lung screening  11/02/2021    A1C test (Diabetic or Prediabetic)  03/30/2022    Diabetic microalbuminuria test  03/30/2022    Lipid screen  03/30/2022    Colon cancer screen colonoscopy  11/06/2022    Flu vaccine  Completed    Pneumococcal 0-64 years Vaccine  Completed    COVID-19 Vaccine  Completed    Hepatitis A vaccine  Aged Out    Hib vaccine  Aged Out    Meningococcal (ACWY) vaccine  Aged Out     Recommendations for REES46 Due: see orders and patient instructions/AVS.  . Recommended screening schedule for the next 5-10 years is provided to the patient in written form: see Patient Rose Branham was seen today for diabetes, insomnia, allergies, sleep apnea and hyperlipidemia. Diagnoses and all orders for this visit:    Type 2 diabetes mellitus without complication, without long-term current use of insulin ([address]    Pure hypercholesterolemia    Other insomnia    Routine general medical examination at a health care facility    Other orders  -     traZODone (DESYREL) 50 MG tablet; Take 1 tablet by mouth nightly  -     tamsulosin (FLOMAX) 0.4 MG capsule;  Take 1 capsule by mouth daily  -     baclofen (LIORESAL) 10 MG tablet; 1 tablet po in AM and 2 tablets po in pm

## 2021-04-06 NOTE — PATIENT INSTRUCTIONS
SURVEY:    You may be receiving a survey from [employer] regarding your visit today. Please complete the survey to enable us to provide the highest quality of care to you and your family. If you cannot score us a very good (5 stars) on any question, please call the office to discuss how we could have made your experience a very good one. Thank you. Clinical Care Team:  MD Regino Fortune LPN    Clerical Team:  Nieves Bianchi    Personalized Preventive Plan for Corina Washington - 4/6/2021  Medicare offers a range of preventive health benefits. Some of the tests and screenings are paid in full while other may be subject to a deductible, co-insurance, and/or copay. Some of these benefits include a comprehensive review of your medical history including lifestyle, illnesses that may run in your family, and various assessments and screenings as appropriate. After reviewing your medical record and screening and assessments performed today your provider may have ordered immunizations, labs, imaging, and/or referrals for you. A list of these orders (if applicable) as well as your Preventive Care list are included within your After Visit Summary for your review. Other Preventive Recommendations:    · A preventive eye exam performed by an eye specialist is recommended every 1-2 years to screen for glaucoma; cataracts, macular degeneration, and other eye disorders. · A preventive dental visit is recommended every 6 months. · Try to get at least 150 minutes of exercise per week or 10,000 steps per day on a pedometer . · Order or download the FREE \"Exercise & Physical Activity: Your Everyday Guide\" from [employer]. Call 4-871.796.6028 or search [employer] online. · You need 3987-9427 mg of calcium and 8596-2388 IU of vitamin D per day.  It is possible to meet your calcium requirement with diet alone, but a vitamin D supplement is usually necessary to meet this goal.  · When exposed to the sun, use a sunscreen that protects against both UVA and UVB radiation with an SPF of 30 or greater. Reapply every 2 to 3 hours or after sweating, drying off with a towel, or swimming. · Always wear a seat belt when traveling in a car. Always wear a helmet when riding a bicycle or motorcycle.

## 2021-05-19 RX ORDER — BACLOFEN 10 MG/1
TABLET ORAL
Qty: 270 TABLET | Refills: 1 | Status: SHIPPED | OUTPATIENT
Start: 2021-05-19 | End: 2021-10-06 | Stop reason: SDUPTHER

## 2021-05-19 NOTE — TELEPHONE ENCOUNTER
Last visit:  4/6/2021  Next Visit Date:    Future Appointments   Date Time Provider Kelsie Collado   10/6/2021  8:00 AM MD Ariadne Workman [facility]         Medication List:  Prior to Admission medications    Medication Sig Start Date End Date Taking? Authorizing Provider   traZODone (DESYREL) 50 MG tablet Take 1 tablet by mouth nightly 4/6/21   Shelby Castro MD   tamsulosin [facility]) 0.4 MG capsule Take 1 capsule by mouth daily 4/6/21   Shelby Castro MD   baclofen (LIORESAL) 10 MG tablet 1 tablet po in AM and 2 tablets po in pm 4/6/21   Shelby Castro MD   fexofenadine-pseudoephedrine (ALLEGRA-D 12HR)  MG per extended release tablet Take 1 tablet by mouth 2 times daily 12/30/20   Jason Ga DO   rosuvastatin (CRESTOR) 5 MG tablet Take 1 tablet by mouth daily  Patient not taking: Reported on 4/6/2021 11/2/20   Shelby Castro MD   blood glucose test strips (EXACTECH TEST) strip Pt testing blood sugar one daily and as needed 4/9/19   Shelby Castro MD   Nutritional Supplements (JOINT FORMULA PO) Take by mouth    Historical Provider, MD   CPAP Machine MISC by Does not apply route nightly    Historical Provider, MD   Naproxen Sodium (ALEVE) 220 MG CAPS Take 1 tablet by mouth 2 times daily as needed for Pain.     Historical Provider, MD

## 2021-08-11 ENCOUNTER — HOSPITAL ENCOUNTER (OUTPATIENT)
Age: 61
Discharge: HOME | End: 2021-08-11
Payer: MEDICARE

## 2021-08-11 LAB
ABSOLUTE EOS #: 0.08 K/UL (ref 0–0.44)
ABSOLUTE IMMATURE GRANULOCYTE: <0.03 K/UL (ref 0–0.3)
ABSOLUTE LYMPH #: 1.5 K/UL (ref 1.1–3.7)
ABSOLUTE MONO #: 0.67 K/UL (ref 0.1–1.2)
ALBUMIN SERPL-MCNC: 4.4 G/DL (ref 3.5–5.2)
ALBUMIN/GLOBULIN RATIO: 1.6 (ref 1–2.5)
ALP BLD-CCNC: 81 U/L (ref 40–129)
ALT SERPL-CCNC: 15 U/L (ref 5–41)
ANION GAP SERPL CALCULATED.3IONS-SCNC: 12 MMOL/L (ref 9–17)
AST SERPL-CCNC: 22 U/L
BASOPHILS # BLD: 1 % (ref 0–2)
BASOPHILS ABSOLUTE: 0.04 K/UL (ref 0–0.2)
BILIRUB SERPL-MCNC: 0.29 MG/DL (ref 0.3–1.2)
BUN BLDV-MCNC: 21 MG/DL (ref 6–20)
BUN/CREAT BLD: 23 (ref 9–20)
CALCIUM SERPL-MCNC: 9.1 MG/DL (ref 8.6–10.4)
CHLORIDE BLD-SCNC: 102 MMOL/L (ref 98–107)
CO2: 24 MMOL/L (ref 20–31)
CREAT SERPL-MCNC: 0.92 MG/DL (ref 0.7–1.2)
DIFFERENTIAL TYPE: NORMAL
EOSINOPHILS RELATIVE PERCENT: 1 % (ref 1–4)
GFR AFRICAN AMERICAN: >60 ML/MIN
GFR NON-AFRICAN AMERICAN: >60 ML/MIN
GFR SERPL CREATININE-BSD FRML MDRD: ABNORMAL ML/MIN/{1.73_M2}
GFR SERPL CREATININE-BSD FRML MDRD: ABNORMAL ML/MIN/{1.73_M2}
GLUCOSE BLD-MCNC: 104 MG/DL (ref 70–99)
HCT VFR BLD CALC: 44.1 % (ref 40.7–50.3)
HEMOGLOBIN: 14.6 G/DL (ref 13–17)
IMMATURE GRANULOCYTES: 0 %
LH: 17.6 U/L (ref 1.7–8.6)
LYMPHOCYTES # BLD: 25 % (ref 24–43)
MCH RBC QN AUTO: 30.2 PG (ref 25.2–33.5)
MCHC RBC AUTO-ENTMCNC: 33.1 G/DL (ref 28.4–34.8)
MCV RBC AUTO: 91.3 FL (ref 82.6–102.9)
MONOCYTES # BLD: 11 % (ref 3–12)
NRBC AUTOMATED: 0 PER 100 WBC
PDW BLD-RTO: 13.3 % (ref 11.8–14.4)
PLATELET # BLD: 167 K/UL (ref 138–453)
PLATELET ESTIMATE: NORMAL
PMV BLD AUTO: 11.3 FL (ref 8.1–13.5)
POTASSIUM SERPL-SCNC: 4.2 MMOL/L (ref 3.7–5.3)
RBC # BLD: 4.83 M/UL (ref 4.21–5.77)
RBC # BLD: NORMAL 10*6/UL
SEG NEUTROPHILS: 62 % (ref 36–65)
SEGMENTED NEUTROPHILS ABSOLUTE COUNT: 3.71 K/UL (ref 1.5–8.1)
SEX HORMONE BINDING GLOBULIN: 31 NMOL/L (ref 11–80)
SODIUM BLD-SCNC: 138 MMOL/L (ref 135–144)
TESTOSTERONE FREE-NONMALE: 102.4 PG/ML (ref 47–244)
TESTOSTERONE TOTAL: 474 NG/DL (ref 220–1000)
TOTAL PROTEIN: 7.2 G/DL (ref 6.4–8.3)
WBC # BLD: 6 K/UL (ref 3.5–11.3)
WBC # BLD: NORMAL 10*3/UL

## 2021-08-11 PROCEDURE — 83002 ASSAY OF GONADOTROPIN (LH): CPT

## 2021-08-11 PROCEDURE — 36415 COLL VENOUS BLD VENIPUNCTURE: CPT

## 2021-08-11 PROCEDURE — 84270 ASSAY OF SEX HORMONE GLOBUL: CPT

## 2021-08-11 PROCEDURE — 85025 COMPLETE CBC W/AUTO DIFF WBC: CPT

## 2021-08-11 PROCEDURE — 80053 COMPREHEN METABOLIC PANEL: CPT

## 2021-08-11 PROCEDURE — 84403 ASSAY OF TOTAL TESTOSTERONE: CPT

## 2021-08-16 RX ORDER — FEXOFENADINE HCL AND PSEUDOEPHEDRINE HCI 60; 120 MG/1; MG/1
1 TABLET, EXTENDED RELEASE ORAL 2 TIMES DAILY
Qty: 60 TABLET | Refills: 5 | Status: SHIPPED | OUTPATIENT
Start: 2021-08-16 | End: 2021-08-25 | Stop reason: SDUPTHER

## 2021-08-16 NOTE — TELEPHONE ENCOUNTER
Last OV: 4/6/2021 medicare dm insomnia allergies, HLD  Last RX:   Next scheduled apt: 10/6/2021      Sure scripts request    RX pending

## 2021-08-24 ENCOUNTER — PATIENT MESSAGE (OUTPATIENT)
Dept: FAMILY MEDICINE CLINIC | Age: 61
End: 2021-08-24

## 2021-08-25 ENCOUNTER — PATIENT MESSAGE (OUTPATIENT)
Dept: FAMILY MEDICINE CLINIC | Age: 61
End: 2021-08-25

## 2021-08-25 RX ORDER — FEXOFENADINE HCL AND PSEUDOEPHEDRINE HCI 60; 120 MG/1; MG/1
1 TABLET, EXTENDED RELEASE ORAL 2 TIMES DAILY
Qty: 60 TABLET | Refills: 5 | Status: SHIPPED | OUTPATIENT
Start: 2021-08-25 | End: 2022-02-24 | Stop reason: SDUPTHER

## 2021-08-25 NOTE — TELEPHONE ENCOUNTER
From: Ceci Martini  To: Ebony Srinivasan MD  Sent: 8/24/2021 8:49 PM EDT  Subject: Prescription Question    I need a new script for allegra-d, I am out in 3 days.  Thanks

## 2021-08-25 NOTE — TELEPHONE ENCOUNTER
Last OV: 4/6/2021 medicare  Last RX:   Next scheduled apt: 10/6/2021    Sure scripts request        RX pending

## 2021-08-26 ENCOUNTER — OFFICE VISIT (OUTPATIENT)
Dept: FAMILY MEDICINE CLINIC | Age: 61
End: 2021-08-26
Payer: MEDICARE

## 2021-08-26 VITALS
SYSTOLIC BLOOD PRESSURE: 112 MMHG | RESPIRATION RATE: 20 BRPM | BODY MASS INDEX: 30.93 KG/M2 | HEART RATE: 88 BPM | HEIGHT: 68 IN | DIASTOLIC BLOOD PRESSURE: 72 MMHG | WEIGHT: 204.1 LBS

## 2021-08-26 DIAGNOSIS — F33.1 MODERATE EPISODE OF RECURRENT MAJOR DEPRESSIVE DISORDER (HCC): Primary | ICD-10-CM

## 2021-08-26 PROCEDURE — 99213 OFFICE O/P EST LOW 20 MIN: CPT | Performed by: FAMILY MEDICINE

## 2021-08-26 RX ORDER — BUPROPION HYDROCHLORIDE 150 MG/1
150 TABLET ORAL EVERY MORNING
Qty: 30 TABLET | Refills: 1 | Status: SHIPPED | OUTPATIENT
Start: 2021-08-26 | End: 2021-10-06

## 2021-08-26 SDOH — ECONOMIC STABILITY: FOOD INSECURITY: WITHIN THE PAST 12 MONTHS, THE FOOD YOU BOUGHT JUST DIDN'T LAST AND YOU DIDN'T HAVE MONEY TO GET MORE.: NEVER TRUE

## 2021-08-26 SDOH — ECONOMIC STABILITY: FOOD INSECURITY: WITHIN THE PAST 12 MONTHS, YOU WORRIED THAT YOUR FOOD WOULD RUN OUT BEFORE YOU GOT MONEY TO BUY MORE.: NEVER TRUE

## 2021-08-26 ASSESSMENT — PATIENT HEALTH QUESTIONNAIRE - PHQ9
3. TROUBLE FALLING OR STAYING ASLEEP: 3
SUM OF ALL RESPONSES TO PHQ9 QUESTIONS 1 & 2: 5
7. TROUBLE CONCENTRATING ON THINGS, SUCH AS READING THE NEWSPAPER OR WATCHING TELEVISION: 3
SUM OF ALL RESPONSES TO PHQ QUESTIONS 1-9: 20
4. FEELING TIRED OR HAVING LITTLE ENERGY: 3
SUM OF ALL RESPONSES TO PHQ QUESTIONS 1-9: 20
10. IF YOU CHECKED OFF ANY PROBLEMS, HOW DIFFICULT HAVE THESE PROBLEMS MADE IT FOR YOU TO DO YOUR WORK, TAKE CARE OF THINGS AT HOME, OR GET ALONG WITH OTHER PEOPLE: 1
SUM OF ALL RESPONSES TO PHQ QUESTIONS 1-9: 19
9. THOUGHTS THAT YOU WOULD BE BETTER OFF DEAD, OR OF HURTING YOURSELF: 1
2. FEELING DOWN, DEPRESSED OR HOPELESS: 3
5. POOR APPETITE OR OVEREATING: 0
8. MOVING OR SPEAKING SO SLOWLY THAT OTHER PEOPLE COULD HAVE NOTICED. OR THE OPPOSITE, BEING SO FIGETY OR RESTLESS THAT YOU HAVE BEEN MOVING AROUND A LOT MORE THAN USUAL: 2
1. LITTLE INTEREST OR PLEASURE IN DOING THINGS: 2
6. FEELING BAD ABOUT YOURSELF - OR THAT YOU ARE A FAILURE OR HAVE LET YOURSELF OR YOUR FAMILY DOWN: 3

## 2021-08-26 ASSESSMENT — COLUMBIA-SUICIDE SEVERITY RATING SCALE - C-SSRS
1. WITHIN THE PAST MONTH, HAVE YOU WISHED YOU WERE DEAD OR WISHED YOU COULD GO TO SLEEP AND NOT WAKE UP?: YES
2. HAVE YOU ACTUALLY HAD ANY THOUGHTS OF KILLING YOURSELF?: NO
6. HAVE YOU EVER DONE ANYTHING, STARTED TO DO ANYTHING, OR PREPARED TO DO ANYTHING TO END YOUR LIFE?: NO

## 2021-08-26 ASSESSMENT — ENCOUNTER SYMPTOMS
SHORTNESS OF BREATH: 0
NAUSEA: 0
DIARRHEA: 0
VOMITING: 0

## 2021-08-26 ASSESSMENT — SOCIAL DETERMINANTS OF HEALTH (SDOH): HOW HARD IS IT FOR YOU TO PAY FOR THE VERY BASICS LIKE FOOD, HOUSING, MEDICAL CARE, AND HEATING?: NOT HARD AT ALL

## 2021-08-26 NOTE — TELEPHONE ENCOUNTER
From: David Amanda  To: Abner Santana MD  Sent: 8/25/2021 9:41 PM EDT  Subject: Prescription Question    Can I get something for depression?  Thanks

## 2021-08-26 NOTE — PROGRESS NOTES
Date    BACK SURGERY  2000    CERVICAL FUSION  2007    COLONOSCOPY      COLONOSCOPY  05-    Dr. [place] Beaver (Biopsies)    COLONOSCOPY  11/06/2017    DR Brennan Solitario    KNEE CARTILAGE SURGERY Bilateral     ME COLONOSCOPY W/BIOPSY SINGLE/MULTIPLE N/A 11/6/2017    COLONOSCOPY WITH BIOPSY/ polypectomies performed by Carmen Sena MD at [facility] OR        Medications:       Prior to Admission medications    Medication Sig Start Date End Date Taking? Authorizing Provider   buPROPion (WELLBUTRIN XL) 150 MG extended release tablet Take 1 tablet by mouth every morning 8/26/21  Yes Yina Molina MD   fexofenadine-pseudoephedrine (ALLEGRA-D 12HR)  MG per extended release tablet Take 1 tablet by mouth 2 times daily 8/25/21  Yes Yina Molina MD   baclofen (LIORESAL) 10 MG tablet 1 tablet po in AM and 2 tablets po in pm 5/19/21  Yes Yina Molina MD   traZODone (DESYREL) 50 MG tablet Take 1 tablet by mouth nightly 4/6/21  Yes Yina Molina MD   tamsulosin [facility]) 0.4 MG capsule Take 1 capsule by mouth daily 4/6/21  Yes Yina Molina MD   Naproxen Sodium (ALEVE) 220 MG CAPS Take 1 tablet by mouth 2 times daily as needed for Pain. Yes Historical Provider, MD   blood glucose test strips (EXACTECH TEST) strip Pt testing blood sugar one daily and as needed 4/9/19   Yina Molina MD   Nutritional Supplements (JOINT FORMULA PO) Take by mouth    Historical Provider, MD   CPAP Machine MISC by Does not apply route nightly    Historical Provider, MD        Allergies:       Darvocet a500 [propoxyphene n-acetaminophen]    Social History:     Tobacco:    reports that he quit smoking about 12 years ago. His smoking use included cigarettes. He has a 37.50 pack-year smoking history. He quit smokeless tobacco use about 5 years ago. His smokeless tobacco use included chew. Alcohol:      reports no history of alcohol use. Drug Use:  reports no history of drug use.     Family History:     Family History   Problem Relation Age of Onset    Other Mother         dementia    Heart Disease Father     Heart Disease Brother        Review of Systems:       Review of Systems   Constitutional: Negative for chills and fever. Respiratory: Negative for shortness of breath. Cardiovascular: Negative for chest pain. Gastrointestinal: Negative for diarrhea, nausea and vomiting. Skin: Negative for rash. Psychiatric/Behavioral: Positive for dysphoric mood. Negative for sleep disturbance and suicidal ideas. Physical Exam:     Physical Exam  Vitals reviewed. Constitutional:       General: He is not in acute distress. Appearance: Normal appearance. He is not ill-appearing. Cardiovascular:      Heart sounds: Normal heart sounds. No murmur heard. Pulmonary:      Effort: Pulmonary effort is normal. No respiratory distress. Breath sounds: Normal breath sounds. Neurological:      Mental Status: He is alert. Psychiatric:         Mood and Affect: Affect normal. Mood is depressed. Speech: Speech normal.         Behavior: Behavior normal. Behavior is cooperative. Thought Content:  Thought content normal.         Vitals:  /72 (Site: Left Upper Arm, Position: Sitting, Cuff Size: Large Adult)   Pulse 88   Resp 20   Ht 5' 8\" (1.727 m)   Wt 204 lb 1.6 oz (92.6 kg)   BMI 31.03 kg/m²       Data:     Lab Results   Component Value Date     08/11/2021    K 4.2 08/11/2021     08/11/2021    CO2 24 08/11/2021    BUN 21 08/11/2021    CREATININE 0.92 08/11/2021    GLUCOSE 104 08/11/2021    PROT 7.2 08/11/2021    LABALBU 4.4 08/11/2021    BILITOT 0.29 08/11/2021    ALKPHOS 81 08/11/2021    AST 22 08/11/2021    ALT 15 08/11/2021     Lab Results   Component Value Date    WBC 6.0 08/11/2021    RBC 4.83 08/11/2021    HGB 14.6 08/11/2021    HCT 44.1 08/11/2021    MCV 91.3 08/11/2021    MCH 30.2 08/11/2021    MCHC 33.1 08/11/2021    RDW 13.3 08/11/2021     08/11/2021    MPV 11.3 08/11/2021 Lab Results   Component Value Date    TSH 2.18 10/06/2020     Lab Results   Component Value Date    CHOL 168 03/30/2021    CHOL 188 11/01/2018    HDL 54 03/30/2021    PSA 0.50 11/01/2018    LABA1C 5.6 03/30/2021          Assessment/Plan:        1. Moderate episode of recurrent major depressive disorder (HCC)  D/w pt starting wellbutrin 150mg and then erasto in 1mos. reviewed with pt benefits and side effects. Pt to call if concerns or any suicidal thoughts prior to appt in 1mos. Pt''s wife coming home tomorrow so pt will start the medication tomorrow. Return in about 4 weeks (around 9/23/2021).       Electronically signed by Edda Frye MD on 8/26/2021 at 5:39 PM

## 2021-09-03 PROBLEM — R79.89 LOW TESTOSTERONE IN MALE: Status: ACTIVE | Noted: 2021-09-03

## 2021-09-04 ENCOUNTER — PATIENT MESSAGE (OUTPATIENT)
Dept: FAMILY MEDICINE CLINIC | Age: 61
End: 2021-09-04

## 2021-09-06 ENCOUNTER — PATIENT MESSAGE (OUTPATIENT)
Dept: FAMILY MEDICINE CLINIC | Age: 61
End: 2021-09-06

## 2021-09-06 DIAGNOSIS — M25.559 HIP PAIN: Primary | ICD-10-CM

## 2021-09-07 NOTE — TELEPHONE ENCOUNTER
From: Reanna Mina  To: Travis Clark MD  Sent: 9/4/2021 7:01 AM EDT  Subject: Non-Urgent Medical Question    I need to change my 10-6-21   Appointment, [place].  Thanks

## 2021-09-07 NOTE — TELEPHONE ENCOUNTER
From: Lizzy Eugene  To: Sam Rowland MD  Sent: 9/6/2021 8:46 AM EDT  Subject: Non-Urgent Medical Question    I finished the prednisone and have no improvement. Can I get a referral to an in network orthopedic physician please?     Thank you

## 2021-10-06 ENCOUNTER — OFFICE VISIT (OUTPATIENT)
Dept: FAMILY MEDICINE CLINIC | Age: 61
End: 2021-10-06
Payer: MEDICARE

## 2021-10-06 VITALS
HEART RATE: 70 BPM | WEIGHT: 201 LBS | HEIGHT: 68 IN | DIASTOLIC BLOOD PRESSURE: 70 MMHG | BODY MASS INDEX: 30.46 KG/M2 | OXYGEN SATURATION: 96 % | SYSTOLIC BLOOD PRESSURE: 104 MMHG

## 2021-10-06 DIAGNOSIS — F41.9 ANXIETY AND DEPRESSION: Primary | ICD-10-CM

## 2021-10-06 DIAGNOSIS — M25.559 HIP PAIN: ICD-10-CM

## 2021-10-06 DIAGNOSIS — R25.2 LEG CRAMPS: ICD-10-CM

## 2021-10-06 DIAGNOSIS — F32.A ANXIETY AND DEPRESSION: Primary | ICD-10-CM

## 2021-10-06 DIAGNOSIS — Z23 NEED FOR INFLUENZA VACCINATION: ICD-10-CM

## 2021-10-06 DIAGNOSIS — R05.8 ALLERGIC COUGH: ICD-10-CM

## 2021-10-06 DIAGNOSIS — N40.0 BENIGN PROSTATIC HYPERPLASIA WITHOUT LOWER URINARY TRACT SYMPTOMS: ICD-10-CM

## 2021-10-06 DIAGNOSIS — E11.9 TYPE 2 DIABETES MELLITUS WITHOUT COMPLICATION, WITHOUT LONG-TERM CURRENT USE OF INSULIN (HCC): ICD-10-CM

## 2021-10-06 PROCEDURE — 99214 OFFICE O/P EST MOD 30 MIN: CPT | Performed by: FAMILY MEDICINE

## 2021-10-06 RX ORDER — MONTELUKAST SODIUM 10 MG/1
10 TABLET ORAL NIGHTLY
Qty: 30 TABLET | Refills: 1 | Status: SHIPPED | OUTPATIENT
Start: 2021-10-06 | End: 2021-10-31 | Stop reason: SDUPTHER

## 2021-10-06 RX ORDER — BUPROPION HYDROCHLORIDE 300 MG/1
300 TABLET ORAL EVERY MORNING
Qty: 90 TABLET | Refills: 1 | Status: SHIPPED | OUTPATIENT
Start: 2021-10-06 | End: 2022-08-25 | Stop reason: SDUPTHER

## 2021-10-06 RX ORDER — BACLOFEN 10 MG/1
TABLET ORAL
Qty: 360 TABLET | Refills: 1 | Status: SHIPPED | OUTPATIENT
Start: 2021-10-06 | End: 2022-05-18 | Stop reason: SDUPTHER

## 2021-10-06 ASSESSMENT — ENCOUNTER SYMPTOMS
NAUSEA: 0
VOMITING: 0
ABDOMINAL PAIN: 0
TROUBLE SWALLOWING: 0
SHORTNESS OF BREATH: 0
BLOOD IN STOOL: 0
EYE REDNESS: 0
EYE DISCHARGE: 0
CONSTIPATION: 0
COUGH: 1
DIARRHEA: 0
FACIAL SWELLING: 0

## 2021-10-06 NOTE — PROGRESS NOTES
HPI Notes    Name: Firman Hodgkin  : 1960        Chief Complaint:     Chief Complaint   Patient presents with   [address] Problem     1 month f/u on wellburtrin.  Benign Prostatic Hypertrophy    Allergies    Insomnia    Sleep Apnea     not wearing cpap, difficulty fitting       History of Present Illness: Firman Hodgkin is a 63 y.o.  male who presents with Mental Health Problem (1 month f/u on wellburtrin.), Benign Prostatic Hypertrophy, Allergies, Insomnia, and Sleep Apnea (not wearing cpap, difficulty fitting)      Mental Health Problem  Primary symptoms comment: Pt is doing better on the wellbutrin 150mg. Pt states his depression is better but now he is having more anxiety. He worries about his wife as she is a nurse in a residential. . The current episode started more than 1 month ago. This is a new problem. Precipitated by: still having some anxiety. The degree of incapacity that he is experiencing as a consequence of his illness is mild. Additional symptoms of the illness include insomnia. Additional symptoms of the illness do not include appetite change, unexpected weight change, fatigue, agitation, headaches or abdominal pain. He does not admit to suicidal ideas. He does not have a plan to attempt suicide. Benign Prostatic Hypertrophy  This is a chronic problem. The current episode started more than 1 year ago. The problem is unchanged. Pertinent negatives include no chills, dysuria, hematuria, nausea or vomiting. Past treatments include tamsulosin. The treatment provided significant relief. Hip Pain   There was no injury mechanism. The pain is present in the right hip. The quality of the pain is described as aching. The pain is mild. The pain has been improving (pt states pain is better since he got back on his joint supplement ) since onset. Pertinent negatives include no numbness or tingling. Treatments tried: joint supplement with glucosamine.    Diabetes  He presents for his follow-up diabetic visit. He has type 2 diabetes mellitus. Pertinent negatives for hypoglycemia include no confusion, dizziness, headaches, pallor or tremors. There are no diabetic associated symptoms. Pertinent negatives for diabetes include no chest pain and no fatigue. Risk factors for coronary artery disease include diabetes mellitus and male sex. Current diabetic treatment includes diet. He is compliant with treatment most of the time. His weight is stable. He is following a generally healthy diet. Leg cramps - pt having more \"elba horses\". Pt taking 1 baclofen in am and 2 in pm. Pt wondering what else he could take or do     Allergies cough- chronic but pt has had increased cough over the past 1mos. No F/C. No sore throat. Pt states just has a \"tickle\" in back of the throat. Pt has more when he lies back in recliner or goes to sleep at night. No F/C, no sore throat.      Past Medical History:     Past Medical History:   Diagnosis Date    Back pain     chronic back pain    Insomnia     Osteoarthritis     Pneumonia     walking pneumonia    Sleep apnea     Type 2 diabetes mellitus without complication ([facility] [address]       Reviewed all health maintenance requirements and ordered appropriate tests  Health Maintenance Due   Topic Date Due    Hepatitis C screen  Never done    HIV screen  Never done    Hepatitis B vaccine (1 of 3 - Risk 3-dose series) Never done    DTaP/Tdap/Td vaccine (1 - Tdap) Never done    Shingles Vaccine (1 of 2) Never done    Diabetic retinal exam  05/18/2021    Flu vaccine (1) 09/01/2021       Past Surgical History:     Past Surgical History:   Procedure Laterality Date    BACK SURGERY  2000    CERVICAL FUSION  2007    COLONOSCOPY      COLONOSCOPY  05-    Dr. Giana Bentley (Biopsies)    COLONOSCOPY  11/06/2017    DR Alysha Schafer    KNEE CARTILAGE SURGERY Bilateral     MS COLONOSCOPY W/BIOPSY SINGLE/MULTIPLE N/A 11/6/2017    COLONOSCOPY WITH BIOPSY/ polypectomies performed by Yo Verma MD at [facility] OR        Medications:       Prior to Admission medications    Medication Sig Start Date End Date Taking? Authorizing Provider   baclofen (LIORESAL) 10 MG tablet 2 tablets po in AM and 2 tablets po in pm 10/6/21  Yes Devin Dumont MD   buPROPion (WELLBUTRIN XL) 300 MG extended release tablet Take 1 tablet by mouth every morning 10/6/21  Yes Devin Dumont MD   montelukast (SINGULAIR) 10 MG tablet Take 1 tablet by mouth nightly 10/6/21  Yes Devin Dumont MD   fexofenadine-pseudoephedrine (ALLEGRA-D 12HR)  MG per extended release tablet Take 1 tablet by mouth 2 times daily 8/25/21  Yes Devin Dmuont MD   traZODone (DESYREL) 50 MG tablet Take 1 tablet by mouth nightly 4/6/21  Yes Devin Dumont MD   tamsulosin [facility]) 0.4 MG capsule Take 1 capsule by mouth daily 4/6/21  Yes Devin Dumont MD   Nutritional Supplements (JOINT FORMULA PO) Take by mouth   Yes Historical Provider, MD   blood glucose test strips (EXACTECH TEST) strip Pt testing blood sugar one daily and as needed 4/9/19   Devin Dumont MD   Naproxen Sodium (ALEVE) 220 MG CAPS Take 1 tablet by mouth 2 times daily as needed for Pain. Historical Provider, MD        Allergies:       Darvocet a500 [propoxyphene n-acetaminophen]    Social History:     Tobacco:    reports that he quit smoking about 13 years ago. His smoking use included cigarettes. He has a 37.50 pack-year smoking history. He quit smokeless tobacco use about 5 years ago. His smokeless tobacco use included chew. Alcohol:      reports no history of alcohol use. Drug Use:  reports no history of drug use. Family History:     Family History   Problem Relation Age of Onset    Other Mother         dementia    Heart Disease Father     Heart Disease Brother        Review of Systems:       Review of Systems   Constitutional: Negative for appetite change, chills, fatigue, fever and unexpected weight change.    HENT: Negative for congestion, facial swelling and trouble swallowing. Eyes: Negative for discharge, redness and visual disturbance. Respiratory: Positive for cough. Negative for shortness of breath. Cardiovascular: Negative for chest pain and palpitations. Gastrointestinal: Negative for abdominal pain, blood in stool, constipation, diarrhea, nausea and vomiting. Genitourinary: Negative for dysuria and hematuria. Musculoskeletal: Negative for joint swelling and neck pain. Leg cramps   Skin: Negative for pallor and rash. Neurological: Negative for dizziness, tingling, tremors, light-headedness, numbness and headaches. Psychiatric/Behavioral: Negative for agitation and confusion. The patient has insomnia. Physical Exam:     Physical Exam  Constitutional:       General: He is not in acute distress. Appearance: Normal appearance. He is well-developed. He is not ill-appearing. HENT:      Head: Normocephalic and atraumatic. Nose:      Right Turbinates: Swollen and pale. Left Turbinates: Swollen and pale. Comments: Post nasal drainage  Eyes:      Conjunctiva/sclera: Conjunctivae normal.      Pupils: Pupils are equal, round, and reactive to light. Neck:      Thyroid: No thyromegaly. Vascular: No carotid bruit. Cardiovascular:      Rate and Rhythm: Normal rate and regular rhythm. Heart sounds: No murmur heard. Pulmonary:      Effort: Pulmonary effort is normal.      Breath sounds: Normal breath sounds. Abdominal:      General: Bowel sounds are normal.      Palpations: Abdomen is soft. Tenderness: There is no abdominal tenderness. Musculoskeletal:         General: No tenderness. Cervical back: Neck supple. Right lower leg: No edema. Left lower leg: No edema. Lymphadenopathy:      Cervical: No cervical adenopathy. Skin:     General: Skin is warm and dry. Findings: No rash.    Neurological:      Mental Status: He is alert and oriented to person, place, and time. Vitals:  /70   Pulse 70   Ht 5' 8\" (1.727 m)   Wt 201 lb (91.2 kg)   SpO2 96%   BMI 30.56 kg/m²       Data:     Lab Results   Component Value Date     08/11/2021    K 4.2 08/11/2021     08/11/2021    CO2 24 08/11/2021    BUN 21 08/11/2021    CREATININE 0.92 08/11/2021    GLUCOSE 104 08/11/2021    PROT 7.2 08/11/2021    LABALBU 4.4 08/11/2021    BILITOT 0.29 08/11/2021    ALKPHOS 81 08/11/2021    AST 22 08/11/2021    ALT 15 08/11/2021     Lab Results   Component Value Date    WBC 6.0 08/11/2021    RBC 4.83 08/11/2021    HGB 14.6 08/11/2021    HCT 44.1 08/11/2021    MCV 91.3 08/11/2021    MCH 30.2 08/11/2021    MCHC 33.1 08/11/2021    RDW 13.3 08/11/2021     08/11/2021    MPV 11.3 08/11/2021     Lab Results   Component Value Date    TSH 2.18 10/06/2020     Lab Results   Component Value Date    CHOL 168 03/30/2021    CHOL 188 11/01/2018    HDL 54 03/30/2021    PSA 0.50 11/01/2018    LABA1C 5.6 03/30/2021          Assessment/Plan:        1. Anxiety and depression  Pt to increase his wellbutrin from 150mg to 300mg     2. Benign prostatic hyperplasia without lower urinary tract symptoms  Stable on flomax     3. Hip pain  Improved on joint supplement with glucosamine    4. Type 2 diabetes mellitus without complication, without long-term current use of insulin ([facility])  F/U 6mos, prior CMP, Lipids, HgbA1C. Do daily foot checks and yearly eye exams  Stable on DM diet control  - Lipid Panel; Future  - Microalbumin, Ur; Future  - Comprehensive Metabolic Panel; Future  - Hemoglobin A1C; Future  -  DIABETES FOOT EXAM    5. Leg cramps  Increase baclofen to 2 in am and 2 in pm   - baclofen (LIORESAL) 10 MG tablet; 2 tablets po in AM and 2 tablets po in pm  Dispense: 360 tablet; Refill: 1    6. Need for influenza vaccination  Shot given   - INFLUENZA, MDCK QUADV, 2 YRS AND OLDER, IM, PF, PREFILL SYR OR SDV, 0.5ML (FLUCELVAX QUADV, PF)    7.  Allergic cough  Pt to add singulair and try for 1-2 mos        Neymar Daniels received counseling on the following healthy behaviors: nutrition and exercise  Reviewed prior labs and health maintenance  Continue current medications, diet and exercise. Discussed use, benefit, and side effects of prescribed medications. Barriers to medication compliance addressed. Patient given educational materials - see patient instructions  Was a self-tracking handout given in paper form or via [employer]? Yes    Requested Prescriptions     Signed Prescriptions Disp Refills    baclofen (LIORESAL) 10 MG tablet 360 tablet 1     Si tablets po in AM and 2 tablets po in pm    buPROPion (WELLBUTRIN XL) 300 MG extended release tablet 90 tablet 1     Sig: Take 1 tablet by mouth every morning    montelukast (SINGULAIR) 10 MG tablet 30 tablet 1     Sig: Take 1 tablet by mouth nightly       All patient questions answered. Patient voiced understanding. Quality Measures    Body mass index is 30.56 kg/m². Elevated. Weight control planned discussed Healthy diet and regular exercise. BP: 104/70 Blood pressure is normal. Treatment plan consists of No treatment change needed. Lab Results   Component Value Date     [address] 2018    LDLCHOLESTEROL 102 2021    (goal LDL reduction with dx if diabetes is 50% LDL reduction)      PHQ Scores 2021   PHQ2 Score 5 0 0 1 0 2 2   PHQ9 Score 20 0 0 4 0 2 2     Interpretation of Total Score Depression Severity: 1-4 = Minimal depression, 5-9 = Mild depression, 10-14 = Moderate depression, 15-19 = Moderately severe depression, 20-27 = Severe depression      Return in about 6 months (around 2022) for DM, Anxiety, depression.       Electronically signed by Cruz Castellon MD on 10/6/2021 at 8:58 PM

## 2021-10-06 NOTE — PATIENT INSTRUCTIONS
Survey: You may be receiving a survey from [employer] regarding your visit today. You may get this in the mail, through your MyChart or in your email. Please complete the survey to enable us to provide the highest quality of care to you and your family. Please also, mention our names. If you cannot score us as very good (5 Stars) on any question, please feel free to call the office to discuss how we could have made your experience exceptional.      Thank You!         MD Yola Brady LPN

## 2021-10-07 PROCEDURE — G0008 ADMIN INFLUENZA VIRUS VAC: HCPCS | Performed by: FAMILY MEDICINE

## 2021-10-07 PROCEDURE — 90674 CCIIV4 VAC NO PRSV 0.5 ML IM: CPT | Performed by: FAMILY MEDICINE

## 2021-10-19 ENCOUNTER — TELEPHONE (OUTPATIENT)
Dept: ONCOLOGY | Age: 61
End: 2021-10-19

## 2021-10-19 DIAGNOSIS — Z87.891 PERSONAL HISTORY OF NICOTINE DEPENDENCE: Primary | ICD-10-CM

## 2021-10-19 NOTE — TELEPHONE ENCOUNTER
Our records indicate that your patient is coming due for their annual lung cancer screening follow up testing. For your convenience, we have pended the order for the scan for you. If you do not agree with the need for the test, please cancel the order and let us know. Sincerely,    [address] [place] [address] Screening Program    Auto printed reminder letter sent to patient.

## 2021-10-19 NOTE — TELEPHONE ENCOUNTER
Please call pt and tell him he is due and recommended  for his annual lung cancer screening CT scan and would he like to schedule it? ?

## 2021-11-03 ENCOUNTER — HOSPITAL ENCOUNTER (OUTPATIENT)
Dept: CT IMAGING | Age: 61
Discharge: HOME | End: 2021-11-05
Payer: MEDICARE

## 2021-11-03 DIAGNOSIS — Z87.891 PERSONAL HISTORY OF NICOTINE DEPENDENCE: ICD-10-CM

## 2021-11-03 PROCEDURE — 71271 CT THORAX LUNG CANCER SCR C-: CPT

## 2021-11-04 ENCOUNTER — PATIENT MESSAGE (OUTPATIENT)
Dept: FAMILY MEDICINE CLINIC | Age: 61
End: 2021-11-04

## 2021-11-04 NOTE — TELEPHONE ENCOUNTER
From: Ramirez Urbina  To: Hailey Larose MD  Sent: 11/4/2021 4:21 AM EDT  Subject: Test Results Question    I have a appointment today @2:20. But think about it, I probably should wait until the results of my scan come back and then make the appointment. Unless the results come back before my appointment then we need to reschedule it.  Thanks

## 2021-11-04 NOTE — TELEPHONE ENCOUNTER
Tell pt yes sorry haven't seen his CT results so will call when we do and then in meantime have him re schedule

## 2021-11-15 ENCOUNTER — HOSPITAL ENCOUNTER (OUTPATIENT)
Age: 61
Discharge: HOME | End: 2021-11-15
Payer: MEDICARE

## 2021-11-15 LAB
ABSOLUTE EOS #: ABNORMAL K/UL (ref 0–0.4)
ABSOLUTE IMMATURE GRANULOCYTE: ABNORMAL K/UL (ref 0–0.3)
ABSOLUTE LYMPH #: 1.21 K/UL (ref 1–4.8)
ABSOLUTE MONO #: 0.85 K/UL (ref 0–1)
ALBUMIN SERPL-MCNC: 4.3 G/DL (ref 3.5–5.2)
ALBUMIN/GLOBULIN RATIO: ABNORMAL (ref 1–2.5)
ALP BLD-CCNC: 81 U/L (ref 40–129)
ALT SERPL-CCNC: 19 U/L (ref 5–41)
ANION GAP SERPL CALCULATED.3IONS-SCNC: 9 MMOL/L (ref 9–17)
AST SERPL-CCNC: 21 U/L
BASOPHILS # BLD: ABNORMAL % (ref 0–2)
BASOPHILS ABSOLUTE: ABNORMAL K/UL (ref 0–0.2)
BILIRUB SERPL-MCNC: 0.27 MG/DL (ref 0.3–1.2)
BUN BLDV-MCNC: 11 MG/DL (ref 6–20)
BUN/CREAT BLD: 13 (ref 9–20)
CALCIUM SERPL-MCNC: 9.3 MG/DL (ref 8.6–10.4)
CHLORIDE BLD-SCNC: 104 MMOL/L (ref 98–107)
CO2: 26 MMOL/L (ref 20–31)
CREAT SERPL-MCNC: 0.85 MG/DL (ref 0.7–1.2)
DIFFERENTIAL TYPE: ABNORMAL
EOSINOPHILS RELATIVE PERCENT: ABNORMAL % (ref 0–5)
ESTRADIOL LEVEL: 60 PG/ML (ref 27–52)
GFR AFRICAN AMERICAN: >60 ML/MIN
GFR NON-AFRICAN AMERICAN: >60 ML/MIN
GFR SERPL CREATININE-BSD FRML MDRD: ABNORMAL ML/MIN/{1.73_M2}
GFR SERPL CREATININE-BSD FRML MDRD: ABNORMAL ML/MIN/{1.73_M2}
GLUCOSE BLD-MCNC: 103 MG/DL (ref 70–99)
HCT VFR BLD CALC: 45.9 % (ref 41–53)
HEMOGLOBIN: 15.8 G/DL (ref 13.5–17.5)
IMMATURE GRANULOCYTES: ABNORMAL %
LH: 24.9 U/L (ref 1.7–8.6)
LYMPHOCYTES # BLD: 17 % (ref 13–44)
MCH RBC QN AUTO: 30.2 PG (ref 26–34)
MCHC RBC AUTO-ENTMCNC: 34.4 G/DL (ref 31–37)
MCV RBC AUTO: 87.6 FL (ref 80–100)
MONOCYTES # BLD: 12 % (ref 5–9)
MORPHOLOGY: ABNORMAL
NRBC AUTOMATED: ABNORMAL PER 100 WBC
PDW BLD-RTO: 12.5 % (ref 12.1–15.2)
PLATELET # BLD: 180 K/UL (ref 140–450)
PLATELET ESTIMATE: ABNORMAL
PMV BLD AUTO: ABNORMAL FL
POTASSIUM SERPL-SCNC: 4.3 MMOL/L (ref 3.7–5.3)
RBC # BLD: 5.24 M/UL (ref 4.5–5.9)
RBC # BLD: ABNORMAL 10*6/UL
SEG NEUTROPHILS: 71 % (ref 39–75)
SEGMENTED NEUTROPHILS ABSOLUTE COUNT: 5.04 K/UL (ref 2.1–6.5)
SEX HORMONE BINDING GLOBULIN: 30 NMOL/L (ref 11–80)
SODIUM BLD-SCNC: 139 MMOL/L (ref 135–144)
TESTOSTERONE FREE-NONMALE: 96.7 PG/ML (ref 47–244)
TESTOSTERONE TOTAL: 444 NG/DL (ref 220–1000)
TOTAL PROTEIN: 7.1 G/DL (ref 6.4–8.3)
WBC # BLD: 7.1 K/UL (ref 3.5–11)
WBC # BLD: ABNORMAL 10*3/UL

## 2021-11-15 PROCEDURE — 85025 COMPLETE CBC W/AUTO DIFF WBC: CPT

## 2021-11-15 PROCEDURE — 82670 ASSAY OF TOTAL ESTRADIOL: CPT

## 2021-11-15 PROCEDURE — 36415 COLL VENOUS BLD VENIPUNCTURE: CPT

## 2021-11-15 PROCEDURE — 80053 COMPREHEN METABOLIC PANEL: CPT

## 2021-11-15 PROCEDURE — 83002 ASSAY OF GONADOTROPIN (LH): CPT

## 2021-11-15 PROCEDURE — 84403 ASSAY OF TOTAL TESTOSTERONE: CPT

## 2021-11-15 PROCEDURE — 84270 ASSAY OF SEX HORMONE GLOBUL: CPT

## 2021-11-16 ENCOUNTER — PATIENT MESSAGE (OUTPATIENT)
Dept: FAMILY MEDICINE CLINIC | Age: 61
End: 2021-11-16

## 2021-11-16 RX ORDER — TAMSULOSIN HYDROCHLORIDE 0.4 MG/1
0.4 CAPSULE ORAL DAILY
Qty: 90 CAPSULE | Refills: 1 | Status: SHIPPED | OUTPATIENT
Start: 2021-11-16 | End: 2022-05-27 | Stop reason: SDUPTHER

## 2021-11-16 NOTE — TELEPHONE ENCOUNTER
From: Berlin Puga  To: Dr. Mayen Heritage: 11/16/2021 4:18 PM EST  Subject: Nelivan script     Need a new script for the generic Flowmaster, thanks

## 2021-11-17 ENCOUNTER — PATIENT MESSAGE (OUTPATIENT)
Dept: FAMILY MEDICINE CLINIC | Age: 61
End: 2021-11-17

## 2021-11-17 RX ORDER — BUSPIRONE HYDROCHLORIDE 5 MG/1
5 TABLET ORAL 3 TIMES DAILY PRN
Qty: 60 TABLET | Refills: 0 | Status: SHIPPED | OUTPATIENT
Start: 2021-11-17 | End: 2021-12-02 | Stop reason: SDUPTHER

## 2021-11-17 NOTE — TELEPHONE ENCOUNTER
From: Sharon Mullen  To: Dr. Taran Orosco: 11/17/2021 6:44 AM EST  Subject: Anxiety medication     Is there anything else I can try, what I'm taking now just isn't doing it.  Thanks

## 2021-11-17 NOTE — TELEPHONE ENCOUNTER
Agrees to add on buspirone. Send rx to [facility], [address] Scheduled 2 week f/u appointment.

## 2021-12-02 ENCOUNTER — OFFICE VISIT (OUTPATIENT)
Dept: FAMILY MEDICINE CLINIC | Age: 61
End: 2021-12-02
Payer: MEDICARE

## 2021-12-02 VITALS
WEIGHT: 207 LBS | SYSTOLIC BLOOD PRESSURE: 130 MMHG | OXYGEN SATURATION: 96 % | BODY MASS INDEX: 31.47 KG/M2 | DIASTOLIC BLOOD PRESSURE: 70 MMHG | HEART RATE: 90 BPM

## 2021-12-02 DIAGNOSIS — F32.A ANXIETY AND DEPRESSION: Primary | ICD-10-CM

## 2021-12-02 DIAGNOSIS — F41.9 ANXIETY AND DEPRESSION: Primary | ICD-10-CM

## 2021-12-02 DIAGNOSIS — R58 ECCHYMOSIS: ICD-10-CM

## 2021-12-02 PROCEDURE — 99213 OFFICE O/P EST LOW 20 MIN: CPT | Performed by: FAMILY MEDICINE

## 2021-12-02 RX ORDER — SILDENAFIL 100 MG/1
TABLET, FILM COATED ORAL
COMMUNITY
Start: 2021-11-22

## 2021-12-02 RX ORDER — BUSPIRONE HYDROCHLORIDE 10 MG/1
10 TABLET ORAL 3 TIMES DAILY PRN
Qty: 90 TABLET | Refills: 5 | Status: SHIPPED | OUTPATIENT
Start: 2021-12-02 | End: 2022-01-01

## 2021-12-02 ASSESSMENT — ENCOUNTER SYMPTOMS
VOMITING: 0
EYE REDNESS: 0
DIARRHEA: 0
EYE DISCHARGE: 0
COUGH: 0
SHORTNESS OF BREATH: 0

## 2021-12-02 NOTE — PROGRESS NOTES
exho      HPI Notes    Name: Lynda Villalobos  : 1960        Chief Complaint:     Chief Complaint   Patient presents with    Anxiety     21 add Buspar 5mg TID as needed. Pt states Buspar is helping, Pt is taking it TID. History of Present Illness: Lynda Villalobos is a 63 y.o.  male who presents with Anxiety (21 add Buspar 5mg TID as needed. Pt states Buspar is helping, Pt is taking it TID.)      HPI  Anxiety and depression Pt is doing better on the buspar. Pt still on the wellbutrin but the added is helping keeping his anxiety down. Pt states he has never had anxiety until this past year. Pt states he takes the buspar 5mg and then by noon feels anxious so takes another 5mg and at night. So pt taking 5mg TID. Sleeping is fair. Pt is not wanting to take a lot of trazodone so doesn't take it every night. Ecchymosis - 2wks ago pt missed a step and fell onto the steps and banister in his house. Pt still has some Lt upper thigh bruising but getting better. 3d ago pt also missed 2nd to last step coming out of tree stand and fell on buttocks. Pt has no new bleeding from this or pain. Little sore but just wanted to mention this and had picture or his bruise.     Past Medical History:     Past Medical History:   Diagnosis Date    Back pain     chronic back pain    Insomnia     Osteoarthritis     Pneumonia     walking pneumonia    Sleep apnea     Type 2 diabetes mellitus without complication ([facility] [address]       Reviewed all health maintenance requirements and ordered appropriate tests  Health Maintenance Due   Topic Date Due    Hepatitis C screen  Never done    HIV screen  Never done    Hepatitis B vaccine (1 of 3 - Risk 3-dose series) Never done    DTaP/Tdap/Td vaccine (1 - Tdap) Never done    Shingles Vaccine (1 of 2) Never done    COVID-19 Vaccine (2 - Booster for Anita series) 2021    Diabetic retinal exam  2021       Past Surgical History:     Past Surgical History: Procedure Laterality Date    BACK SURGERY  2000   Joslyn Alu CERVICAL FUSION  2007    COLONOSCOPY      COLONOSCOPY  05-    Dr. Shira Gonzalez (Biopsies)    COLONOSCOPY  11/06/2017    DR Nara Flores    KNEE CARTILAGE SURGERY Bilateral     AR COLONOSCOPY W/BIOPSY SINGLE/MULTIPLE N/A 11/6/2017    COLONOSCOPY WITH BIOPSY/ polypectomies performed by Stvee Burks MD at [facility] OR        Medications:       Prior to Admission medications    Medication Sig Start Date End Date Taking? Authorizing Provider   busPIRone (BUSPAR) 10 MG tablet Take 1 tablet by mouth 3 times daily as needed (anxiety) 12/2/21 1/1/22 Yes Thelma Duff MD   sildenafil (VIAGRA) 100 MG tablet Take no more than 1 tablet daily. Take 30 mins before desired sexual activity 11/22/21  Yes Historical Provider, MD   tamsulosin (FLOMAX) 0.4 MG capsule Take 1 capsule by mouth daily 11/16/21  Yes Thelma Duff MD   montelukast (SINGULAIR) 10 MG tablet Take 1 tablet by mouth nightly 11/1/21  Yes Thelma Duff MD   baclofen (LIORESAL) 10 MG tablet 2 tablets po in AM and 2 tablets po in pm 10/6/21  Yes Thelma Duff MD   buPROPion (WELLBUTRIN XL) 300 MG extended release tablet Take 1 tablet by mouth every morning 10/6/21  Yes Thelma Duff MD   fexofenadine-pseudoephedrine (ALLEGRA-D 12HR)  MG per extended release tablet Take 1 tablet by mouth 2 times daily 8/25/21  Yes Thelma Duff MD   traZODone (DESYREL) 50 MG tablet Take 1 tablet by mouth nightly 4/6/21  Yes Thelma Duff MD   Nutritional Supplements (JOINT FORMULA PO) Take by mouth   Yes Historical Provider, MD   Naproxen Sodium (ALEVE) 220 MG CAPS Take 1 tablet by mouth 2 times daily as needed for Pain.    Yes Historical Provider, MD   blood glucose test strips (EXACTECH TEST) strip Pt testing blood sugar one daily and as needed 4/9/19   Thelma Duff MD        Allergies:       Darvocet a500 [propoxyphene n-acetaminophen]    Social History:     Tobacco:    reports that he quit smoking about 13 years ago. His smoking use included cigarettes. He has a 37.50 pack-year smoking history. He quit smokeless tobacco use about 5 years ago. His smokeless tobacco use included chew. Alcohol:      reports no history of alcohol use. Drug Use:  reports no history of drug use. Family History:     Family History   Problem Relation Age of Onset    Other Mother         dementia    Heart Disease Father     Heart Disease Brother        Review of Systems:       Review of Systems   Constitutional: Negative for chills and fever. Eyes: Negative for discharge and redness. Respiratory: Negative for cough and shortness of breath. Cardiovascular: Negative for chest pain and palpitations. Gastrointestinal: Negative for diarrhea and vomiting. Hematological:        Lt upper thigh bruising   Psychiatric/Behavioral: Positive for sleep disturbance. The patient is nervous/anxious. Physical Exam:     Physical Exam  Vitals reviewed. Constitutional:       General: He is not in acute distress. Appearance: Normal appearance. He is not diaphoretic. HENT:      Head: Normocephalic and atraumatic. Eyes:      General:         Right eye: No discharge. Left eye: No discharge. Cardiovascular:      Rate and Rhythm: Normal rate and regular rhythm. Pulses: Normal pulses. Heart sounds: Normal heart sounds. No murmur heard. Pulmonary:      Effort: Pulmonary effort is normal. No respiratory distress. Breath sounds: Normal breath sounds. Musculoskeletal:      Cervical back: Neck supple. Skin:     Findings: Bruising present. Comments: A - lt medial upper thigh with soft ecchymotic region. Neurological:      Mental Status: He is alert. Psychiatric:         Mood and Affect: Mood normal.         Behavior: Behavior normal.         Thought Content:  Thought content normal.         Vitals:  /70   Pulse 90   Wt 207 lb (93.9 kg)   SpO2 96%   BMI 31.47 kg/m²       Data:     Lab Results   Component Value Date     11/15/2021    K 4.3 11/15/2021     11/15/2021    CO2 26 11/15/2021    BUN 11 11/15/2021    CREATININE 0.85 11/15/2021    GLUCOSE 103 11/15/2021    PROT 7.1 11/15/2021    LABALBU 4.3 11/15/2021    BILITOT 0.27 11/15/2021    ALKPHOS 81 11/15/2021    AST 21 11/15/2021    ALT 19 11/15/2021     Lab Results   Component Value Date    WBC 7.1 11/15/2021    RBC 5.24 11/15/2021    HGB 15.8 11/15/2021    HCT 45.9 11/15/2021    MCV 87.6 11/15/2021    MCH 30.2 11/15/2021    MCHC 34.4 11/15/2021    RDW 12.5 11/15/2021     11/15/2021    MPV NOT REPORTED 11/15/2021     Lab Results   Component Value Date    TSH 2.18 10/06/2020     Lab Results   Component Value Date    CHOL 168 03/30/2021    CHOL 188 11/01/2018    HDL 54 03/30/2021    PSA 0.50 11/01/2018    LABA1C 5.6 03/30/2021          Assessment/Plan:        1. Anxiety and depression  Doing better but still more anxiety then he would like. So stay on wellbutrin 300mg and increase   buspar to 10mg one TID PRN and keep April appt    2. Ecchymosis  Resolving and soft to palpate so monitor and taking no ASA      Return has april appt.       Electronically signed by Harjinder See MD on 12/2/2021 at 12:02 PM Use Enhanced Counseling Feature (Automatic): No

## 2021-12-27 ENCOUNTER — OFFICE VISIT (OUTPATIENT)
Dept: FAMILY MEDICINE CLINIC | Age: 61
End: 2021-12-27
Payer: MEDICARE

## 2021-12-27 VITALS
WEIGHT: 207 LBS | HEART RATE: 84 BPM | SYSTOLIC BLOOD PRESSURE: 132 MMHG | DIASTOLIC BLOOD PRESSURE: 84 MMHG | BODY MASS INDEX: 31.47 KG/M2 | OXYGEN SATURATION: 96 % | TEMPERATURE: 97.8 F

## 2021-12-27 DIAGNOSIS — G47.33 OBSTRUCTIVE SLEEP APNEA SYNDROME: ICD-10-CM

## 2021-12-27 DIAGNOSIS — K21.9 GASTROESOPHAGEAL REFLUX DISEASE WITHOUT ESOPHAGITIS: Primary | ICD-10-CM

## 2021-12-27 DIAGNOSIS — F51.01 PRIMARY INSOMNIA: ICD-10-CM

## 2021-12-27 PROCEDURE — 99213 OFFICE O/P EST LOW 20 MIN: CPT | Performed by: NURSE PRACTITIONER

## 2021-12-27 RX ORDER — TRAZODONE HYDROCHLORIDE 100 MG/1
100 TABLET ORAL NIGHTLY
Qty: 30 TABLET | Refills: 2 | Status: SHIPPED | OUTPATIENT
Start: 2021-12-27 | End: 2022-10-28 | Stop reason: SDUPTHER

## 2021-12-27 RX ORDER — PANTOPRAZOLE SODIUM 40 MG/1
40 TABLET, DELAYED RELEASE ORAL
Qty: 30 TABLET | Refills: 2 | Status: SHIPPED | OUTPATIENT
Start: 2021-12-27 | End: 2022-04-06 | Stop reason: SDUPTHER

## 2021-12-27 ASSESSMENT — ENCOUNTER SYMPTOMS
COUGH: 0
GLOBUS SENSATION: 1
SHORTNESS OF BREATH: 0
NAUSEA: 1
VOMITING: 0
DIARRHEA: 0

## 2021-12-27 NOTE — PROGRESS NOTES
HPI Notes    Name: Mark Hunter  : 1960         Chief Complaint:     Chief Complaint   Patient presents with    Nausea & Vomiting     Patient complains of nausea x 1 month. Does not vomit.  Insomnia     Patient complains of difficulty sleeping       History of Present Illness:        Gastroesophageal Reflux  He complains of globus sensation and nausea. He reports no chest pain or no coughing. This is a new problem. The current episode started 1 to 4 weeks ago. The problem occurs frequently. Risk factors include caffeine use, obesity and lack of exercise. He has tried a PPI for the symptoms. Pt has a long standing hx of insomnia. Pt has known sleep apnea that he does not treat. Patient states that he has tried to wear his sleep mask, however he rolls around a lot and the mask comes off or the tubing becomes disconnected. Pt has been taking trazodone, but still waking up.     Past Medical History:     Past Medical History:   Diagnosis Date    Back pain     chronic back pain    Insomnia     Osteoarthritis     Pneumonia     walking pneumonia    Sleep apnea     Type [facility]       Reviewed all health maintenance requirements and ordered appropriate tests  Health Maintenance Due   Topic Date Due    Hepatitis C screen  Never done    HIV screen  Never done    Hepatitis B vaccine (1 of 3 - Risk 3-dose series) Never done    DTaP/Tdap/Td vaccine (1 - Tdap) Never done    Shingles Vaccine (1 of 2) Never done    COVID-19 Vaccine (2 - Booster for Anita series) 2021    Diabetic retinal exam  2021       Past Surgical History:     Past Surgical History:   Procedure Laterality Date    BACK SURGERY      CERVICAL FUSION      COLONOSCOPY      COLONOSCOPY  2014    Dr. Shira Gonzalez (Biopsies)    COLONOSCOPY  2017    DR Nara Flores    KNEE CARTILAGE SURGERY Bilateral     [place] COLONOSCOPY W/BIOPSY SINGLE/MULTIPLE N/A 2017    COLONOSCOPY WITH BIOPSY/ polypectomies performed by Kristin Marie MD at [facility] OR        Medications:       Prior to Admission medications    Medication Sig Start Date End Date Taking? Authorizing Provider   traZODone (DESYREL) 100 MG tablet Take 1 tablet by mouth nightly 12/27/21  Yes LYDIA Greenberg CNP   busPIRone (BUSPAR) 10 MG tablet Take 1 tablet by mouth 3 times daily as needed (anxiety) 12/2/21 1/1/22 Yes Gianna Tejeda MD   sildenafil (VIAGRA) 100 MG tablet Take no more than 1 tablet daily. Take 30 mins before desired sexual activity 11/22/21  Yes Historical Provider, MD   tamsulosin (FLOMAX) 0.4 MG capsule Take 1 capsule by mouth daily 11/16/21  Yes Gianna Tejeda MD   montelukast (SINGULAIR) 10 MG tablet Take 1 tablet by mouth nightly 11/1/21  Yes Gianna Tejeda MD   baclofen (LIORESAL) 10 MG tablet 2 tablets po in AM and 2 tablets po in pm 10/6/21  Yes Gianna Tejeda MD   buPROPion (WELLBUTRIN XL) 300 MG extended release tablet Take 1 tablet by mouth every morning 10/6/21  Yes Gianna Tejeda MD   fexofenadine-pseudoephedrine (ALLEGRA-D 12HR)  MG per extended release tablet Take 1 tablet by mouth 2 times daily 8/25/21  Yes Gianna Tejeda MD   blood glucose test strips (EXACTECH TEST) strip Pt testing blood sugar one daily and as needed 4/9/19  Yes Gianna Tejeda MD   Nutritional Supplements (JOINT FORMULA PO) Take by mouth   Yes Historical Provider, MD   Naproxen Sodium (ALEVE) 220 MG CAPS Take 1 tablet by mouth 2 times daily as needed for Pain. Yes Historical Provider, MD        Allergies:       Darvocet a500 [propoxyphene n-acetaminophen]    Social History:     Tobacco:    reports that he quit smoking about 13 years ago. His smoking use included cigarettes. He has a 37.50 pack-year smoking history. He quit smokeless tobacco use about 5 years ago. His smokeless tobacco use included chew. Alcohol:      reports no history of alcohol use.   Drug Use:  reports no history of drug use.    Family History:        Family History   Problem Relation Age of Onset    Other Mother         dementia    Heart Disease Father     Heart Disease Brother        Review of Systems:         Review of Systems   Constitutional: Negative for chills and fever. Respiratory: Negative for cough and shortness of breath. Cardiovascular: Negative for chest pain and palpitations. Gastrointestinal: Positive for nausea. Negative for diarrhea and vomiting. Neurological: Negative for dizziness, seizures and headaches. Physical Exam:     Vitals:  /84   Pulse 84   Temp 97.8 °F (36.6 °C) (Oral)   Wt 207 lb (93.9 kg)   SpO2 96%   BMI 31.47 kg/m²       Physical Exam  Vitals and nursing note reviewed. Constitutional:       Appearance: He is well-developed. Cardiovascular:      Rate and Rhythm: Normal rate and regular rhythm. Heart sounds: S1 normal and S2 normal.   Pulmonary:      Effort: Pulmonary effort is normal. No respiratory distress. Breath sounds: Normal breath sounds. Abdominal:      General: Bowel sounds are normal.      Palpations: Abdomen is soft. Tenderness: There is no abdominal tenderness. Skin:     General: Skin is warm and dry. Psychiatric:         Behavior: Behavior is cooperative.                Data:     Lab Results   Component Value Date     11/15/2021    K 4.3 11/15/2021     11/15/2021    CO2 26 11/15/2021    BUN 11 11/15/2021    CREATININE 0.85 11/15/2021    GLUCOSE 103 11/15/2021    PROT 7.1 11/15/2021    LABALBU 4.3 11/15/2021    BILITOT 0.27 11/15/2021    ALKPHOS 81 11/15/2021    AST 21 11/15/2021    ALT 19 11/15/2021     Lab Results   Component Value Date    WBC 7.1 11/15/2021    RBC 5.24 11/15/2021    HGB 15.8 11/15/2021    HCT 45.9 11/15/2021    MCV 87.6 11/15/2021    MCH 30.2 11/15/2021    MCHC 34.4 11/15/2021    RDW 12.5 11/15/2021     11/15/2021    MPV NOT REPORTED 11/15/2021     Lab Results   Component Value Date    TSH 2.18 10/06/2020     Lab Results   Component Value Date    CHOL 168 03/30/2021    CHOL 188 11/01/2018    HDL 54 03/30/2021    PSA 0.50 11/01/2018    LABA1C 5.6 03/30/2021          Assessment & Plan        Diagnosis Orders   1. Gastroesophageal reflux disease without esophagitis  --Symptoms consistent with reflux. Patient had taken his spouse's Protonix for 3 days and did get relief from symptoms. Will start patient on Protonix 40 mg p.o. daily. Patient educated about diet modification    2. Obstructive sleep apnea syndrome  --pt advised to get new mask and continue trying to be compliant. 3. Primary insomnia   --will increase trazodone to 100 mg p.o. nightly. Patient educated that this will help him sleep, however if we do not fix the underlying problem (sleep apnea) then no matter medication will help him sleep effectively. Patient verbalizes understanding and agreement with plan. All questions answered. If symptoms do not resolve or worsen, return to office. Completed Refills   Requested Prescriptions     Signed Prescriptions Disp Refills    traZODone (DESYREL) 100 MG tablet 30 tablet 2     Sig: Take 1 tablet by mouth nightly     No follow-ups on file. Orders Placed This Encounter   Medications    traZODone (DESYREL) 100 MG tablet     Sig: Take 1 tablet by mouth nightly     Dispense:  30 tablet     Refill:  2     No orders of the defined types were placed in this encounter. Patient Instructions   SURVEY:    You may be receiving a survey from [employer] regarding your visit today. Please complete the survey to enable us to provide the highest quality of care to you and your family. If you cannot score us a very good (5 Stars) on any question, please call the office to discuss how we could have made your experience a very good one. Thank you.     Clinical Care Team: LYDIA Herrera-ANA Giraldo LPN    Clerical Team: Radha Smith Evelyn Sena        Electronically signed by LYDIA Dunlap CNP on 12/27/2021 at 8:37 AM           Completed Refills      Requested Prescriptions     Signed Prescriptions Disp Refills    traZODone (DESYREL) 100 MG tablet 30 tablet 2     Sig: Take 1 tablet by mouth nightly         Yovanny received counseling on the following healthy behaviors: nutrition, exercise and medication adherence  Reviewed prior labs and health maintenance. Continue current medications, diet and exercise. Discussed use, benefit, and side effects of prescribed medications. Barriers to medication compliance addressed. Patient given educational materials - see patient instructions. All patient questions answered. Patient voiced understanding.

## 2021-12-27 NOTE — PATIENT INSTRUCTIONS
SURVEY:    You may be receiving a survey from [employer] regarding your visit today. Please complete the survey to enable us to provide the highest quality of care to you and your family. If you cannot score us a very good (5 Stars) on any question, please call the office to discuss how we could have made your experience a very good one. Thank you.     Clinical Care Team: FLORY Herrera LPN    Clerical Team: [address] [place] [address]

## 2022-02-24 ENCOUNTER — PATIENT MESSAGE (OUTPATIENT)
Dept: FAMILY MEDICINE CLINIC | Age: 62
End: 2022-02-24

## 2022-02-24 RX ORDER — FEXOFENADINE HCL AND PSEUDOEPHEDRINE HCI 60; 120 MG/1; MG/1
1 TABLET, EXTENDED RELEASE ORAL 2 TIMES DAILY
Qty: 60 TABLET | Refills: 5 | Status: SHIPPED | OUTPATIENT
Start: 2022-02-24 | End: 2022-09-04 | Stop reason: SDUPTHER

## 2022-02-24 NOTE — TELEPHONE ENCOUNTER
From: Jasbir Noel  To: Dr. Beverly Vazquez: 2/24/2022 3:59 PM EST  Subject: Allegrs-d    I need a new script for the generic brand of Allegra-d.  Thanks

## 2022-03-01 ENCOUNTER — PATIENT MESSAGE (OUTPATIENT)
Dept: FAMILY MEDICINE CLINIC | Age: 62
End: 2022-03-01

## 2022-03-01 NOTE — TELEPHONE ENCOUNTER
From: Nathaly Feng  To: Dr. Asuncion Raza: 3/1/2022 6:56 AM EST  Subject: Handicap place card    I need a written script to mail in to renew my 2 handicap place cards.  Thanks

## 2022-03-01 NOTE — LETTER
[facility]  [address] [place] [id number]  Phone: [phone]  Fax: Karen Lentz MD         March 1, 2022     Patient: Brisa Browne   YOB: 1960   Date of Visit: 3/1/2022       To Whom It May Concern: It is my medical opinion that Nayana Pedro requires a disability parking placard for the following reasons:  He has limited walking ability due to an orthopedic condition. Duration of need: 5 years    If you have any questions or concerns, please don't hesitate to call.     Sincerely,        Christine Elam MD

## 2022-03-21 RX ORDER — MONTELUKAST SODIUM 10 MG/1
10 TABLET ORAL NIGHTLY
Qty: 30 TABLET | Refills: 0 | Status: SHIPPED | OUTPATIENT
Start: 2022-03-21 | End: 2022-04-22 | Stop reason: SDUPTHER

## 2022-03-21 NOTE — TELEPHONE ENCOUNTER
Last OV: 12/27/2021 chronic  Last RX:    Next scheduled apt: 4/6/2022 6 month         surescript requesting a refill

## 2022-04-06 ENCOUNTER — OFFICE VISIT (OUTPATIENT)
Dept: FAMILY MEDICINE CLINIC | Age: 62
End: 2022-04-06
Payer: MEDICARE

## 2022-04-06 VITALS
DIASTOLIC BLOOD PRESSURE: 86 MMHG | BODY MASS INDEX: 30.66 KG/M2 | HEART RATE: 72 BPM | SYSTOLIC BLOOD PRESSURE: 130 MMHG | HEIGHT: 69 IN | WEIGHT: 207 LBS | OXYGEN SATURATION: 95 %

## 2022-04-06 DIAGNOSIS — K21.9 GASTROESOPHAGEAL REFLUX DISEASE WITHOUT ESOPHAGITIS: ICD-10-CM

## 2022-04-06 DIAGNOSIS — M25.551 HIP PAIN, RIGHT: ICD-10-CM

## 2022-04-06 DIAGNOSIS — N40.0 BENIGN PROSTATIC HYPERPLASIA WITHOUT LOWER URINARY TRACT SYMPTOMS: ICD-10-CM

## 2022-04-06 DIAGNOSIS — G47.33 OBSTRUCTIVE SLEEP APNEA SYNDROME: ICD-10-CM

## 2022-04-06 DIAGNOSIS — B00.1 RECURRENT COLD SORES: ICD-10-CM

## 2022-04-06 DIAGNOSIS — E11.9 TYPE 2 DIABETES MELLITUS WITHOUT COMPLICATION, WITHOUT LONG-TERM CURRENT USE OF INSULIN (HCC): Primary | ICD-10-CM

## 2022-04-06 DIAGNOSIS — F33.1 MODERATE EPISODE OF RECURRENT MAJOR DEPRESSIVE DISORDER (HCC): ICD-10-CM

## 2022-04-06 LAB
CREATININE URINE POCT: 200
HBA1C MFR BLD: 6.5 %
MICROALBUMIN/CREAT 24H UR: 10 MG/G{CREAT}
MICROALBUMIN/CREAT UR-RTO: <30

## 2022-04-06 PROCEDURE — 83036 HEMOGLOBIN GLYCOSYLATED A1C: CPT | Performed by: FAMILY MEDICINE

## 2022-04-06 PROCEDURE — 3044F HG A1C LEVEL LT 7.0%: CPT | Performed by: FAMILY MEDICINE

## 2022-04-06 PROCEDURE — 99214 OFFICE O/P EST MOD 30 MIN: CPT | Performed by: FAMILY MEDICINE

## 2022-04-06 PROCEDURE — 82044 UR ALBUMIN SEMIQUANTITATIVE: CPT | Performed by: FAMILY MEDICINE

## 2022-04-06 RX ORDER — VALACYCLOVIR HYDROCHLORIDE 1 G/1
2000 TABLET, FILM COATED ORAL 2 TIMES DAILY
Qty: 8 TABLET | Refills: 2 | Status: SHIPPED | OUTPATIENT
Start: 2022-04-06 | End: 2022-04-08

## 2022-04-06 RX ORDER — BUSPIRONE HYDROCHLORIDE 10 MG/1
TABLET ORAL
COMMUNITY
Start: 2022-03-25

## 2022-04-06 RX ORDER — PANTOPRAZOLE SODIUM 40 MG/1
40 TABLET, DELAYED RELEASE ORAL
Qty: 90 TABLET | Refills: 1 | Status: SHIPPED | OUTPATIENT
Start: 2022-04-06 | End: 2022-10-19 | Stop reason: SDUPTHER

## 2022-04-06 ASSESSMENT — ENCOUNTER SYMPTOMS
GLOBUS SENSATION: 0
HEARTBURN: 1
VOMITING: 0
EYE DISCHARGE: 0
FACIAL SWELLING: 0
SORE THROAT: 0
COUGH: 0
ROS SKIN COMMENTS: COLD SORES
NAUSEA: 0
EYE PAIN: 0

## 2022-04-06 ASSESSMENT — PATIENT HEALTH QUESTIONNAIRE - PHQ9
2. FEELING DOWN, DEPRESSED OR HOPELESS: 1
SUM OF ALL RESPONSES TO PHQ QUESTIONS 1-9: 2
1. LITTLE INTEREST OR PLEASURE IN DOING THINGS: 1
SUM OF ALL RESPONSES TO PHQ QUESTIONS 1-9: 2
SUM OF ALL RESPONSES TO PHQ9 QUESTIONS 1 & 2: 2

## 2022-04-06 NOTE — PATIENT INSTRUCTIONS
Survey: You may be receiving a survey from [employer] regarding your visit today. You may get this in the mail, through your MyChart or in your email. Please complete the survey to enable us to provide the highest quality of care to you and your family. Please also, mention our names. If you cannot score us as very good (5 Stars) on any question, please feel free to call the office to discuss how we could have made your experience exceptional.      Thank You!         MD Carly Rasmussen LPN

## 2022-04-06 NOTE — PROGRESS NOTES
HPI Notes    Name: Migdalia Gilliland  : 1960        Chief Complaint:     Chief Complaint   Patient presents with    Gastroesophageal Reflux     6 month check up    Benign Prostatic Hypertrophy     Follows with urology.  Sleep Apnea     Is not wearing cpap d/t ill fitting mask    Arthritis    Diabetes       History of Present Illness: Migdalia Gilliland is a 63 y.o.  male who presents with Gastroesophageal Reflux (6 month check up), Benign Prostatic Hypertrophy (Follows with urology. ), Sleep Apnea (Is not wearing cpap d/t ill fitting mask), Arthritis, and Diabetes      Gastroesophageal Reflux  He complains of heartburn. He reports no chest pain, no coughing, no dysphagia, no globus sensation, no nausea or no sore throat. This is a chronic problem. The current episode started more than 1 year ago. The problem has been gradually improving (pt feels his heartburn is better and depends on what he eats. ). The symptoms are aggravated by certain foods. Pertinent negatives include no melena or weight loss. He has tried a PPI for the symptoms. The treatment provided significant relief. Benign Prostatic Hypertrophy  This is a chronic problem. The current episode started more than 1 year ago. The problem is unchanged. Irritative symptoms do not include frequency or urgency. Pertinent negatives include no chills, dysuria, hematuria, nausea or vomiting. Past treatments include tamsulosin. The treatment provided significant relief. Diabetes  He presents for his follow-up diabetic visit. He has type 2 diabetes mellitus. There are no hypoglycemic associated symptoms. Pertinent negatives for hypoglycemia include no dizziness. Pertinent negatives for diabetes include no chest pain and no weight loss. There are no hypoglycemic complications. Risk factors for coronary artery disease include diabetes mellitus. Current diabetic treatment includes diet. He is compliant with treatment most of the time.  His weight is increasing steadily. He is following a generally healthy diet. Hip Pain   The injury mechanism was a fall (pt states he fell out of the tree stand onto more the Rt hip. Pt states intial pain from the fall is better but still off and on. ). The pain is present in the right hip. The quality of the pain is described as aching. The pain has been intermittent (overall the pain is better. ) since onset. Pertinent negatives include no loss of motion, numbness or tingling. The symptoms are aggravated by movement. He has tried rest, ice and heat (Pt states it is better if he rest more but that hard to do as busy working. ) for the symptoms. The treatment provided moderate relief. Depression - chronic but stable and pt feels he is doing well on the Wellbutrin 300mg. Pt is feeling good and ready for spring to get out and get working outside more. Pt also taking trazodone to help her sleep well. Sleep apnea - chronic but stable but pt never did find a mask that fits so not wearing the CPAP for awhile     Cold sores - chronic and pt states he tends to get them more in the spring and wondering what he could take for this.     Past Medical History:     Past Medical History:   Diagnosis Date    Back pain     chronic back pain    Insomnia     Osteoarthritis     Pneumonia     walking pneumonia    Sleep apnea     Type 2 diabetes mellitus without complication ([facility]       Reviewed all health maintenance requirements and ordered appropriate tests  Health Maintenance Due   Topic Date Due    Hepatitis C screen  Never done    HIV screen  Never done    DTaP/Tdap/Td vaccine (1 - Tdap) Never done    Shingles Vaccine (1 of 2) Never done    Diabetic retinal exam  05/18/2021    A1C test (Diabetic or Prediabetic)  03/30/2022    Diabetic microalbuminuria test  03/30/2022    Lipid screen  03/30/2022    Annual Wellness Visit (AWV)  04/07/2022       Past Surgical History:     Past Surgical History:   Procedure Laterality Date    BACK SURGERY  2000    CERVICAL FUSION  2007    COLONOSCOPY      COLONOSCOPY  05-    Dr. Darnell Lanes (Biopsies)    COLONOSCOPY  11/06/2017    DR Artem Dubon    KNEE CARTILAGE SURGERY Bilateral     [place] COL[place] W/BIOPSY SINGLE/MULTIPLE N/A 11/6/2017    COLONOSCOPY WITH BIOPSY/ polypectomies performed by [facility]a Garg [facility] at [facility] OR        Medications:       Prior to Admission medications    Medication Sig Start Date End Date Taking? Authorizing Provider   montelukast (SINGULAIR) 10 MG tablet Take 1 tablet by mouth nightly 3/21/22  Yes Chaya Romero MD   fexofenadine-pseudoephedrine (ALLEGRA-D 12HR)  MG per extended release tablet Take 1 tablet by mouth 2 times daily 2/24/22  Yes Chaya Romero MD   traZODone (DESYREL) 100 MG tablet Take 1 tablet by mouth nightly 12/27/21  Yes LYDIA Tinoco CNP   pantoprazole (PROTONIX) 40 MG tablet Take 1 tablet by mouth every morning (before breakfast) 12/27/21  Yes LYDIA Tinoco CNP   tamsulosin (FLOMAX) 0.4 MG capsule Take 1 capsule by mouth daily 11/16/21  Yes Chaya Romero MD   baclofen (LIORESAL) 10 MG tablet 2 tablets po in AM and 2 tablets po in pm 10/6/21  Yes Chaya Romero MD   buPROPion (WELLBUTRIN XL) 300 MG extended release tablet Take 1 tablet by mouth every morning 10/6/21  Yes Chaya Romero MD   Nutritional Supplements (JOINT FORMULA PO) Take by mouth   Yes Historical Provider, MD   busPIRone (BUSPAR) 10 MG tablet TAKE 1 TABLET BY MOUTH THREE TIMES DAILY AS NEEDED FOR ANXIETY 3/25/22   Historical Provider, MD   clomiPHENE (CLOMID) 50 MG tablet  3/21/22   Historical Provider, MD   sildenafil (VIAGRA) 100 MG tablet Take no more than 1 tablet daily.   Take 30 mins before desired sexual activity 11/22/21   Historical Provider, MD   blood glucose test strips (EXACTECH TEST) strip Pt testing blood sugar one daily and as needed 4/9/19   Chaya Romero MD   Naproxen Sodium (ALEVE) 220 MG CAPS Take 1 tablet by mouth 2 times daily as needed for Pain. Historical Provider, MD        Allergies:       Darvocet a500 [propoxyphene n-acetaminophen]    Social History:     Tobacco:    reports that he quit smoking about 13 years ago. His smoking use included cigarettes. He has a 37.50 pack-year smoking history. He quit smokeless tobacco use about 6 years ago. His smokeless tobacco use included chew. Alcohol:      reports no history of alcohol use. Drug Use:  reports no history of drug use. Family History:     Family History   Problem Relation Age of Onset    Other Mother         dementia    Heart Disease Father     Heart Disease Brother        Review of Systems:       Review of Systems   Constitutional: Negative for chills, fever and weight loss. HENT: Negative for facial swelling and sore throat. Eyes: Negative for pain and discharge. Respiratory: Negative for cough. Cardiovascular: Negative for chest pain. Gastrointestinal: Positive for heartburn. Negative for dysphagia, melena, nausea and vomiting. Genitourinary: Negative for dysuria, frequency, hematuria and urgency. Skin: Negative for rash. Cold sores   Neurological: Negative for dizziness, tingling, facial asymmetry and numbness. Psychiatric/Behavioral: Negative for dysphoric mood and self-injury. Physical Exam:     Physical Exam  Vitals reviewed. Constitutional:       Appearance: He is well-developed. HENT:      Head: Normocephalic and atraumatic. Eyes:      General:         Right eye: No discharge. Left eye: No discharge. Conjunctiva/sclera: Conjunctivae normal.   Neck:      Thyroid: No thyromegaly. Cardiovascular:      Rate and Rhythm: Normal rate and regular rhythm. Heart sounds: No murmur heard. Pulmonary:      Effort: Pulmonary effort is normal.      Breath sounds: Normal breath sounds. Abdominal:      General: Bowel sounds are normal.      Palpations: Abdomen is soft. Tenderness:  There is no abdominal tenderness. Musculoskeletal:      Cervical back: Neck supple. Skin:     General: Skin is warm and dry. Findings: No rash. Neurological:      Mental Status: He is alert and oriented to person, place, and time. Vitals:  /86 (Site: Right Upper Arm, Position: Sitting, Cuff Size: Large Adult)   Pulse 72   Ht 5' 9\" (1.753 m)   Wt 207 lb (93.9 kg)   SpO2 95%   BMI 30.57 kg/m²       Data:     Lab Results   Component Value Date     11/15/2021    K 4.3 11/15/2021     11/15/2021    CO2 26 11/15/2021    BUN 11 11/15/2021    CREATININE 0.85 11/15/2021    GLUCOSE 103 11/15/2021    PROT 7.1 11/15/2021    LABALBU 4.3 11/15/2021    BILITOT 0.27 11/15/2021    ALKPHOS 81 11/15/2021    AST 21 11/15/2021    ALT 19 11/15/2021     Lab Results   Component Value Date    WBC 7.1 11/15/2021    RBC 5.24 11/15/2021    HGB 15.8 11/15/2021    HCT 45.9 11/15/2021    MCV 87.6 11/15/2021    MCH 30.2 11/15/2021    MCHC 34.4 11/15/2021    RDW 12.5 11/15/2021     11/15/2021    MPV NOT REPORTED 11/15/2021     Lab Results   Component Value Date    TSH 2.18 10/06/2020     Lab Results   Component Value Date    CHOL 168 03/30/2021    CHOL 188 11/01/2018    HDL 54 03/30/2021    PSA 0.50 11/01/2018    LABA1C 5.6 03/30/2021          Assessment/Plan:        1. Gastroesophageal reflux disease without esophagitis  Stable on protonix     2. Benign prostatic hyperplasia without lower urinary tract symptoms  Stable on flomax and follows with urology     3. Type 2 diabetes mellitus without complication, without long-term current use of insulin (HCC)  F/U 6mos, in office , HgbA1C. Do daily foot checks and yearly eye exams  hgba1c up to 6.5 so pt to improve DM diet and get back to walking   - POCT glycosylated hemoglobin (Hb A1C)  - Lipid Panel; Future    4. Hip pain, right  Improving and suggested PT but pt feels better so will keep stretching and walking     5.  Moderate episode of recurrent major depressive disorder ([facility]  Doing well on the wellbutrin     6. Obstructive sleep apnea syndrome  Stable but not wearing his CPAP    7. Recurrent cold sores  Try PRN         Yovanny received counseling on the following healthy behaviors: nutrition and exercise  Reviewed prior labs and health maintenance  Continue current medications, diet and exercise. Discussed use, benefit, and side effects of prescribed medications. Barriers to medication compliance addressed. Patient given educational materials - see patient instructions  Was a self-tracking handout given in paper form or via [employer]? Yes    Requested Prescriptions     Signed Prescriptions Disp Refills    pantoprazole (PROTONIX) 40 MG tablet 90 tablet 1     Sig: Take 1 tablet by mouth every morning (before breakfast)       All patient questions answered. Patient voiced understanding. Quality Measures    Body mass index is 30.57 kg/m². Elevated. Weight control planned discussed Healthy diet and regular exercise. BP: 130/86 Blood pressure is normal. Treatment plan consists of No treatment change needed. Lab Results   Component Value Date    [address] 117 11/01/2018    LDLCHOLESTEROL 102 03/30/2021    (goal LDL reduction with dx if diabetes is 50% LDL reduction)      PHQ Scores 8/26/2021 4/6/2021 4/6/2020 5/1/2019 4/9/2019 9/5/2018 1/23/2017   PHQ2 Score 5 0 0 1 0 2 2   PHQ9 Score 20 0 0 4 0 2 2     Interpretation of Total Score Depression Severity: 1-4 = Minimal depression, 5-9 = Mild depression, 10-14 = Moderate depression, 15-19 = Moderately severe depression, 20-27 = Severe depression      No follow-ups on file.       Electronically signed by Elton Bauer MD on 4/6/2022 at 7:11 AM

## 2022-05-17 ENCOUNTER — PATIENT MESSAGE (OUTPATIENT)
Dept: FAMILY MEDICINE CLINIC | Age: 62
End: 2022-05-17

## 2022-05-17 DIAGNOSIS — R25.2 LEG CRAMPS: ICD-10-CM

## 2022-05-18 ENCOUNTER — HOSPITAL ENCOUNTER (OUTPATIENT)
Age: 62
Discharge: HOME | End: 2022-05-18
Payer: MEDICARE

## 2022-05-18 LAB
ABSOLUTE EOS #: 0.1 K/UL (ref 0–0.4)
ABSOLUTE LYMPH #: 1.4 K/UL (ref 1–4.8)
ABSOLUTE MONO #: 0.6 K/UL (ref 0–1)
ALBUMIN SERPL-MCNC: 4.4 G/DL (ref 3.5–5.2)
ALP BLD-CCNC: 87 U/L (ref 40–129)
ALT SERPL-CCNC: 63 U/L (ref 5–41)
ANION GAP SERPL CALCULATED.3IONS-SCNC: 12 MMOL/L (ref 9–17)
AST SERPL-CCNC: 52 U/L
BASOPHILS # BLD: 1 % (ref 0–2)
BASOPHILS ABSOLUTE: 0 K/UL (ref 0–0.2)
BILIRUB SERPL-MCNC: 0.53 MG/DL (ref 0.3–1.2)
BUN BLDV-MCNC: 22 MG/DL (ref 8–23)
BUN/CREAT BLD: 26 (ref 9–20)
CALCIUM SERPL-MCNC: 9 MG/DL (ref 8.6–10.4)
CHLORIDE BLD-SCNC: 105 MMOL/L (ref 98–107)
CO2: 25 MMOL/L (ref 20–31)
CREAT SERPL-MCNC: 0.86 MG/DL (ref 0.7–1.2)
DIFFERENTIAL TYPE: YES
EOSINOPHILS RELATIVE PERCENT: 1 % (ref 0–5)
ESTRADIOL LEVEL: 38.7 PG/ML (ref 27–52)
GFR AFRICAN AMERICAN: >60 ML/MIN
GFR NON-AFRICAN AMERICAN: >60 ML/MIN
GFR SERPL CREATININE-BSD FRML MDRD: ABNORMAL ML/MIN/{1.73_M2}
GLUCOSE BLD-MCNC: 132 MG/DL (ref 70–99)
HCT VFR BLD CALC: 44.1 % (ref 41–53)
HEMOGLOBIN: 14.8 G/DL (ref 13.5–17.5)
LYMPHOCYTES # BLD: 26 % (ref 13–44)
MCH RBC QN AUTO: 28.8 PG (ref 26–34)
MCHC RBC AUTO-ENTMCNC: 33.4 G/DL (ref 31–37)
MCV RBC AUTO: 86.2 FL (ref 80–100)
MONOCYTES # BLD: 11 % (ref 5–9)
PDW BLD-RTO: 13.2 % (ref 12.1–15.2)
PLATELET # BLD: 156 K/UL (ref 140–450)
POTASSIUM SERPL-SCNC: 4.1 MMOL/L (ref 3.7–5.3)
PROSTATE SPECIFIC ANTIGEN: 0.76 NG/ML
RBC # BLD: 5.12 M/UL (ref 4.5–5.9)
SEG NEUTROPHILS: 61 % (ref 39–75)
SEGMENTED NEUTROPHILS ABSOLUTE COUNT: 3.3 K/UL (ref 2.1–6.5)
SEX HORMONE BINDING GLOBULIN: 37 NMOL/L (ref 11–80)
SODIUM BLD-SCNC: 142 MMOL/L (ref 135–144)
TESTOSTERONE FREE-NONMALE: 63.8 PG/ML (ref 47–244)
TESTOSTERONE TOTAL: 343 NG/DL (ref 220–1000)
TOTAL PROTEIN: 7.5 G/DL (ref 6.4–8.3)
WBC # BLD: 5.4 K/UL (ref 3.5–11)

## 2022-05-18 PROCEDURE — 36415 COLL VENOUS BLD VENIPUNCTURE: CPT

## 2022-05-18 PROCEDURE — 82670 ASSAY OF TOTAL ESTRADIOL: CPT

## 2022-05-18 PROCEDURE — 85025 COMPLETE CBC W/AUTO DIFF WBC: CPT

## 2022-05-18 PROCEDURE — G0103 PSA SCREENING: HCPCS

## 2022-05-18 PROCEDURE — 84270 ASSAY OF SEX HORMONE GLOBUL: CPT

## 2022-05-18 PROCEDURE — 80053 COMPREHEN METABOLIC PANEL: CPT

## 2022-05-18 PROCEDURE — 84403 ASSAY OF TOTAL TESTOSTERONE: CPT

## 2022-05-18 RX ORDER — BACLOFEN 10 MG/1
TABLET ORAL
Qty: 360 TABLET | Refills: 1 | Status: SHIPPED | OUTPATIENT
Start: 2022-05-18

## 2022-05-18 NOTE — TELEPHONE ENCOUNTER
Last visit:  4/6/2022  Next Visit Date:    Future Appointments   Date Time Provider Kelsie Sheetsi   10/5/2022  7:00 AM MD Akash Bautista [facility]         Medication List:  Prior to Admission medications    Medication Sig Start Date End Date Taking? Authorizing Provider   montelukast (SINGULAIR) 10 MG tablet Take 1 tablet by mouth nightly 4/22/22   Edda Frye MD   busPIRone (BUSPAR) 10 MG tablet TAKE 1 TABLET BY MOUTH THREE TIMES DAILY AS NEEDED FOR ANXIETY 3/25/22   Historical Provider, MD   clomiPHENE (CLOMID) 50 MG tablet  3/21/22   Historical Provider, MD   pantoprazole (PROTONIX) 40 MG tablet Take 1 tablet by mouth every morning (before breakfast) 4/6/22   Edda Frye MD   fexofenadine-pseudoephedrine (ALLEGRA-D 12HR)  MG per extended release tablet Take 1 tablet by mouth 2 times daily 2/24/22   Edda Frye MD   traZODone (DESYREL) 100 MG tablet Take 1 tablet by mouth nightly 12/27/21   LYDIA Durán - CNP   sildenafil (VIAGRA) 100 MG tablet Take no more than 1 tablet daily. Take 30 mins before desired sexual activity 11/22/21   Historical Provider, MD   tamsulosin (FLOMAX) 0.4 MG capsule Take 1 capsule by mouth daily 11/16/21   Edda Frye MD   baclofen (LIORESAL) 10 MG tablet 2 tablets po in AM and 2 tablets po in pm 10/6/21   Edda Frye MD   buPROPion (WELLBUTRIN XL) 300 MG extended release tablet Take 1 tablet by mouth every morning 10/6/21   Edda Frye MD   blood glucose test strips (EXACTECH TEST) strip Pt testing blood sugar one daily and as needed 4/9/19   Edda Frye MD   Nutritional Supplements (JOINT FORMULA PO) Take by mouth    Historical Provider, MD   Naproxen Sodium (ALEVE) 220 MG CAPS Take 1 tablet by mouth 2 times daily as needed for Pain.     Historical Provider, MD

## 2022-05-18 NOTE — TELEPHONE ENCOUNTER
From: Spring Maldonado  To: Dr. Navin Hendricks: 5/17/2022 7:00 PM EDT  Subject: Olivier Henley     I need a new script for Baclofin.  Thanks

## 2022-05-27 RX ORDER — TAMSULOSIN HYDROCHLORIDE 0.4 MG/1
0.4 CAPSULE ORAL DAILY
Qty: 90 CAPSULE | Refills: 0 | Status: SHIPPED | OUTPATIENT
Start: 2022-05-27 | End: 2022-08-25 | Stop reason: SDUPTHER

## 2022-05-27 NOTE — TELEPHONE ENCOUNTER
Pt is leaving for vacation tonight and is out of medication. Can you please send in flomax to [facility].  Pt is aware you are off on fridays but we will try to get it to the pharm before he leaves

## 2022-05-27 NOTE — TELEPHONE ENCOUNTER
I am not sure if Dr. Arthur Perez is in town , can you please sign ogg on this medication . Rosa Maria Suarez  Thank you

## 2022-08-25 RX ORDER — BUPROPION HYDROCHLORIDE 300 MG/1
300 TABLET ORAL EVERY MORNING
Qty: 90 TABLET | Refills: 1 | Status: SHIPPED | OUTPATIENT
Start: 2022-08-25

## 2022-08-25 NOTE — TELEPHONE ENCOUNTER
Last OV: 4/6/2022 dm  Last RX:  Next scheduled apt: 10/5/2022      Sure scripts request      RX pending

## 2022-08-26 ENCOUNTER — TELEPHONE (OUTPATIENT)
Dept: FAMILY MEDICINE CLINIC | Age: 62
End: 2022-08-26

## 2022-08-26 RX ORDER — TAMSULOSIN HYDROCHLORIDE 0.4 MG/1
0.4 CAPSULE ORAL DAILY
Qty: 90 CAPSULE | Refills: 0 | Status: SHIPPED | OUTPATIENT
Start: 2022-08-26

## 2022-08-26 RX ORDER — MONTELUKAST SODIUM 10 MG/1
10 TABLET ORAL NIGHTLY
Qty: 30 TABLET | Refills: 3 | Status: SHIPPED | OUTPATIENT
Start: 2022-08-26

## 2022-08-26 RX ORDER — TAMSULOSIN HYDROCHLORIDE 0.4 MG/1
CAPSULE ORAL
Qty: 90 CAPSULE | Refills: 0 | OUTPATIENT
Start: 2022-08-26

## 2022-08-26 NOTE — TELEPHONE ENCOUNTER
Pt stated the pharmacy didn't have the Flomax Rx called [facility] spoke with Jorge Duran the RX was there but not filled yet.  Advised the pt

## 2022-08-26 NOTE — TELEPHONE ENCOUNTER
Last visit:  4/6/2022  Next Visit Date:    Future Appointments   Date Time Provider Kelsie Collado   10/5/2022  7:00 AM Debra Delacruz MD [facility]         Medication List:  Prior to Admission medications    Medication Sig Start Date End Date Taking? Authorizing Provider   buPROPion (WELLBUTRIN XL) 300 MG extended release tablet Take 1 tablet by mouth every morning 8/25/22   Debra Delacruz MD   tamsulosin [facility]) 0.4 mg capsule Take 1 capsule by mouth daily 5/27/22   LYDIA Regalado CNP   baclofen (LIORESAL) 10 MG tablet 2 tablets po in AM and 2 tablets po in pm 5/18/22   Debra Delacruz MD   montelukast (SINGULAIR) 10 MG tablet Take 1 tablet by mouth nightly 4/22/22   Debra Delacruz MD   busPIRone (BUSPAR) 10 MG tablet TAKE 1 TABLET BY MOUTH THREE TIMES DAILY AS NEEDED FOR ANXIETY 3/25/22   Historical Provider, MD   clomiPHENE (CLOMID) 50 MG tablet  3/21/22   Historical Provider, MD   pantoprazole (PROTONIX) 40 MG tablet Take 1 tablet by mouth every morning (before breakfast) 4/6/22   Debra Delacruz MD   fexofenadine-pseudoephedrine (ALLEGRA-D 12HR)  MG per extended release tablet Take 1 tablet by mouth 2 times daily 2/24/22   Debra Delacruz MD   traZODone (DESYREL) 100 MG tablet Take 1 tablet by mouth nightly 12/27/21   LYDIA Regalado CNP   sildenafil (VIAGRA) 100 MG tablet Take no more than 1 tablet daily. Take 30 mins before desired sexual activity 11/22/21   Historical Provider, MD   blood glucose test strips (EXACTECH TEST) strip Pt testing blood sugar one daily and as needed 4/9/19   Debra Delacruz MD   Nutritional Supplements (JOINT FORMULA PO) Take by mouth    Historical Provider, MD   Naproxen Sodium (ALEVE) 220 MG CAPS Take 1 tablet by mouth 2 times daily as needed for Pain.     Historical Provider, MD

## 2022-08-26 NOTE — TELEPHONE ENCOUNTER
Last visit:  4/6/2022  Next Visit Date:    Future Appointments   Date Time Provider Kelsie Collado   10/5/2022  7:00 AM Jorge Galan MD [facility]         Medication List:  Prior to Admission medications    Medication Sig Start Date End Date Taking? Authorizing Provider   buPROPion (WELLBUTRIN XL) 300 MG extended release tablet Take 1 tablet by mouth every morning 8/25/22   Jorge Galan MD   tamsulosin [facility]) 0.4 mg capsule Take 1 capsule by mouth daily 5/27/22   LYDIA Lowery CNP   baclofen (LIORESAL) 10 MG tablet 2 tablets po in AM and 2 tablets po in pm 5/18/22   Jorge Galan MD   montelukast (SINGULAIR) 10 MG tablet Take 1 tablet by mouth nightly 4/22/22   Jorge Galan MD   busPIRone (BUSPAR) 10 MG tablet TAKE 1 TABLET BY MOUTH THREE TIMES DAILY AS NEEDED FOR ANXIETY 3/25/22   Historical Provider, MD   clomiPHENE (CLOMID) 50 MG tablet  3/21/22   Historical Provider, MD   pantoprazole (PROTONIX) 40 MG tablet Take 1 tablet by mouth every morning (before breakfast) 4/6/22   Jorge Galan MD   fexofenadine-pseudoephedrine (ALLEGRA-D 12HR)  MG per extended release tablet Take 1 tablet by mouth 2 times daily 2/24/22   Jorge Galan MD   traZODone (DESYREL) 100 MG tablet Take 1 tablet by mouth nightly 12/27/21   LYDIA Lowery CNP   sildenafil (VIAGRA) 100 MG tablet Take no more than 1 tablet daily. Take 30 mins before desired sexual activity 11/22/21   Historical Provider, MD   blood glucose test strips (EXACTECH TEST) strip Pt testing blood sugar one daily and as needed 4/9/19   Jorge Galan MD   Nutritional Supplements (JOINT FORMULA PO) Take by mouth    Historical Provider, MD   Naproxen Sodium (ALEVE) 220 MG CAPS Take 1 tablet by mouth 2 times daily as needed for Pain.     Historical Provider, MD

## 2022-09-06 RX ORDER — FEXOFENADINE HCL AND PSEUDOEPHEDRINE HCI 60; 120 MG/1; MG/1
1 TABLET, EXTENDED RELEASE ORAL 2 TIMES DAILY
Qty: 60 TABLET | Refills: 5 | Status: SHIPPED | OUTPATIENT
Start: 2022-09-06

## 2022-09-27 ENCOUNTER — HOSPITAL ENCOUNTER (OUTPATIENT)
Age: 62
Discharge: HOME | End: 2022-09-27
Payer: MEDICARE

## 2022-09-27 DIAGNOSIS — E11.9 TYPE 2 DIABETES MELLITUS WITHOUT COMPLICATION, WITHOUT LONG-TERM CURRENT USE OF INSULIN (HCC): ICD-10-CM

## 2022-09-27 LAB
ABSOLUTE EOS #: 0.08 K/UL (ref 0–0.44)
ABSOLUTE IMMATURE GRANULOCYTE: <0.03 K/UL (ref 0–0.3)
ABSOLUTE LYMPH #: 1.51 K/UL (ref 1.1–3.7)
ABSOLUTE MONO #: 0.76 K/UL (ref 0.1–1.2)
ALBUMIN SERPL-MCNC: 4.4 G/DL (ref 3.5–5.2)
ALBUMIN/GLOBULIN RATIO: 1.6 (ref 1–2.5)
ALP BLD-CCNC: 71 U/L (ref 40–129)
ALT SERPL-CCNC: 24 U/L (ref 5–41)
ANION GAP SERPL CALCULATED.3IONS-SCNC: 12 MMOL/L (ref 9–17)
AST SERPL-CCNC: 26 U/L
BASOPHILS # BLD: 1 % (ref 0–2)
BASOPHILS ABSOLUTE: 0.06 K/UL (ref 0–0.2)
BILIRUB SERPL-MCNC: 0.4 MG/DL (ref 0.3–1.2)
BUN BLDV-MCNC: 20 MG/DL (ref 8–23)
BUN/CREAT BLD: 21 (ref 9–20)
CALCIUM SERPL-MCNC: 9.2 MG/DL (ref 8.6–10.4)
CHLORIDE BLD-SCNC: 102 MMOL/L (ref 98–107)
CHOLESTEROL/HDL RATIO: 3.5
CHOLESTEROL: 140 MG/DL
CO2: 27 MMOL/L (ref 20–31)
CREAT SERPL-MCNC: 0.94 MG/DL (ref 0.7–1.2)
EOSINOPHILS RELATIVE PERCENT: 1 % (ref 1–4)
ESTRADIOL LEVEL: 47.4 PG/ML (ref 27–52)
GFR AFRICAN AMERICAN: >60 ML/MIN
GFR NON-AFRICAN AMERICAN: >60 ML/MIN
GFR SERPL CREATININE-BSD FRML MDRD: ABNORMAL ML/MIN/{1.73_M2}
GFR SERPL CREATININE-BSD FRML MDRD: ABNORMAL ML/MIN/{1.73_M2}
GLUCOSE BLD-MCNC: 126 MG/DL (ref 70–99)
HCT VFR BLD CALC: 44.6 % (ref 40.7–50.3)
HDLC SERPL-MCNC: 40 MG/DL
HEMOGLOBIN: 14.7 G/DL (ref 13–17)
IMMATURE GRANULOCYTES: 0 %
LDL CHOLESTEROL: 84 MG/DL (ref 0–130)
LYMPHOCYTES # BLD: 24 % (ref 24–43)
MCH RBC QN AUTO: 30.2 PG (ref 25.2–33.5)
MCHC RBC AUTO-ENTMCNC: 33 G/DL (ref 28.4–34.8)
MCV RBC AUTO: 91.8 FL (ref 82.6–102.9)
MONOCYTES # BLD: 12 % (ref 3–12)
NRBC AUTOMATED: 0 PER 100 WBC
PDW BLD-RTO: 13.2 % (ref 11.8–14.4)
PLATELET # BLD: 173 K/UL (ref 138–453)
PMV BLD AUTO: 12.3 FL (ref 8.1–13.5)
POTASSIUM SERPL-SCNC: 4 MMOL/L (ref 3.7–5.3)
RBC # BLD: 4.86 M/UL (ref 4.21–5.77)
SEG NEUTROPHILS: 62 % (ref 36–65)
SEGMENTED NEUTROPHILS ABSOLUTE COUNT: 3.98 K/UL (ref 1.5–8.1)
SEX HORMONE BINDING GLOBULIN: 37 NMOL/L (ref 11–80)
SODIUM BLD-SCNC: 141 MMOL/L (ref 135–144)
TESTOSTERONE FREE-NONMALE: 85.9 PG/ML (ref 47–244)
TESTOSTERONE TOTAL: 446 NG/DL (ref 220–1000)
TOTAL PROTEIN: 7.1 G/DL (ref 6.4–8.3)
TRIGL SERPL-MCNC: 78 MG/DL
WBC # BLD: 6.4 K/UL (ref 3.5–11.3)

## 2022-09-27 PROCEDURE — 84403 ASSAY OF TOTAL TESTOSTERONE: CPT

## 2022-09-27 PROCEDURE — 80053 COMPREHEN METABOLIC PANEL: CPT

## 2022-09-27 PROCEDURE — 84270 ASSAY OF SEX HORMONE GLOBUL: CPT

## 2022-09-27 PROCEDURE — 85025 COMPLETE CBC W/AUTO DIFF WBC: CPT

## 2022-09-27 PROCEDURE — 80061 LIPID PANEL: CPT

## 2022-09-27 PROCEDURE — 36415 COLL VENOUS BLD VENIPUNCTURE: CPT

## 2022-09-27 PROCEDURE — 82670 ASSAY OF TOTAL ESTRADIOL: CPT

## 2022-10-04 ENCOUNTER — TELEPHONE (OUTPATIENT)
Dept: ONCOLOGY | Age: 62
End: 2022-10-04

## 2022-10-04 DIAGNOSIS — Z87.891 PERSONAL HISTORY OF NICOTINE DEPENDENCE: Primary | ICD-10-CM

## 2022-10-04 NOTE — TELEPHONE ENCOUNTER
Our records indicate that your patient is coming due for their annual lung cancer screening follow up testing. For your convenience, we have pended the order for the scan for you. If you do not agree with the need for the test, please cancel the order and let us know. Sincerely,    [address] [place] [address] Screening Program    Auto printed reminder letter sent to patient.

## 2022-10-04 NOTE — TELEPHONE ENCOUNTER
Please call this pt and see if he is ok getting the ANNUAL lung cancer screening CT -- I recommend and then if so send back for me to sign.

## 2022-10-05 ENCOUNTER — OFFICE VISIT (OUTPATIENT)
Dept: FAMILY MEDICINE CLINIC | Age: 62
End: 2022-10-05
Payer: MEDICARE

## 2022-10-05 ENCOUNTER — HOSPITAL ENCOUNTER (OUTPATIENT)
Dept: GENERAL RADIOLOGY | Age: 62
Discharge: HOME | End: 2022-10-07
Payer: MEDICARE

## 2022-10-05 ENCOUNTER — HOSPITAL ENCOUNTER (OUTPATIENT)
Age: 62
Discharge: HOME | End: 2022-10-07
Payer: MEDICARE

## 2022-10-05 VITALS
HEART RATE: 84 BPM | SYSTOLIC BLOOD PRESSURE: 126 MMHG | WEIGHT: 204 LBS | HEIGHT: 69 IN | BODY MASS INDEX: 30.21 KG/M2 | DIASTOLIC BLOOD PRESSURE: 78 MMHG

## 2022-10-05 DIAGNOSIS — N40.0 BENIGN PROSTATIC HYPERPLASIA WITHOUT LOWER URINARY TRACT SYMPTOMS: ICD-10-CM

## 2022-10-05 DIAGNOSIS — M54.50 CHRONIC BILATERAL LOW BACK PAIN WITHOUT SCIATICA: ICD-10-CM

## 2022-10-05 DIAGNOSIS — K21.9 GASTROESOPHAGEAL REFLUX DISEASE WITHOUT ESOPHAGITIS: ICD-10-CM

## 2022-10-05 DIAGNOSIS — Z23 NEED FOR INFLUENZA VACCINATION: ICD-10-CM

## 2022-10-05 DIAGNOSIS — J30.2 SEASONAL ALLERGIES: ICD-10-CM

## 2022-10-05 DIAGNOSIS — F32.A ANXIETY AND DEPRESSION: ICD-10-CM

## 2022-10-05 DIAGNOSIS — R22.2 MASS OF CHEST WALL, RIGHT: ICD-10-CM

## 2022-10-05 DIAGNOSIS — G89.29 CHRONIC BILATERAL LOW BACK PAIN WITHOUT SCIATICA: ICD-10-CM

## 2022-10-05 DIAGNOSIS — G47.39 OTHER SLEEP APNEA: ICD-10-CM

## 2022-10-05 DIAGNOSIS — F41.9 ANXIETY AND DEPRESSION: ICD-10-CM

## 2022-10-05 DIAGNOSIS — E11.9 TYPE 2 DIABETES MELLITUS WITHOUT COMPLICATION, WITHOUT LONG-TERM CURRENT USE OF INSULIN (HCC): Primary | ICD-10-CM

## 2022-10-05 LAB — HBA1C MFR BLD: 6.3 %

## 2022-10-05 PROCEDURE — 72110 X-RAY EXAM L-2 SPINE 4/>VWS: CPT

## 2022-10-05 PROCEDURE — 96372 THER/PROPH/DIAG INJ SC/IM: CPT | Performed by: FAMILY MEDICINE

## 2022-10-05 PROCEDURE — G0008 ADMIN INFLUENZA VIRUS VAC: HCPCS | Performed by: FAMILY MEDICINE

## 2022-10-05 PROCEDURE — 3044F HG A1C LEVEL LT 7.0%: CPT | Performed by: FAMILY MEDICINE

## 2022-10-05 PROCEDURE — 99215 OFFICE O/P EST HI 40 MIN: CPT | Performed by: FAMILY MEDICINE

## 2022-10-05 PROCEDURE — 90674 CCIIV4 VAC NO PRSV 0.5 ML IM: CPT | Performed by: FAMILY MEDICINE

## 2022-10-05 PROCEDURE — 83036 HEMOGLOBIN GLYCOSYLATED A1C: CPT | Performed by: FAMILY MEDICINE

## 2022-10-05 RX ORDER — TRIAMCINOLONE ACETONIDE 40 MG/ML
40 INJECTION, SUSPENSION INTRA-ARTICULAR; INTRAMUSCULAR ONCE
Status: COMPLETED | OUTPATIENT
Start: 2022-10-05 | End: 2022-10-05

## 2022-10-05 RX ADMIN — TRIAMCINOLONE ACETONIDE 40 MG: 40 INJECTION, SUSPENSION INTRA-ARTICULAR; INTRAMUSCULAR at 08:03

## 2022-10-05 SDOH — ECONOMIC STABILITY: FOOD INSECURITY: WITHIN THE PAST 12 MONTHS, THE FOOD YOU BOUGHT JUST DIDN'T LAST AND YOU DIDN'T HAVE MONEY TO GET MORE.: NEVER TRUE

## 2022-10-05 SDOH — ECONOMIC STABILITY: FOOD INSECURITY: WITHIN THE PAST 12 MONTHS, YOU WORRIED THAT YOUR FOOD WOULD RUN OUT BEFORE YOU GOT MONEY TO BUY MORE.: NEVER TRUE

## 2022-10-05 ASSESSMENT — ENCOUNTER SYMPTOMS
COUGH: 0
SORE THROAT: 0
BACK PAIN: 1
VOMITING: 0
EYE ITCHING: 0
NAUSEA: 0
BOWEL INCONTINENCE: 0
WHEEZING: 0
HEARTBURN: 0
CHOKING: 0

## 2022-10-05 ASSESSMENT — SOCIAL DETERMINANTS OF HEALTH (SDOH): HOW HARD IS IT FOR YOU TO PAY FOR THE VERY BASICS LIKE FOOD, HOUSING, MEDICAL CARE, AND HEATING?: NOT VERY HARD

## 2022-10-05 NOTE — PROGRESS NOTES
HPI Notes    Name: Hilton Morris  : 1960        Chief Complaint:     Chief Complaint   Patient presents with    Diabetes     6 month check up. Gastroesophageal Reflux    Mental Health Problem    Benign Prostatic Hypertrophy    Sleep Apnea     Insomnia worsening, sleeps 3-4 hrs per night. Has been unable to wear cpap. Discuss Inspire Sleep procedure. Allergies     Started on Singulair at last visit. Discuss increasing dose. Nodule     Raised tender to touch nodule left chest area. History of Present Illness: Hilton Morris is a 63 y.o.  male who presents with Diabetes (6 month check up. ), Gastroesophageal Reflux, Mental Health Problem, Benign Prostatic Hypertrophy, Sleep Apnea (Insomnia worsening, sleeps 3-4 hrs per night. Has been unable to wear cpap. Discuss Inspire Sleep procedure. ), Allergies (Started on Singulair at last visit. Discuss increasing dose. ), and Nodule (Raised tender to touch nodule left chest area. )      Diabetes  He presents for his follow-up diabetic visit. He has type 2 diabetes mellitus. Hypoglycemia symptoms include nervousness/anxiousness. Pertinent negatives for hypoglycemia include no dizziness. There are no diabetic associated symptoms. Pertinent negatives for diabetes include no chest pain and no weight loss. There are no hypoglycemic complications. There are no diabetic complications. Risk factors for coronary artery disease include diabetes mellitus and male sex. Current diabetic treatment includes diet. He is compliant with treatment most of the time. His weight is stable. He is following a generally healthy diet. His home blood glucose trend is decreasing steadily (hgba1c 6.3). An ACE inhibitor/angiotensin II receptor blocker is being taken. He does not see a podiatrist.Eye exam is current. Gastroesophageal Reflux  He reports no chest pain, no choking, no coughing, no dysphagia, no heartburn, no nausea, no sore throat or no wheezing.  pt states he feels down or anxious almost every day. . This is a chronic problem. The current episode started more than 1 year ago. The problem has been unchanged. Pertinent negatives include no melena or weight loss. He has tried a PPI for the symptoms. The treatment provided significant relief. Mental Health Problem  The primary symptoms include dysphoric mood. The primary symptoms do not include delusions or hallucinations. Primary symptoms comment: pt states he feels down or anxious almost every day. . The current episode started more than 1 month ago. This is a chronic problem. The onset of the illness is precipitated by emotional stress (pt just can't fall asleep most nights and is taking trazodone. Pt takes wellbutrin and buspar too and not helping. Pt has contacted his insurance and is calling today to go see counselor too.). The degree of incapacity that he is experiencing as a consequence of his illness is moderate. Additional symptoms of the illness include insomnia. He does not admit to suicidal ideas. He does not have a plan to attempt suicide. He does not contemplate harming himself. He has not already injured self. He does not contemplate injuring another person. He has not already  injured another person. Benign Prostatic Hypertrophy  This is a chronic problem. The current episode started more than 1 year ago. The problem is unchanged. Irritative symptoms do not include frequency or urgency. Obstructive symptoms do not include a slower stream or a weak stream. Pertinent negatives include no chills, nausea or vomiting. Past treatments include tamsulosin. The treatment provided significant relief. Allergies  Presents for follow-up visit. He reports no congestion, cough, ear pain, eye itching, fever, itchy nose, plugged ear sensation, rash, sore throat or wheezing. (pt states he feels down or anxious almost every day.) The problem occurs daily. Timing of symptoms is in the morning.  Symptom severity has been moderate. The treatment provided moderate (pt takes singulair every night and Allegra D BID and flonase and still bothered by constant post nasal drip.) relief. There are no compliance problems. Back Pain  This is a recurrent problem. Episode onset: in the past 6mos pt's low back pain has been more painful and occ goes down his legs--- \"once in while\". The problem occurs daily. The problem is unchanged. The pain is present in the lumbar spine. The quality of the pain is described as aching (deep ache \"like in the bone\"). Radiates to: occ the pain goes down one leg or the other and so pt has not been walking as much. The pain is moderate. The symptoms are aggravated by standing and bending. Associated symptoms include leg pain. Pertinent negatives include no bladder incontinence, bowel incontinence, chest pain, fever, paresis, paresthesias, tingling or weight loss. He has tried ice and heat (support belt and in 2000 pt had back surgery then. pt takes alleve.) for the symptoms. The treatment provided mild relief. Lt lump in chest - pt has noticed that he has a lump in the Lt chest for about 5mos. Pt states only tender if you push on it. No redness. No drainage. Pt has no injury that he recalls.      Sleep apnea - chronic but pt can't wear the mask and is looking to some new things out there like Inspire  Past Medical History:     Past Medical History:   Diagnosis Date    Back pain     chronic back pain    Insomnia     Osteoarthritis     Pneumonia     walking pneumonia    Sleep apnea     Type 2 diabetes mellitus without complication ([facility] [address]       Reviewed all health maintenance requirements and ordered appropriate tests  Health Maintenance Due   Topic Date Due    HIV screen  Never done    Hepatitis C screen  Never done    DTaP/Tdap/Td vaccine (1 - Tdap) Never done    Shingles vaccine (1 of 2) Never done    Annual Wellness Visit (AWV)  04/07/2022    COVID-19 Vaccine (3 - Booster for [employer] series) 04/30/2022    Colorectal Cancer Screen  11/06/2022       Past Surgical History:     Past Surgical History:   Procedure Laterality Date    BACK SURGERY  2000    CERVICAL FUSION  2007    COLONOSCOPY      COLONOSCOPY  05-    Dr. Anthony Wakefield (Biopsies)    COLONOSCOPY  11/06/2017    DR Louann Whaley    KNEE CARTILAGE SURGERY Bilateral     UT COLONOSCOPY W/BIOPSY SINGLE/MULTIPLE N/A 11/6/2017    COLONOSCOPY WITH BIOPSY/ polypectomies performed by Chacha Agee MD at [facility] OR        Medications:       Prior to Admission medications    Medication Sig Start Date End Date Taking? Authorizing Provider   fexofenadine-pseudoephedrine (ALLEGRA-D 12HR)  MG per extended release tablet Take 1 tablet by mouth 2 times daily 9/6/22  Yes Danny Ruggiero MD   tamsulosin [facility]) 0.4 MG capsule Take 1 capsule by mouth daily 8/26/22  Yes Danny Ruggiero MD   montelukast (SINGULAIR) 10 MG tablet Take 1 tablet by mouth nightly 8/26/22  Yes Danny Ruggiero MD   buPROPion (WELLBUTRIN XL) 300 MG extended release tablet Take 1 tablet by mouth every morning 8/25/22  Yes Danny Ruggiero MD   baclofen (LIORESAL) 10 MG tablet 2 tablets po in AM and 2 tablets po in pm 5/18/22  Yes Danny Ruggiero MD   busPIRone (BUSPAR) 10 MG tablet TAKE 1 TABLET BY MOUTH THREE TIMES DAILY AS NEEDED FOR ANXIETY 3/25/22  Yes Historical Provider, MD   clomiPHENE (CLOMID) 50 MG tablet  3/21/22  Yes Historical Provider, MD   pantoprazole (PROTONIX) 40 MG tablet Take 1 tablet by mouth every morning (before breakfast) 4/6/22  Yes Danny Ruggiero MD   traZODone (DESYREL) 100 MG tablet Take 1 tablet by mouth nightly 12/27/21  Yes Manisha Flakes, APRN - CNP   Nutritional Supplements (JOINT FORMULA PO) Take by mouth   Yes Historical Provider, MD   sildenafil (VIAGRA) 100 MG tablet Take no more than 1 tablet daily.   Take 30 mins before desired sexual activity 11/22/21   Historical Provider, MD   blood glucose test strips (EXACTECH TEST) strip Pt testing blood sugar one daily and as needed 4/9/19   Clementine Ray MD   Naproxen Sodium (ALEVE) 220 MG CAPS Take 1 tablet by mouth 2 times daily as needed for Pain. Historical Provider, MD        Allergies:       Darvocet a500 [propoxyphene n-acetaminophen]    Social History:     Tobacco:    reports that he quit smoking about 14 years ago. His smoking use included cigarettes. He has a 37.50 pack-year smoking history. He quit smokeless tobacco use about 6 years ago. His smokeless tobacco use included chew. Alcohol:      reports no history of alcohol use. Drug Use:  reports no history of drug use. Family History:     Family History   Problem Relation Age of Onset    Other Mother         dementia    Heart Disease Father     Heart Disease Brother        Review of Systems:       Review of Systems   Constitutional:  Negative for chills, fever and weight loss. HENT:  Negative for congestion, ear pain and sore throat. Post nasal drainage   Eyes:  Negative for itching. Respiratory:  Negative for cough, choking and wheezing. Lt chest wall mass   Cardiovascular:  Negative for chest pain, palpitations and leg swelling. Gastrointestinal:  Negative for blood in stool, bowel incontinence, constipation, diarrhea, dysphagia, heartburn, melena, nausea and vomiting. Genitourinary:  Negative for bladder incontinence, difficulty urinating, frequency and urgency. Musculoskeletal:  Positive for back pain. Skin:  Negative for rash. Neurological:  Negative for dizziness, tingling, facial asymmetry, light-headedness and paresthesias. Psychiatric/Behavioral:  Positive for dysphoric mood. Negative for hallucinations. The patient is nervous/anxious and has insomnia. Physical Exam:     Physical Exam  Vitals reviewed. Constitutional:       General: He is not in acute distress. Appearance: Normal appearance. He is well-developed. He is not ill-appearing. HENT:      Head: Normocephalic and atraumatic. Mouth/Throat:      Comments: Post nasal drainage-- clear  Eyes:      Conjunctiva/sclera: Conjunctivae normal.   Neck:      Thyroid: No thyromegaly. Vascular: No carotid bruit. Cardiovascular:      Rate and Rhythm: Normal rate and regular rhythm. Heart sounds: Normal heart sounds. No murmur heard. Pulmonary:      Effort: Pulmonary effort is normal. No respiratory distress. Breath sounds: Normal breath sounds. Chest:          Comments: A -Lt chest wall at about noon -- above nipple with <1cm soft and movable mass, non tender. Abdominal:      General: Bowel sounds are normal.      Palpations: Abdomen is soft. Tenderness: There is no abdominal tenderness. Musculoskeletal:      Cervical back: Neck supple. Lumbar back: Tenderness present. No swelling, deformity or bony tenderness. Back:       Comments: A - Rt lower back tenderness to palpate   Lymphadenopathy:      Cervical: No cervical adenopathy. Skin:     General: Skin is warm and dry. Findings: No rash. Neurological:      Mental Status: He is alert and oriented to person, place, and time. Psychiatric:         Attention and Perception: Attention normal.         Mood and Affect: Mood is anxious. Affect is not blunt, flat or tearful. Speech: Speech normal.         Behavior: Behavior normal. Behavior is cooperative. Thought Content:  Thought content normal.       Vitals:  /78   Pulse 84   Ht 5' 9\" (1.753 m)   Wt 204 lb (92.5 kg)   BMI 30.13 kg/m²       Data:     Lab Results   Component Value Date/Time     09/27/2022 07:06 AM    K 4.0 09/27/2022 07:06 AM     09/27/2022 07:06 AM    CO2 27 09/27/2022 07:06 AM    BUN 20 09/27/2022 07:06 AM    CREATININE 0.94 09/27/2022 07:06 AM    GLUCOSE 126 09/27/2022 07:06 AM    PROT 7.1 09/27/2022 07:06 AM    LABALBU 4.4 09/27/2022 07:06 AM    BILITOT 0.4 09/27/2022 07:06 AM    ALKPHOS 71 09/27/2022 07:06 AM    AST 26 09/27/2022 07:06 AM    ALT 24 09/27/2022 07:06 AM     Lab Results   Component Value Date/Time    WBC 6.4 09/27/2022 07:06 AM    RBC 4.86 09/27/2022 07:06 AM    HGB 14.7 09/27/2022 07:06 AM    HCT 44.6 09/27/2022 07:06 AM    MCV 91.8 09/27/2022 07:06 AM    MCH 30.2 09/27/2022 07:06 AM    MCHC 33.0 09/27/2022 07:06 AM    RDW 13.2 09/27/2022 07:06 AM     09/27/2022 07:06 AM    MPV 12.3 09/27/2022 07:06 AM     Lab Results   Component Value Date/Time    TSH 2.18 10/06/2020 08:55 AM     Lab Results   Component Value Date/Time    CHOL 140 09/27/2022 07:09 AM    CHOL 188 11/01/2018 12:00 AM    HDL 40 09/27/2022 07:09 AM    PSA 0.76 05/18/2022 07:09 AM    LABA1C 6.3 10/05/2022 07:06 AM          Assessment/Plan:        1. Type 2 diabetes mellitus without complication, without long-term current use of insulin ([facility])  F/U 6mos, in office  HgbA1C. Do daily foot checks and yearly eye exams  Stable on DM diet control and exercise but went up a little since has not been able to exercise much with his back pain. - POCT glycosylated hemoglobin (Hb A1C)  -  DIABETES FOOT EXAM    2. Gastroesophageal reflux disease without esophagitis  Stable on protonix    3. Anxiety and depression  Pt will continue on Wellbutrin and buspar at current doses but really recommend pt goes to counseling --- pt states he has numbers for a place in [place] [employer] and he will call today. No suicide thoughts and pt will make erasto appt in 1mos with me to see how he is feeling and hopefully in counseling. May need to see psychiatrist too   Pt taking trazodone for sleep but also not sleeping well so will talk to counseling   4. Benign prostatic hyperplasia without lower urinary tract symptoms  Stable on flomax    5. Seasonal allergies  Pt to stay on singulair , and allegra D but see if a kenalog 40mg helps and erasto in 1mos to see if better  - triamcinolone acetonide (KENALOG-40) injection 40 mg    6.  Chronic bilateral low back pain without sciatica  D/w pt this increase in his chronic back pain so will get xray today and then have pt go to PT and erasto in 1mos -- if not better then suggest MRI   Pt to stay on baclofen for back   - [facility] - Zay Celaya  - XR LUMBAR SPINE (MIN 4 VIEWS); Future    7. Mass of chest wall, right  Recommend soft tissue u/s of area. - US HEAD NECK SOFT TISSUE THYROID; Future    8. Other sleep apnea  Pt does not were CPAP as can't tolerate mask pt asking about some new non mask option that I am not familiar with so pt told to research and call to see if anyone in [place] has these non mask options     9. Need for influenza vaccination  Shot given   - Influenza, FLUCELVAX, (age 10 mo+), IM, Preservative Free, 0.5 mL    FF 45min with pt talking about chronic and new concerns    Yovanny received counseling on the following healthy behaviors: nutrition and exercise  Reviewed prior labs and health maintenance  Continue current medications, diet and exercise. Discussed use, benefit, and side effects of prescribed medications. Barriers to medication compliance addressed. Patient given educational materials - see patient instructions  Was a self-tracking handout given in paper form or via [employer]? Yes    Requested Prescriptions      No prescriptions requested or ordered in this encounter       All patient questions answered. Patient voiced understanding. Quality Measures    Body mass index is 30.13 kg/m². Elevated. Weight control planned discussed Healthy diet and regular exercise. BP: 126/78 Blood pressure is Normal. Treatment plan consists of No treatment change needed.     Lab Results   Component Value Date    LDLCALC 117 11/01/2018    LDLCHOLESTEROL 84 09/27/2022    (goal LDL reduction with dx if diabetes is 50% LDL reduction)      PHQ Scores 4/6/2022 8/26/2021 4/6/2021 4/6/2020 5/1/2019 4/9/2019 9/5/2018   PHQ2 Score 2 5 0 0 1 0 2   PHQ9 Score 2 20 0 0 4 0 2     Interpretation of Total Score Depression Severity: 1-4 = Minimal depression, 5-9 = Mild depression, 10-14 = Moderate depression, 15-19 = Moderately severe depression, 20-27 = Severe depression      Return in about 4 weeks (around 11/2/2022) for back pain.       Electronically signed by Joel Menendez MD on 10/8/2022 at 9:09 AM

## 2022-10-05 NOTE — PATIENT INSTRUCTIONS
Survey: You may be receiving a survey from [employer] regarding your visit today. You may get this in the mail, through your MyChart or in your email. Please complete the survey to enable us to provide the highest quality of care to you and your family. Please also, mention our names. If you cannot score us as very good (5 Stars) on any question, please feel free to call the office to discuss how we could have made your experience exceptional.      Thank You!         MD Ashley Carroll LPN

## 2022-10-05 NOTE — PROGRESS NOTES
Vaccine Information Sheet, \"Influenza - Inactivated\"  given to Mateusz Yañez, or parent/legal guardian of  Mateusz Yañez and verbalized understanding. Patient responses:    Have you ever had a reaction to a flu vaccine? No  Are you able to eat eggs without adverse effects? Yes  Do you have any current illness? No  Have you ever had Guillian Thoreau Syndrome? No    Flu vaccine given per order. Please see immunization tab.

## 2022-10-08 ASSESSMENT — ENCOUNTER SYMPTOMS
BLOOD IN STOOL: 0
CONSTIPATION: 0
DIARRHEA: 0

## 2022-10-11 ENCOUNTER — HOSPITAL ENCOUNTER (OUTPATIENT)
Dept: ULTRASOUND IMAGING | Age: 62
Discharge: HOME | End: 2022-10-13
Payer: MEDICARE

## 2022-10-11 ENCOUNTER — TELEPHONE (OUTPATIENT)
Dept: FAMILY MEDICINE CLINIC | Age: 62
End: 2022-10-11

## 2022-10-11 DIAGNOSIS — R22.2 MASS OF CHEST WALL, LEFT: ICD-10-CM

## 2022-10-11 DIAGNOSIS — R22.2 MASS OF CHEST WALL, RIGHT: ICD-10-CM

## 2022-10-11 DIAGNOSIS — R22.2 MASS OF CHEST WALL, LEFT: Primary | ICD-10-CM

## 2022-10-11 PROCEDURE — 76604 US EXAM CHEST: CPT

## 2022-10-19 RX ORDER — PANTOPRAZOLE SODIUM 40 MG/1
40 TABLET, DELAYED RELEASE ORAL
Qty: 90 TABLET | Refills: 1 | Status: SHIPPED | OUTPATIENT
Start: 2022-10-19

## 2022-10-19 NOTE — TELEPHONE ENCOUNTER
Last OV: 10/5/2022  chronic   Last RX:    Next scheduled apt: 11/4/2022    back pain          Pt requesting a refill

## 2022-10-31 RX ORDER — TRAZODONE HYDROCHLORIDE 100 MG/1
100 TABLET ORAL NIGHTLY
Qty: 30 TABLET | Refills: 5 | Status: SHIPPED | OUTPATIENT
Start: 2022-10-31

## 2022-11-04 ENCOUNTER — HOSPITAL ENCOUNTER (OUTPATIENT)
Dept: CT IMAGING | Age: 62
Discharge: HOME | End: 2022-11-06
Payer: MEDICARE

## 2022-11-04 DIAGNOSIS — Z87.891 PERSONAL HISTORY OF NICOTINE DEPENDENCE: ICD-10-CM

## 2022-11-04 PROCEDURE — 71271 CT THORAX LUNG CANCER SCR C-: CPT

## 2022-11-07 ENCOUNTER — OFFICE VISIT (OUTPATIENT)
Dept: FAMILY MEDICINE CLINIC | Age: 62
End: 2022-11-07
Payer: MEDICARE

## 2022-11-07 VITALS — SYSTOLIC BLOOD PRESSURE: 116 MMHG | OXYGEN SATURATION: 98 % | HEART RATE: 74 BPM | DIASTOLIC BLOOD PRESSURE: 70 MMHG

## 2022-11-07 DIAGNOSIS — G89.29 CHRONIC BILATERAL LOW BACK PAIN WITHOUT SCIATICA: Primary | ICD-10-CM

## 2022-11-07 DIAGNOSIS — M79.604 PAIN IN BOTH LOWER EXTREMITIES: ICD-10-CM

## 2022-11-07 DIAGNOSIS — M79.605 PAIN IN BOTH LOWER EXTREMITIES: ICD-10-CM

## 2022-11-07 DIAGNOSIS — M54.50 CHRONIC BILATERAL LOW BACK PAIN WITHOUT SCIATICA: Primary | ICD-10-CM

## 2022-11-07 PROCEDURE — 99213 OFFICE O/P EST LOW 20 MIN: CPT | Performed by: FAMILY MEDICINE

## 2022-11-07 ASSESSMENT — ENCOUNTER SYMPTOMS
BACK PAIN: 1
BOWEL INCONTINENCE: 0

## 2022-11-07 NOTE — PROGRESS NOTES
HPI Notes    Name: Reji Chappell  : 1960        Chief Complaint:     Chief Complaint   Patient presents with    Back Pain     Pt presents today for follow up. 10/5/22 X- Ray lumbar spine - Arhtritis L5 - S1. If pain not improved after PT will order MRI. Pt finished 8 sessions of PT last Wednesday. Pt states he feels lower back pain is worse. History of Present Illness: Reji Chappell is a 63 y.o.  male who presents with Back Pain (Pt presents today for follow up. 10/5/22 X- Ray lumbar spine - Arhtritis L5 - S1. If pain not improved after PT will order MRI. Pt finished 8 sessions of PT last Wednesday. Pt states he feels lower back pain is worse. )      Back Pain  This is a recurrent problem. The current episode started more than 1 month ago. The problem has been gradually worsening since onset. The pain is present in the lumbar spine. The quality of the pain is described as aching. Radiates to: occ pain alternating down both upper thighs. The pain is moderate. The symptoms are aggravated by sitting and standing (pt states it hurts to sit on a hard surface and with standing for a long period of time. ). Associated symptoms include leg pain. Pertinent negatives include no bladder incontinence, bowel incontinence, fever, numbness, paresis, paresthesias, tingling or weakness. (Feels like a hot poker into his back.) He has tried ice, heat, NSAIDs and home exercises (pt has just finished the Physical therapy and pt feels it is worse.) for the symptoms. The treatment provided no relief. Pt has had lower back surgery back in ?   Past Medical History:     Past Medical History:   Diagnosis Date    Back pain     chronic back pain    Insomnia     Osteoarthritis     Pneumonia     walking pneumonia    Sleep apnea     Type 2 diabetes mellitus without complication ([facility] [address]       Reviewed all health maintenance requirements and ordered appropriate tests  Health Maintenance Due   Topic Date Due    HIV screen  Never done    Hepatitis C screen  Never done    DTaP/Tdap/Td vaccine (1 - Tdap) Never done    Shingles vaccine (1 of 2) Never done    COVID-19 Vaccine (3 - Booster for Promimic series) 02/24/2022    Annual Wellness Visit (AWV)  04/07/2022    Low dose CT lung screening  11/03/2022    Colorectal Cancer Screen  11/06/2022       Past Surgical History:     Past Surgical History:   Procedure Laterality Date    BACK SURGERY  2000    CERVICAL FUSION  2007    COLONOSCOPY      COLONOSCOPY  05-    Dr. Petra Webster (Biopsies)    COLONOSCOPY  11/06/2017    DR Janny Green    KNEE CARTILAGE SURGERY Bilateral     ME COLONOSCOPY W/BIOPSY SINGLE/MULTIPLE N/A 11/6/2017    COLONOSCOPY WITH BIOPSY/ polypectomies performed by Maryam Mae MD at [facility] OR        Medications:       Prior to Admission medications    Medication Sig Start Date End Date Taking?  Authorizing Provider   traZODone (DESYREL) 100 MG tablet Take 1 tablet by mouth nightly 10/31/22  Yes Ernie Noel MD   pantoprazole (PROTONIX) 40 MG tablet Take 1 tablet by mouth every morning (before breakfast) 10/19/22  Yes Ernie Noel MD   fexofenadine-pseudoephedrine (ALLEGRA-D 12HR)  MG per extended release tablet Take 1 tablet by mouth 2 times daily 9/6/22  Yes Ernie Noel MD   tamsulosin [facility]) 0.4 MG capsule Take 1 capsule by mouth daily 8/26/22  Yes Ernie Noel MD   montelukast (SINGULAIR) 10 MG tablet Take 1 tablet by mouth nightly 8/26/22  Yes Ernie Noel MD   buPROPion (WELLBUTRIN XL) 300 MG extended release tablet Take 1 tablet by mouth every morning 8/25/22  Yes Ernie Noel MD   baclofen (LIORESAL) 10 MG tablet 2 tablets po in AM and 2 tablets po in pm 5/18/22  Yes Ernie Noel MD   busPIRone (BUSPAR) 10 MG tablet TAKE 1 TABLET BY MOUTH THREE TIMES DAILY AS NEEDED FOR ANXIETY 3/25/22  Yes Historical Provider, MD   clomiPHENE (CLOMID) 50 MG tablet  3/21/22  Yes Historical Provider, MD   sildenafil (VIAGRA) 100 MG tablet Take no more than 1 tablet daily. Take 30 mins before desired sexual activity 11/22/21  Yes Historical Provider, MD   Nutritional Supplements (JOINT FORMULA PO) Take by mouth   Yes Historical Provider, MD   Naproxen Sodium 220 MG CAPS Take 1 tablet by mouth 2 times daily as needed for Pain. Yes Historical Provider, MD   blood glucose test strips (EXACTECH TEST) strip Pt testing blood sugar one daily and as needed 4/9/19   Danny Ruggiero MD        Allergies:       Darvocet a500 [propoxyphene n-acetaminophen]    Social History:     Tobacco:    reports that he quit smoking about 14 years ago. His smoking use included cigarettes. He has a 37.50 pack-year smoking history. He quit smokeless tobacco use about 6 years ago. His smokeless tobacco use included chew. Alcohol:      reports no history of alcohol use. Drug Use:  reports no history of drug use. Family History:     Family History   Problem Relation Age of Onset    Other Mother         dementia    Heart Disease Father     Heart Disease Brother        Review of Systems:       Review of Systems   Constitutional:  Negative for chills and fever. Gastrointestinal:  Negative for bowel incontinence. Good bowel control    Genitourinary:  Negative for bladder incontinence and difficulty urinating. Good bladder control    Musculoskeletal:  Positive for back pain. Pain radiates into both thighs. Skin:  Negative for rash. Neurological:  Negative for tingling, weakness, numbness and paresthesias. Physical Exam:     Physical Exam  Constitutional:       General: He is not in acute distress. Appearance: Normal appearance. He is not ill-appearing. Musculoskeletal:      Lumbar back: Bony tenderness present. Normal range of motion. Negative right straight leg raise test and negative left straight leg raise test.      Comments: Tender over the L3-L5 region to palpate. +5/5 LE strength    Neurological:      Mental Status: He is alert. Vitals:  /70   Pulse 74   SpO2 98%       Data:     Lab Results   Component Value Date/Time     09/27/2022 07:06 AM    K 4.0 09/27/2022 07:06 AM     09/27/2022 07:06 AM    CO2 27 09/27/2022 07:06 AM    BUN 20 09/27/2022 07:06 AM    CREATININE 0.94 09/27/2022 07:06 AM    GLUCOSE 126 09/27/2022 07:06 AM    PROT 7.1 09/27/2022 07:06 AM    LABALBU 4.4 09/27/2022 07:06 AM    BILITOT 0.4 09/27/2022 07:06 AM    ALKPHOS 71 09/27/2022 07:06 AM    AST 26 09/27/2022 07:06 AM    ALT 24 09/27/2022 07:06 AM     Lab Results   Component Value Date/Time    WBC 6.4 09/27/2022 07:06 AM    RBC 4.86 09/27/2022 07:06 AM    HGB 14.7 09/27/2022 07:06 AM    HCT 44.6 09/27/2022 07:06 AM    MCV 91.8 09/27/2022 07:06 AM    MCH 30.2 09/27/2022 07:06 AM    MCHC 33.0 09/27/2022 07:06 AM    RDW 13.2 09/27/2022 07:06 AM     09/27/2022 07:06 AM    MPV 12.3 09/27/2022 07:06 AM     Lab Results   Component Value Date/Time    TSH 2.18 10/06/2020 08:55 AM     Lab Results   Component Value Date/Time    CHOL 140 09/27/2022 07:09 AM    CHOL 188 11/01/2018 12:00 AM    HDL 40 09/27/2022 07:09 AM    PSA 0.76 05/18/2022 07:09 AM    LABA1C 6.3 10/05/2022 07:06 AM          Assessment/Plan:        1. Chronic bilateral low back pain without sciatica   Tried Physical therapy and not better. So with occ leg pain, suggest getting an MRI and then may need referral to neurosurgery or pain mgn. 2 . Leg pain   See #1      Return in about 5 months (around 4/7/2023).       Electronically signed by Glenwood Frankel, MD on 11/7/2022 at 9:04 AM

## 2022-11-28 DIAGNOSIS — M54.50 CHRONIC BILATERAL LOW BACK PAIN WITHOUT SCIATICA: ICD-10-CM

## 2022-11-28 DIAGNOSIS — M79.604 PAIN IN BOTH LOWER EXTREMITIES: ICD-10-CM

## 2022-11-28 DIAGNOSIS — M79.605 PAIN IN BOTH LOWER EXTREMITIES: ICD-10-CM

## 2022-11-28 DIAGNOSIS — G89.29 CHRONIC BILATERAL LOW BACK PAIN WITHOUT SCIATICA: ICD-10-CM

## 2022-11-29 ENCOUNTER — HOSPITAL ENCOUNTER (OUTPATIENT)
Dept: MRI IMAGING | Age: 62
Discharge: HOME | End: 2022-12-01
Payer: MEDICARE

## 2022-11-29 PROCEDURE — 72148 MRI LUMBAR SPINE W/O DYE: CPT

## 2022-12-01 ENCOUNTER — TELEPHONE (OUTPATIENT)
Dept: FAMILY MEDICINE CLINIC | Age: 62
End: 2022-12-01

## 2022-12-01 DIAGNOSIS — G89.29 CHRONIC BILATERAL LOW BACK PAIN WITHOUT SCIATICA: Primary | ICD-10-CM

## 2022-12-01 DIAGNOSIS — M54.50 CHRONIC BILATERAL LOW BACK PAIN WITHOUT SCIATICA: Primary | ICD-10-CM

## 2022-12-01 DIAGNOSIS — R25.2 LEG CRAMPS: ICD-10-CM

## 2022-12-01 NOTE — TELEPHONE ENCOUNTER
----- Message from Deyvi Rayo MA sent at 12/1/2022  3:09 PM EST -----  Pt stated he will see Dr. Dary Zapata    He called and has an appointment for 12/20/22, can provider place a referral

## 2022-12-02 RX ORDER — TAMSULOSIN HYDROCHLORIDE 0.4 MG/1
0.4 CAPSULE ORAL DAILY
Qty: 90 CAPSULE | Refills: 1 | Status: SHIPPED | OUTPATIENT
Start: 2022-12-02

## 2022-12-02 RX ORDER — BACLOFEN 10 MG/1
TABLET ORAL
Qty: 360 TABLET | Refills: 1 | Status: SHIPPED | OUTPATIENT
Start: 2022-12-02

## 2022-12-02 NOTE — TELEPHONE ENCOUNTER
Last visit:  11/7/2022  Next Visit Date:  No future appointments. Medication List:  Prior to Admission medications    Medication Sig Start Date End Date Taking? Authorizing Provider   traZODone (DESYREL) 100 MG tablet Take 1 tablet by mouth nightly 10/31/22   Booker Mason MD   pantoprazole (PROTONIX) 40 MG tablet Take 1 tablet by mouth every morning (before breakfast) 10/19/22   Booker Mason MD   fexofenadine-pseudoephedrine (ALLEGRA-D 12HR)  MG per extended release tablet Take 1 tablet by mouth 2 times daily 9/6/22   Booker Mason MD   tamsulosin [facility]) 0.4 MG capsule Take 1 capsule by mouth daily 8/26/22   Booker Mason MD   montelukast (SINGULAIR) 10 MG tablet Take 1 tablet by mouth nightly 8/26/22   Booker Mason MD   buPROPion (WELLBUTRIN XL) 300 MG extended release tablet Take 1 tablet by mouth every morning 8/25/22   Booker Mason MD   baclofen (LIORESAL) 10 MG tablet 2 tablets po in AM and 2 tablets po in pm 5/18/22   Booker Mason MD   busPIRone (BUSPAR) 10 MG tablet TAKE 1 TABLET BY MOUTH THREE TIMES DAILY AS NEEDED FOR ANXIETY 3/25/22   Historical Provider, MD   clomiPHENE (CLOMID) 50 MG tablet  3/21/22   Historical Provider, MD   sildenafil (VIAGRA) 100 MG tablet Take no more than 1 tablet daily. Take 30 mins before desired sexual activity 11/22/21   Historical Provider, MD   blood glucose test strips (EXACTECH TEST) strip Pt testing blood sugar one daily and as needed 4/9/19   Booker Mason MD   Nutritional Supplements (JOINT FORMULA PO) Take by mouth    Historical Provider, MD   Naproxen Sodium 220 MG CAPS Take 1 tablet by mouth 2 times daily as needed for Pain.     Historical Provider, MD

## 2022-12-27 ENCOUNTER — PATIENT MESSAGE (OUTPATIENT)
Dept: FAMILY MEDICINE CLINIC | Age: 62
End: 2022-12-27

## 2022-12-27 RX ORDER — MONTELUKAST SODIUM 10 MG/1
10 TABLET ORAL NIGHTLY
Qty: 30 TABLET | Refills: 5 | Status: SHIPPED | OUTPATIENT
Start: 2022-12-27

## 2022-12-27 RX ORDER — VALACYCLOVIR HYDROCHLORIDE 1 G/1
2000 TABLET, FILM COATED ORAL 2 TIMES DAILY
Qty: 8 TABLET | Refills: 2 | Status: SHIPPED | OUTPATIENT
Start: 2022-12-27 | End: 2022-12-29

## 2022-12-27 NOTE — TELEPHONE ENCOUNTER
Last OV: 11/7/2022  back pain   10/05/22 chronic   Last RX:    Next scheduled apt: Visit date not found           Pt requesting a refill

## 2022-12-27 NOTE — TELEPHONE ENCOUNTER
From: Genia Kerns  To: Dr. Corrina Ga: 12/27/2022 3:04 PM EST  Subject: Valacyclovir    This is not listed on my chart. I need a refill.  Thanks

## 2023-01-26 ENCOUNTER — PATIENT MESSAGE (OUTPATIENT)
Dept: FAMILY MEDICINE CLINIC | Age: 63
End: 2023-01-26

## 2023-01-26 DIAGNOSIS — Z86.010 HISTORY OF COLON POLYPS: ICD-10-CM

## 2023-01-26 DIAGNOSIS — Z12.11 SCREENING FOR COLORECTAL CANCER: Primary | ICD-10-CM

## 2023-01-26 DIAGNOSIS — Z12.12 SCREENING FOR COLORECTAL CANCER: Primary | ICD-10-CM

## 2023-01-26 NOTE — TELEPHONE ENCOUNTER
From: Antonia Truong  To: Dr. Azra Huw: 1/26/2023 3:55 PM EST  Subject: Colonoscopy     Am I past due for a colonoscop?

## 2023-03-06 RX ORDER — FEXOFENADINE HCL AND PSEUDOEPHEDRINE HCI 60; 120 MG/1; MG/1
1 TABLET, EXTENDED RELEASE ORAL 2 TIMES DAILY
Qty: 60 TABLET | Refills: 5 | Status: SHIPPED | OUTPATIENT
Start: 2023-03-06

## 2023-03-14 ENCOUNTER — PATIENT MESSAGE (OUTPATIENT)
Dept: FAMILY MEDICINE CLINIC | Age: 63
End: 2023-03-14

## 2023-03-15 RX ORDER — BUPROPION HYDROCHLORIDE 300 MG/1
300 TABLET ORAL EVERY MORNING
Qty: 90 TABLET | Refills: 1 | Status: SHIPPED | OUTPATIENT
Start: 2023-03-15

## 2023-03-15 RX ORDER — FEXOFENADINE HCL AND PSEUDOEPHEDRINE HCI 60; 120 MG/1; MG/1
1 TABLET, EXTENDED RELEASE ORAL 2 TIMES DAILY
Qty: 60 TABLET | Refills: 5 | Status: SHIPPED | OUTPATIENT
Start: 2023-03-15

## 2023-03-15 NOTE — TELEPHONE ENCOUNTER
From: Lorenza Woods  To: Dr. Maria A Rosas: 3/14/2023 9:07 PM EDT  Subject: Christiano rodriguez    I requested a refill for this 3-6-23. I still don't have it and I am out. If you have any questions please call me.

## 2023-03-15 NOTE — TELEPHONE ENCOUNTER
Last OV: 11/7/2022 10/05/22 chronic   Last RX:    Next scheduled apt: Visit date not found          Surescript requesting a refill

## 2023-04-03 ENCOUNTER — HOSPITAL ENCOUNTER (OUTPATIENT)
Age: 63
Discharge: HOME | End: 2023-04-03
Payer: MEDICARE

## 2023-04-03 DIAGNOSIS — Z01.818 PRE-OP TESTING: ICD-10-CM

## 2023-04-03 PROCEDURE — 93005 ELECTROCARDIOGRAM TRACING: CPT | Performed by: INTERNAL MEDICINE

## 2023-04-04 LAB
EKG ATRIAL RATE: 78 BPM
EKG P AXIS: 60 DEGREES
EKG P-R INTERVAL: 144 MS
EKG Q-T INTERVAL: 378 MS
EKG QRS DURATION: 100 MS
EKG QTC CALCULATION (BAZETT): 430 MS
EKG R AXIS: 59 DEGREES
EKG T AXIS: 64 DEGREES
EKG VENTRICULAR RATE: 78 BPM

## 2023-04-04 PROCEDURE — 93010 ELECTROCARDIOGRAM REPORT: CPT | Performed by: INTERNAL MEDICINE

## 2023-04-06 ENCOUNTER — PREP FOR PROCEDURE (OUTPATIENT)
Dept: FAMILY MEDICINE CLINIC | Age: 63
End: 2023-04-06

## 2023-04-06 ENCOUNTER — OFFICE VISIT (OUTPATIENT)
Dept: FAMILY MEDICINE CLINIC | Age: 63
End: 2023-04-06
Payer: MEDICARE

## 2023-04-06 VITALS
WEIGHT: 215 LBS | DIASTOLIC BLOOD PRESSURE: 80 MMHG | HEIGHT: 70 IN | SYSTOLIC BLOOD PRESSURE: 138 MMHG | BODY MASS INDEX: 30.78 KG/M2 | HEART RATE: 76 BPM

## 2023-04-06 DIAGNOSIS — Z12.11 COLON CANCER SCREENING: Primary | ICD-10-CM

## 2023-04-06 PROCEDURE — 99213 OFFICE O/P EST LOW 20 MIN: CPT | Performed by: INTERNAL MEDICINE

## 2023-04-06 RX ORDER — SODIUM CHLORIDE, SODIUM LACTATE, POTASSIUM CHLORIDE, CALCIUM CHLORIDE 600; 310; 30; 20 MG/100ML; MG/100ML; MG/100ML; MG/100ML
INJECTION, SOLUTION INTRAVENOUS CONTINUOUS
Status: CANCELLED | OUTPATIENT
Start: 2023-04-06

## 2023-04-06 RX ORDER — SODIUM, POTASSIUM,MAG SULFATES 17.5-3.13G
SOLUTION, RECONSTITUTED, ORAL ORAL
Qty: 1 EACH | Refills: 0 | Status: SHIPPED | OUTPATIENT
Start: 2023-04-06

## 2023-04-06 RX ORDER — SODIUM CHLORIDE 9 MG/ML
25 INJECTION, SOLUTION INTRAVENOUS PRN
Status: CANCELLED | OUTPATIENT
Start: 2023-04-06

## 2023-04-06 RX ORDER — SODIUM CHLORIDE 0.9 % (FLUSH) 0.9 %
5-40 SYRINGE (ML) INJECTION PRN
Status: CANCELLED | OUTPATIENT
Start: 2023-04-06

## 2023-04-06 RX ORDER — SODIUM CHLORIDE 0.9 % (FLUSH) 0.9 %
5-40 SYRINGE (ML) INJECTION EVERY 12 HOURS SCHEDULED
Status: CANCELLED | OUTPATIENT
Start: 2023-04-06

## 2023-04-06 SDOH — ECONOMIC STABILITY: FOOD INSECURITY: WITHIN THE PAST 12 MONTHS, YOU WORRIED THAT YOUR FOOD WOULD RUN OUT BEFORE YOU GOT MONEY TO BUY MORE.: NEVER TRUE

## 2023-04-06 SDOH — ECONOMIC STABILITY: HOUSING INSECURITY
IN THE LAST 12 MONTHS, WAS THERE A TIME WHEN YOU DID NOT HAVE A STEADY PLACE TO SLEEP OR SLEPT IN A SHELTER (INCLUDING NOW)?: NO

## 2023-04-06 SDOH — ECONOMIC STABILITY: FOOD INSECURITY: WITHIN THE PAST 12 MONTHS, THE FOOD YOU BOUGHT JUST DIDN'T LAST AND YOU DIDN'T HAVE MONEY TO GET MORE.: NEVER TRUE

## 2023-04-06 SDOH — ECONOMIC STABILITY: INCOME INSECURITY: HOW HARD IS IT FOR YOU TO PAY FOR THE VERY BASICS LIKE FOOD, HOUSING, MEDICAL CARE, AND HEATING?: NOT VERY HARD

## 2023-04-06 ASSESSMENT — PATIENT HEALTH QUESTIONNAIRE - PHQ9
SUM OF ALL RESPONSES TO PHQ QUESTIONS 1-9: 0
SUM OF ALL RESPONSES TO PHQ QUESTIONS 1-9: 0
7. TROUBLE CONCENTRATING ON THINGS, SUCH AS READING THE NEWSPAPER OR WATCHING TELEVISION: 0
1. LITTLE INTEREST OR PLEASURE IN DOING THINGS: 0
3. TROUBLE FALLING OR STAYING ASLEEP: 0
6. FEELING BAD ABOUT YOURSELF - OR THAT YOU ARE A FAILURE OR HAVE LET YOURSELF OR YOUR FAMILY DOWN: 0
SUM OF ALL RESPONSES TO PHQ QUESTIONS 1-9: 0
5. POOR APPETITE OR OVEREATING: 0
9. THOUGHTS THAT YOU WOULD BE BETTER OFF DEAD, OR OF HURTING YOURSELF: 0
2. FEELING DOWN, DEPRESSED OR HOPELESS: 0
SUM OF ALL RESPONSES TO PHQ QUESTIONS 1-9: 0
4. FEELING TIRED OR HAVING LITTLE ENERGY: 0
8. MOVING OR SPEAKING SO SLOWLY THAT OTHER PEOPLE COULD HAVE NOTICED. OR THE OPPOSITE, BEING SO FIGETY OR RESTLESS THAT YOU HAVE BEEN MOVING AROUND A LOT MORE THAN USUAL: 0
SUM OF ALL RESPONSES TO PHQ9 QUESTIONS 1 & 2: 0

## 2023-04-06 ASSESSMENT — ENCOUNTER SYMPTOMS
CONSTIPATION: 0
SORE THROAT: 0
ABDOMINAL PAIN: 0
NAUSEA: 0
BLOOD IN STOOL: 0
DIARRHEA: 0
RESPIRATORY NEGATIVE: 1

## 2023-04-06 NOTE — PROGRESS NOTES
indicates:      Current Outpatient Medications on File Prior to Visit:  buPROPion (WELLBUTRIN XL) 300 MG extended release tablet, Take 1 tablet by mouth every morning, Disp: 90 tablet, Rfl: 1  fexofenadine-pseudoephedrine (ALLEGRA-D 12HR)  MG per extended release tablet, Take 1 tablet by mouth 2 times daily, Disp: 60 tablet, Rfl: 5  montelukast (SINGULAIR) 10 MG tablet, Take 1 tablet by mouth nightly, Disp: 30 tablet, Rfl: 5  baclofen (LIORESAL) 10 MG tablet, 2 tablets po in AM and 2 tablets po in pm, Disp: 360 tablet, Rfl: 1  tamsulosin (FLOMAX) 0.4 MG capsule, Take 1 capsule by mouth daily, Disp: 90 capsule, Rfl: 1  traZODone (DESYREL) 100 MG tablet, Take 1 tablet by mouth nightly, Disp: 30 tablet, Rfl: 5  pantoprazole (PROTONIX) 40 MG tablet, Take 1 tablet by mouth every morning (before breakfast), Disp: 90 tablet, Rfl: 1  busPIRone (BUSPAR) 10 MG tablet, TAKE 1 TABLET BY MOUTH THREE TIMES DAILY AS NEEDED FOR ANXIETY, Disp: , Rfl:   clomiPHENE (CLOMID) 50 MG tablet, daily, Disp: , Rfl:   sildenafil (VIAGRA) 100 MG tablet, Take no more than 1 tablet daily. Take 30 mins before desired sexual activity, Disp: , Rfl:   blood glucose test strips (EXACTECH TEST) strip, Pt testing blood sugar one daily and as needed, Disp: 100 each, Rfl: 3  Naproxen Sodium 220 MG CAPS, Take 1 tablet by mouth 2 times daily as needed for Pain., Disp: , Rfl:     No current facility-administered medications on file prior to visit.        -- Darvocet A500 [Propoxyphene N-Acetaminophen] -- Other (See Comments)    --  Causes severe head-ache      Lab Results       Component                Value               Date                       NA                       141                 09/27/2022                 K                        4.0                 09/27/2022                 CL                       102                 09/27/2022                 CO2                      27 09/27/2022                 BUN

## 2023-04-06 NOTE — PATIENT INSTRUCTIONS
Survey: You may be receiving a survey from [employer] regarding your visit today. You may get this in the mail, through your MyChart or in your email. Please complete the survey to enable us to provide the highest quality of care to you and your family. Please also, mention our names. If you cannot score us as very good (5 Stars) on any question, please feel free to call the office to discuss how we could have made your experience exceptional.      Thank You! Dr. Shahbaz Rudolph MD    [facility], [address] [place] [address], MARGOT ValenzuelaN CAMILA Gan     Plan:  1. Colon cancer screening  Set up for screening colonoscopy. Risk and benefits explained and informed consent obtained. The bowel prep was explained to [facility] and he was instructed to start the prep the day before the procedure (printed directions were given). Avoid corn 1 week prior to the procedure as this can cause suctioning difficulties during the procedure. Avoid eating or drinking at least 8 hours prior to the procedure. [facility] was encouraged to call the clinic if he has any questions prior to the procedure. - sodium-potassium-mag sulfate (SUPREP BOWEL PREP KIT) 17.5-3.13-1.6 GM/177ML SOLN solution; Use as directed. Dispense: 1 each; Refill: 0  - COLONOSCOPY W/ OR W/O BIOPSY; Future        Follow up with Dr. Luisa Goodman after the procedure.

## 2023-04-23 ENCOUNTER — PATIENT MESSAGE (OUTPATIENT)
Dept: FAMILY MEDICINE CLINIC | Age: 63
End: 2023-04-23

## 2023-04-24 RX ORDER — VALACYCLOVIR HYDROCHLORIDE 1 G/1
2000 TABLET, FILM COATED ORAL 2 TIMES DAILY
Qty: 8 TABLET | Refills: 2 | Status: SHIPPED | OUTPATIENT
Start: 2023-04-24 | End: 2023-04-26

## 2023-04-24 NOTE — TELEPHONE ENCOUNTER
Last OV: 11/7/2022  Last RX: 12/29/2022   Next scheduled apt: Visit date not found      Patient requesting refill

## 2023-04-24 NOTE — TELEPHONE ENCOUNTER
From: Shanna Puga  To: Dr. Sneed Bi: 4/23/2023 1:28 PM EDT  Subject: ValACYclovir    Need new script for this.  Thanks

## 2023-05-01 RX ORDER — PANTOPRAZOLE SODIUM 40 MG/1
40 TABLET, DELAYED RELEASE ORAL
Qty: 90 TABLET | Refills: 1 | Status: SHIPPED | OUTPATIENT
Start: 2023-05-01

## 2023-05-01 NOTE — TELEPHONE ENCOUNTER
Last OV: 11/7/2022  Last RX: 10/19/2022   Next scheduled apt: Visit date not found          Pt requesting refill

## 2023-06-02 ENCOUNTER — PATIENT MESSAGE (OUTPATIENT)
Dept: FAMILY MEDICINE CLINIC | Age: 63
End: 2023-06-02

## 2023-06-02 DIAGNOSIS — E11.9 TYPE 2 DIABETES MELLITUS WITHOUT COMPLICATION, WITHOUT LONG-TERM CURRENT USE OF INSULIN (HCC): Primary | ICD-10-CM

## 2023-06-02 DIAGNOSIS — R25.2 LEG CRAMPS: ICD-10-CM

## 2023-06-02 RX ORDER — BACLOFEN 10 MG/1
TABLET ORAL
Qty: 360 TABLET | Refills: 1 | Status: SHIPPED | OUTPATIENT
Start: 2023-06-02

## 2023-06-02 RX ORDER — TAMSULOSIN HYDROCHLORIDE 0.4 MG/1
0.4 CAPSULE ORAL DAILY
Qty: 90 CAPSULE | Refills: 1 | Status: SHIPPED | OUTPATIENT
Start: 2023-06-02

## 2023-06-02 RX ORDER — BLOOD SUGAR DIAGNOSTIC
STRIP MISCELLANEOUS
Qty: 100 EACH | Refills: 3 | Status: SHIPPED | OUTPATIENT
Start: 2023-06-02

## 2023-06-02 RX ORDER — BLOOD-GLUCOSE METER
EACH MISCELLANEOUS
Qty: 1 KIT | Refills: 0 | Status: SHIPPED | OUTPATIENT
Start: 2023-06-02

## 2023-06-02 NOTE — TELEPHONE ENCOUNTER
Last visit:  11/7/2022  Next Visit Date:  No future appointments. Medication List:  Prior to Admission medications    Medication Sig Start Date End Date Taking? Authorizing Provider   baclofen (LIORESAL) 10 MG tablet 2 tablets po in AM and 2 tablets po in pm 6/2/23   Godfrey Godoy MD   tamsulosin [facility]) 0.4 MG capsule Take 1 capsule by mouth daily 6/2/23   Godfrey Godoy MD   pantoprazole (PROTONIX) 40 MG tablet Take 1 tablet by mouth every morning (before breakfast) 5/1/23   Godfrey Godoy MD   buPROPion (WELLBUTRIN XL) 300 MG extended release tablet Take 1 tablet by mouth every morning 3/15/23   Godfrey Godoy, MD   fexofenadine-pseudoephedrine (ALLEGRA-D 12HR)  MG per extended release tablet Take 1 tablet by mouth 2 times daily 3/15/23   Godfrey Godoy MD   montelukast (SINGULAIR) 10 MG tablet Take 1 tablet by mouth nightly 12/27/22   Godfrey Godoy MD   traZODone (DESYREL) 100 MG tablet Take 1 tablet by mouth nightly 10/31/22   Godfrey Godoy MD   busPIRone (BUSPAR) 10 MG tablet TAKE 1 TABLET BY MOUTH THREE TIMES DAILY AS NEEDED FOR ANXIETY 3/25/22   Historical Provider, MD   clomiPHENE (CLOMID) 50 MG tablet daily 3/21/22   Historical Provider, MD   sildenafil (VIAGRA) 100 MG tablet Take no more than 1 tablet daily. Take 30 mins before desired sexual activity 11/22/21   Historical Provider, MD   blood glucose test strips (EXACTECH TEST) strip Pt testing blood sugar one daily and as needed 4/9/19   Godfrey Godoy MD   Naproxen Sodium 220 MG CAPS Take 1 tablet by mouth 2 times daily as needed for Pain.     Historical Provider, MD

## 2023-06-02 NOTE — TELEPHONE ENCOUNTER
From: Dahlia Collazo  To: Dr. Kidd Hint: 6/2/2023 6:04 AM EDT  Subject: Blood sugar monitor     Could I get a script for a new blood sugar monitor.

## 2023-06-02 NOTE — TELEPHONE ENCOUNTER
Last visit:  11/7/2022  Next Visit Date:  No future appointments. Medication List:  Prior to Admission medications    Medication Sig Start Date End Date Taking? Authorizing Provider   pantoprazole (PROTONIX) 40 MG tablet Take 1 tablet by mouth every morning (before breakfast) 5/1/23   Triny Fowler MD   buPROPion (WELLBUTRIN XL) 300 MG extended release tablet Take 1 tablet by mouth every morning 3/15/23   Triny Fowler MD   fexofenadine-pseudoephedrine (ALLEGRA-D 12HR)  MG per extended release tablet Take 1 tablet by mouth 2 times daily 3/15/23   Triny Fowler MD   montelukast (SINGULAIR) 10 MG tablet Take 1 tablet by mouth nightly 12/27/22   Triny Fowler MD   baclofen (LIORESAL) 10 MG tablet 2 tablets po in AM and 2 tablets po in pm 12/2/22   Triny Fowler MD   tamsulosin [facility]) 0.4 MG capsule Take 1 capsule by mouth daily 12/2/22   Triny Fowler MD   traZODone (DESYREL) 100 MG tablet Take 1 tablet by mouth nightly 10/31/22   Triny Fowler MD   busPIRone (BUSPAR) 10 MG tablet TAKE 1 TABLET BY MOUTH THREE TIMES DAILY AS NEEDED FOR ANXIETY 3/25/22   Historical Provider, MD   clomiPHENE (CLOMID) 50 MG tablet daily 3/21/22   Historical Provider, MD   sildenafil (VIAGRA) 100 MG tablet Take no more than 1 tablet daily. Take 30 mins before desired sexual activity 11/22/21   Historical Provider, MD   blood glucose test strips (EXACTECH TEST) strip Pt testing blood sugar one daily and as needed 4/9/19   Triny Fowler MD   Naproxen Sodium 220 MG CAPS Take 1 tablet by mouth 2 times daily as needed for Pain.     Historical Provider, MD

## 2023-07-06 ENCOUNTER — OFFICE VISIT (OUTPATIENT)
Dept: FAMILY MEDICINE CLINIC | Age: 63
End: 2023-07-06
Payer: MEDICARE

## 2023-07-06 VITALS
HEART RATE: 84 BPM | OXYGEN SATURATION: 94 % | BODY MASS INDEX: 30.42 KG/M2 | WEIGHT: 212 LBS | SYSTOLIC BLOOD PRESSURE: 120 MMHG | DIASTOLIC BLOOD PRESSURE: 80 MMHG

## 2023-07-06 DIAGNOSIS — E11.9 TYPE 2 DIABETES MELLITUS WITHOUT COMPLICATION, WITHOUT LONG-TERM CURRENT USE OF INSULIN (HCC): Primary | ICD-10-CM

## 2023-07-06 DIAGNOSIS — M19.90 ARTHRITIS: ICD-10-CM

## 2023-07-06 DIAGNOSIS — R51.9 SINUS HEADACHE: ICD-10-CM

## 2023-07-06 DIAGNOSIS — F34.1 DYSTHYMIA: ICD-10-CM

## 2023-07-06 PROBLEM — M19.011 PRIMARY OSTEOARTHRITIS OF RIGHT SHOULDER: Status: RESOLVED | Noted: 2017-09-27 | Resolved: 2023-07-06

## 2023-07-06 PROBLEM — F33.1 MODERATE EPISODE OF RECURRENT MAJOR DEPRESSIVE DISORDER (HCC): Status: RESOLVED | Noted: 2022-04-06 | Resolved: 2023-07-06

## 2023-07-06 LAB — HBA1C MFR BLD: 6.5 %

## 2023-07-06 PROCEDURE — 99214 OFFICE O/P EST MOD 30 MIN: CPT | Performed by: FAMILY MEDICINE

## 2023-07-06 PROCEDURE — 83037 HB GLYCOSYLATED A1C HOME DEV: CPT | Performed by: FAMILY MEDICINE

## 2023-07-06 PROCEDURE — 3044F HG A1C LEVEL LT 7.0%: CPT | Performed by: FAMILY MEDICINE

## 2023-07-06 RX ORDER — CELECOXIB 100 MG/1
100 CAPSULE ORAL 2 TIMES DAILY
Qty: 60 CAPSULE | Refills: 5 | Status: SHIPPED | OUTPATIENT
Start: 2023-07-06

## 2023-07-06 NOTE — PROGRESS NOTES
HPI Notes    Name: Karren Bloch  : 1960        Chief Complaint:     Chief Complaint   Patient presents with    Diabetes     Last A1C 6.3 10/5/22       History of Present Illness: Karren Bloch is a 66 y.o.  male who presents with Diabetes (Last A1C 6.3 10/5/22)      Diabetes  He presents for his follow-up diabetic visit. He has type 2 diabetes mellitus. Hypoglycemia symptoms include headaches. Pertinent negatives for hypoglycemia include no dizziness, nervousness/anxiousness or tremors. There are no diabetic associated symptoms. Pertinent negatives for diabetes include no chest pain and no fatigue. There are no hypoglycemic complications. Risk factors for coronary artery disease include diabetes mellitus and male sex. Current diabetic treatment includes diet. He is compliant with treatment all of the time. He is following a generally healthy diet. He has not had a previous visit with a dietitian. There is no change in his home blood glucose trend. Eye exam is current. Arthritis  Presents for follow-up visit. He complains of pain and stiffness. The symptoms have been stable. Affected location: back, hips. Pertinent negatives include no diarrhea, dysuria, fatigue, fever or rash. (Pt has a lot AM stiffness and better if starts moving) Compliance with total regimen is %. Compliance problems include medication side effects. Compliance with medications is % (pt taking motrin but has to stop for a a surgery so had short term ultram but felt motrin helped more. Pt worried about side effects of motrin. ). Depression - much better and even going to counseling less since feeling so much better. Pt not taking the buspar and would like to stop or wean off the wellbutrin. Headaches - pt has been having more sinus HAs in the month. Pt did have to stop the OTC nasal decongestant but can use the Flonase since the sleep apnea device was placed.   Pt trying to take allegra, flonase and

## 2023-07-09 ASSESSMENT — ENCOUNTER SYMPTOMS
TROUBLE SWALLOWING: 0
BLOOD IN STOOL: 0
SHORTNESS OF BREATH: 0
NAUSEA: 0
EYE REDNESS: 0
ABDOMINAL PAIN: 0
VOMITING: 0
CONSTIPATION: 0
DIARRHEA: 0
EYE DISCHARGE: 0
COUGH: 0

## 2023-07-10 RX ORDER — MONTELUKAST SODIUM 10 MG/1
10 TABLET ORAL NIGHTLY
Qty: 30 TABLET | Refills: 5 | Status: SHIPPED | OUTPATIENT
Start: 2023-07-10

## 2023-07-26 ENCOUNTER — HOSPITAL ENCOUNTER (OUTPATIENT)
Age: 63
Discharge: HOME | End: 2023-07-26
Payer: MEDICARE

## 2023-07-26 LAB
ALBUMIN SERPL-MCNC: 4 G/DL (ref 3.5–5.2)
ALBUMIN/GLOB SERPL: 1.3 {RATIO} (ref 1–2.5)
ALP SERPL-CCNC: 84 U/L (ref 40–129)
ALT SERPL-CCNC: 29 U/L (ref 5–41)
ANION GAP SERPL CALCULATED.3IONS-SCNC: 10 MMOL/L (ref 9–17)
AST SERPL-CCNC: 32 U/L
BILIRUB SERPL-MCNC: 0.4 MG/DL (ref 0.3–1.2)
BUN SERPL-MCNC: 21 MG/DL (ref 8–23)
BUN/CREAT SERPL: 26 (ref 9–20)
CALCIUM SERPL-MCNC: 8.6 MG/DL (ref 8.6–10.4)
CHLORIDE SERPL-SCNC: 108 MMOL/L (ref 98–107)
CO2 SERPL-SCNC: 26 MMOL/L (ref 20–31)
CREAT SERPL-MCNC: 0.8 MG/DL (ref 0.7–1.2)
ESTRADIOL LEVEL: 39 PG/ML (ref 27–52)
GFR SERPL CREATININE-BSD FRML MDRD: >60 ML/MIN/1.73M2
GLUCOSE SERPL-MCNC: 132 MG/DL (ref 70–99)
LH SERPL-ACNC: 22.9 MIU/ML (ref 1.7–8.6)
POTASSIUM SERPL-SCNC: 4 MMOL/L (ref 3.7–5.3)
PROT SERPL-MCNC: 7 G/DL (ref 6.4–8.3)
SHBG SERPL-SCNC: 39 NMOL/L (ref 11–80)
SODIUM SERPL-SCNC: 144 MMOL/L (ref 135–144)
TESTOST FREE MFR SERPL: 87.8 PG/ML (ref 47–244)
TESTOST SERPL-MCNC: 468 NG/DL (ref 220–1000)

## 2023-07-26 PROCEDURE — 36415 COLL VENOUS BLD VENIPUNCTURE: CPT

## 2023-07-26 PROCEDURE — 83002 ASSAY OF GONADOTROPIN (LH): CPT

## 2023-07-26 PROCEDURE — 84270 ASSAY OF SEX HORMONE GLOBUL: CPT

## 2023-07-26 PROCEDURE — 82670 ASSAY OF TOTAL ESTRADIOL: CPT

## 2023-07-26 PROCEDURE — 84403 ASSAY OF TOTAL TESTOSTERONE: CPT

## 2023-07-26 PROCEDURE — 80053 COMPREHEN METABOLIC PANEL: CPT

## 2023-10-05 ENCOUNTER — TELEPHONE (OUTPATIENT)
Dept: ONCOLOGY | Age: 63
End: 2023-10-05

## 2023-10-05 NOTE — TELEPHONE ENCOUNTER
Our records indicate that your patient is coming due for their annual lung cancer screening follow up testing, however patient no long meets criteria based on years quit . If you would like patient to have a low dose CT chest please enter the order and we will assist scheduling on getting patient scheduled.      Sincerely,    [id number] Screening [facility]

## 2023-10-13 ENCOUNTER — IMMUNIZATION (OUTPATIENT)
Dept: FAMILY MEDICINE CLINIC | Age: 63
End: 2023-10-13
Payer: MEDICARE

## 2023-10-13 DIAGNOSIS — Z23 NEED FOR INFLUENZA VACCINATION: ICD-10-CM

## 2023-10-13 DIAGNOSIS — Z23 NEED FOR VACCINATION WITH 20-POLYVALENT PNEUMOCOCCAL CONJUGATE VACCINE: Primary | ICD-10-CM

## 2023-10-13 PROCEDURE — 90677 PCV20 VACCINE IM: CPT | Performed by: FAMILY MEDICINE

## 2023-11-15 DIAGNOSIS — R25.2 LEG CRAMPS: ICD-10-CM

## 2023-11-16 RX ORDER — PANTOPRAZOLE SODIUM 40 MG/1
40 TABLET, DELAYED RELEASE ORAL
Qty: 90 TABLET | Refills: 1 | Status: SHIPPED | OUTPATIENT
Start: 2023-11-16

## 2023-11-16 RX ORDER — BACLOFEN 10 MG/1
TABLET ORAL
Qty: 360 TABLET | Refills: 1 | Status: SHIPPED | OUTPATIENT
Start: 2023-11-16

## 2023-11-16 RX ORDER — FEXOFENADINE HCL AND PSEUDOEPHEDRINE HCI 60; 120 MG/1; MG/1
1 TABLET, EXTENDED RELEASE ORAL 2 TIMES DAILY
Qty: 60 TABLET | Refills: 5 | Status: SHIPPED | OUTPATIENT
Start: 2023-11-16

## 2023-11-16 NOTE — TELEPHONE ENCOUNTER
Last OV: 7/6/2023  chronic   Last RX:    Next scheduled apt: 1/10/2024 AWV           Surescript requesting a refill

## 2023-11-16 NOTE — TELEPHONE ENCOUNTER
Last OV: 7/6/2023   chronic   Last RX:    Next scheduled apt: 1/10/24 AWV               Surescript requesting a refill   Medication pending for approval

## 2023-11-16 NOTE — TELEPHONE ENCOUNTER
Bed: ED13  Expected date: 8/8/23  Expected time:   Means of arrival:   Comments:  angus   Last OV: 7/6/2023   chronic   Last RX:    Next scheduled apt: 1/10/24 6 months chronic             Pt requesting a refill

## 2023-12-04 RX ORDER — TAMSULOSIN HYDROCHLORIDE 0.4 MG/1
0.4 CAPSULE ORAL DAILY
Qty: 90 CAPSULE | Refills: 1 | Status: SHIPPED | OUTPATIENT
Start: 2023-12-04

## 2024-01-04 RX ORDER — CELECOXIB 100 MG/1
100 CAPSULE ORAL 2 TIMES DAILY
Qty: 60 CAPSULE | Refills: 5 | Status: SHIPPED | OUTPATIENT
Start: 2024-01-04

## 2024-01-04 RX ORDER — MONTELUKAST SODIUM 10 MG/1
10 TABLET ORAL NIGHTLY
Qty: 30 TABLET | Refills: 5 | Status: SHIPPED | OUTPATIENT
Start: 2024-01-04

## 2024-01-04 NOTE — TELEPHONE ENCOUNTER
Last OV 07/06/23    Requesting refills on Montelukast thru Mychart.   Rx pending.     Next OV 01/10/24

## 2024-01-05 ENCOUNTER — TELEPHONE (OUTPATIENT)
Dept: FAMILY MEDICINE CLINIC | Age: 64
End: 2024-01-05

## 2024-01-05 DIAGNOSIS — E11.9 TYPE 2 DIABETES MELLITUS WITHOUT COMPLICATION, WITHOUT LONG-TERM CURRENT USE OF INSULIN (HCC): Primary | ICD-10-CM

## 2024-01-05 DIAGNOSIS — Z12.5 PROSTATE CANCER SCREENING: ICD-10-CM

## 2024-01-05 NOTE — TELEPHONE ENCOUNTER
Yovanny has a check up on 1/10/24. He believes she needs to have blood work done before. Can we please check and let Yovanny know.      Health Maintenance   Topic Date Due    HIV screen  Never done    Hepatitis C screen  Never done    DTaP/Tdap/Td vaccine (1 - Tdap) Never done    Shingles vaccine (1 of 2) Never done    Respiratory Syncytial Virus (RSV) Pregnant or age 60 yrs+ (1 - 1-dose 60+ series) Never done    Diabetic Alb to Cr ratio (uACR) test  04/06/2023    COVID-19 Vaccine (4 - 2023-24 season) 09/01/2023    Lipids  09/27/2023    Diabetic foot exam  10/05/2023    Annual Wellness Visit (Medicare Advantage)  01/01/2024    Depression Monitoring  04/06/2024    A1C test (Diabetic or Prediabetic)  07/06/2024    GFR test (Diabetes, CKD 3-4, OR last GFR 15-59)  07/26/2024    Diabetic retinal exam  09/05/2024    Colorectal Cancer Screen  04/11/2033    Flu vaccine  Completed    Pneumococcal 0-64 years Vaccine  Completed    Hepatitis A vaccine  Aged Out    Hepatitis B vaccine  Aged Out    Hib vaccine  Aged Out    Polio vaccine  Aged Out    Meningococcal (ACWY) vaccine  Aged Out    Low dose CT lung screening &/or counseling  Discontinued    Prostate Specific Antigen (PSA) Screening or Monitoring  Discontinued             (applicable per patient's age: Cancer Screenings, Depression Screening, Fall Risk Screening, Immunizations)    Hemoglobin A1C (%)   Date Value   07/06/2023 6.5   10/05/2022 6.3   04/06/2022 6.5     LDL Cholesterol (mg/dL)   Date Value   09/27/2022 84     LDL Calculated (mg/dL)   Date Value   11/01/2018 117     AST (U/L)   Date Value   07/26/2023 32     ALT (U/L)   Date Value   07/26/2023 29     BUN (mg/dL)   Date Value   07/26/2023 21      (goal A1C is < 7)   (goal LDL is <100) need 30-50% reduction from baseline     BP Readings from Last 3 Encounters:   07/06/23 120/80   04/11/23 (!) 138/94   04/06/23 138/80    (goal /80)      All Future Testing planned in [facility]:  Lab Frequency Next Occurrence

## 2024-01-07 SDOH — HEALTH STABILITY: PHYSICAL HEALTH: ON AVERAGE, HOW MANY DAYS PER WEEK DO YOU ENGAGE IN MODERATE TO STRENUOUS EXERCISE (LIKE A BRISK WALK)?: 2 DAYS

## 2024-01-07 SDOH — HEALTH STABILITY: PHYSICAL HEALTH: ON AVERAGE, HOW MANY MINUTES DO YOU ENGAGE IN EXERCISE AT THIS LEVEL?: 30 MIN

## 2024-01-07 ASSESSMENT — LIFESTYLE VARIABLES
HOW OFTEN DO YOU HAVE A DRINK CONTAINING ALCOHOL: NEVER
HOW OFTEN DO YOU HAVE A DRINK CONTAINING ALCOHOL: 1
HOW MANY STANDARD DRINKS CONTAINING ALCOHOL DO YOU HAVE ON A TYPICAL DAY: 0
HOW OFTEN DO YOU HAVE SIX OR MORE DRINKS ON ONE OCCASION: 1
HOW MANY STANDARD DRINKS CONTAINING ALCOHOL DO YOU HAVE ON A TYPICAL DAY: PATIENT DOES NOT DRINK

## 2024-01-07 ASSESSMENT — PATIENT HEALTH QUESTIONNAIRE - PHQ9
1. LITTLE INTEREST OR PLEASURE IN DOING THINGS: 0
SUM OF ALL RESPONSES TO PHQ QUESTIONS 1-9: 0
SUM OF ALL RESPONSES TO PHQ9 QUESTIONS 1 & 2: 0
2. FEELING DOWN, DEPRESSED OR HOPELESS: 0

## 2024-01-09 ENCOUNTER — HOSPITAL ENCOUNTER (OUTPATIENT)
Age: 64
Discharge: HOME | End: 2024-01-09
Payer: MEDICARE

## 2024-01-09 DIAGNOSIS — E11.9 TYPE 2 DIABETES MELLITUS WITHOUT COMPLICATION, WITHOUT LONG-TERM CURRENT USE OF INSULIN (HCC): ICD-10-CM

## 2024-01-09 DIAGNOSIS — Z12.5 PROSTATE CANCER SCREENING: ICD-10-CM

## 2024-01-09 LAB
ALBUMIN SERPL-MCNC: 4.2 G/DL (ref 3.5–5.2)
ALBUMIN/GLOB SERPL: 1.6 {RATIO} (ref 1–2.5)
ALP SERPL-CCNC: 99 U/L (ref 40–129)
ALT SERPL-CCNC: 48 U/L (ref 5–41)
ANION GAP SERPL CALCULATED.3IONS-SCNC: 8 MMOL/L (ref 9–17)
AST SERPL-CCNC: 46 U/L
BILIRUB SERPL-MCNC: 0.4 MG/DL (ref 0.3–1.2)
BUN SERPL-MCNC: 17 MG/DL (ref 8–23)
BUN/CREAT SERPL: 19 (ref 9–20)
CALCIUM SERPL-MCNC: 8.8 MG/DL (ref 8.6–10.4)
CHLORIDE SERPL-SCNC: 105 MMOL/L (ref 98–107)
CHOLEST SERPL-MCNC: 145 MG/DL
CHOLESTEROL/HDL RATIO: 4.1
CO2 SERPL-SCNC: 28 MMOL/L (ref 20–31)
CREAT SERPL-MCNC: 0.9 MG/DL (ref 0.7–1.2)
EST. AVERAGE GLUCOSE BLD GHB EST-MCNC: 180 MG/DL
GFR SERPL CREATININE-BSD FRML MDRD: >60 ML/MIN/1.73M2
GLUCOSE SERPL-MCNC: 175 MG/DL (ref 70–99)
HBA1C MFR BLD: 7.9 % (ref 4–6)
HDLC SERPL-MCNC: 35 MG/DL
LDLC SERPL CALC-MCNC: 83 MG/DL (ref 0–130)
POTASSIUM SERPL-SCNC: 4.2 MMOL/L (ref 3.7–5.3)
PROT SERPL-MCNC: 6.9 G/DL (ref 6.4–8.3)
PSA SERPL-MCNC: 0.7 NG/ML (ref 0–4)
SODIUM SERPL-SCNC: 141 MMOL/L (ref 135–144)
TRIGL SERPL-MCNC: 137 MG/DL

## 2024-01-09 PROCEDURE — 36415 COLL VENOUS BLD VENIPUNCTURE: CPT

## 2024-01-09 PROCEDURE — G0103 PSA SCREENING: HCPCS

## 2024-01-09 PROCEDURE — 80061 LIPID PANEL: CPT

## 2024-01-09 PROCEDURE — 80053 COMPREHEN METABOLIC PANEL: CPT

## 2024-01-09 PROCEDURE — 83036 HEMOGLOBIN GLYCOSYLATED A1C: CPT

## 2024-01-10 ENCOUNTER — OFFICE VISIT (OUTPATIENT)
Dept: FAMILY MEDICINE CLINIC | Age: 64
End: 2024-01-10
Payer: MEDICARE

## 2024-01-10 VITALS
SYSTOLIC BLOOD PRESSURE: 128 MMHG | HEIGHT: 68 IN | WEIGHT: 220 LBS | BODY MASS INDEX: 33.34 KG/M2 | OXYGEN SATURATION: 96 % | HEART RATE: 72 BPM | DIASTOLIC BLOOD PRESSURE: 80 MMHG

## 2024-01-10 DIAGNOSIS — M54.50 CHRONIC BILATERAL LOW BACK PAIN WITHOUT SCIATICA: ICD-10-CM

## 2024-01-10 DIAGNOSIS — E11.9 TYPE 2 DIABETES MELLITUS WITHOUT COMPLICATION, WITHOUT LONG-TERM CURRENT USE OF INSULIN (HCC): ICD-10-CM

## 2024-01-10 DIAGNOSIS — K21.9 GASTROESOPHAGEAL REFLUX DISEASE WITHOUT ESOPHAGITIS: ICD-10-CM

## 2024-01-10 DIAGNOSIS — G47.09 OTHER INSOMNIA: ICD-10-CM

## 2024-01-10 DIAGNOSIS — M25.561 ACUTE PAIN OF RIGHT KNEE: ICD-10-CM

## 2024-01-10 DIAGNOSIS — Z00.00 MEDICARE ANNUAL WELLNESS VISIT, SUBSEQUENT: Primary | ICD-10-CM

## 2024-01-10 DIAGNOSIS — F34.1 DYSTHYMIA: ICD-10-CM

## 2024-01-10 DIAGNOSIS — G89.29 CHRONIC BILATERAL LOW BACK PAIN WITHOUT SCIATICA: ICD-10-CM

## 2024-01-10 DIAGNOSIS — R09.82 POST-NASAL DRAINAGE: ICD-10-CM

## 2024-01-10 PROCEDURE — 99214 OFFICE O/P EST MOD 30 MIN: CPT | Performed by: FAMILY MEDICINE

## 2024-01-10 PROCEDURE — 3051F HG A1C>EQUAL 7.0%<8.0%: CPT | Performed by: FAMILY MEDICINE

## 2024-01-10 PROCEDURE — G0439 PPPS, SUBSEQ VISIT: HCPCS | Performed by: FAMILY MEDICINE

## 2024-01-10 RX ORDER — VALACYCLOVIR HYDROCHLORIDE 1 G/1
TABLET, FILM COATED ORAL
COMMUNITY
Start: 2023-12-30

## 2024-01-10 RX ORDER — TIMOLOL MALEATE 5 MG/ML
1 SOLUTION/ DROPS OPHTHALMIC DAILY
COMMUNITY
Start: 2023-12-27

## 2024-01-10 ASSESSMENT — ENCOUNTER SYMPTOMS
VOMITING: 0
SWOLLEN GLANDS: 0
CHOKING: 0
BACK PAIN: 1
NAUSEA: 0
CHANGE IN BOWEL HABIT: 0
HEARTBURN: 0
COUGH: 0
SORE THROAT: 0
ABDOMINAL PAIN: 0
BOWEL INCONTINENCE: 0
BLOOD IN STOOL: 0

## 2024-01-10 NOTE — PROGRESS NOTES
Medicare Annual Wellness Visit    Yovanny Akhtar is here for Medicare AWV (Patient here today for AWV), Diabetes Mellitus (A1C was 7.9 on 1/9/24. On [facility]), Knee Pain (Patient complains of right knee pain, he is asking if would recommend a knee brace), Mental Health Problem (Taking bupropion daily), Lesion(s) (Patient complains of sore area that comes and goes right ear), Pharyngitis (Patient said he is always clearing his throat, not painful. Complains of drainage back of his throat. ), Insomnia (Takes trazodone as needed), and Back Pain (Chronic back pain. )    Assessment & Plan   Type 2 diabetes mellitus without complication, without long-term current use of insulin ([facility])    Recommendations for Preventive Services Due: see orders and patient instructions/AVS.  Recommended screening schedule for the next 5-10 years is provided to the patient in written form: see Patient Instructions/AVS.     No follow-ups on file.     Subjective   The following acute and/or chronic problems were also addressed today: see 6mos note     Patient's complete Health Risk Assessment and screening values have been reviewed and are found in Flowsheets. The following problems were reviewed today and where indicated follow up appointments were made and/or referrals ordered.    Positive Risk Factor Screenings with Interventions:                Activity, Diet, and Weight:  On average, how many days per week do you engage in moderate to strenuous exercise (like a brisk walk)?: 2 days  On average, how many minutes do you engage in exercise at this level?: 30 min    Do you eat balanced/healthy meals regularly?: (!) No    Body mass index is 33.45 kg/m². (!) Abnormal      Do you eat balanced/healthy meals regularly Interventions:  Pt to work on DM diet   Obesity Interventions:  Pt to start walking more and back to DM diet        Dentist Screen:  Have you seen the dentist within the past year?: (!) No    Intervention:  Patient declines any 
AM   PHQ Scores   PHQ2 Score 0 0 2 5 0 0 1   PHQ9 Score 0 0 2 20 0 0 4     Interpretation of Total Score Depression Severity: 1-4 = Minimal depression, 5-9 = Mild depression, 10-14 = Moderate depression, 15-19 = Moderately severe depression, 20-27 = Severe depression      Return in about 6 months (around 7/10/2024) for DM.      Electronically signed by Heather Licona MD on 1/10/2024 at 4:56 PM

## 2024-01-10 NOTE — PATIENT INSTRUCTIONS
any warranty or liability for your use of this information.      Personalized Preventive Plan for Yovanny Akhtar - 1/10/2024  Medicare offers a range of preventive health benefits. Some of the tests and screenings are paid in full while other may be subject to a deductible, co-insurance, and/or copay.    Some of these benefits include a comprehensive review of your medical history including lifestyle, illnesses that may run in your family, and various assessments and screenings as appropriate.    After reviewing your medical record and screening and assessments performed today your provider may have ordered immunizations, labs, imaging, and/or referrals for you.  A list of these orders (if applicable) as well as your Preventive Care list are included within your After Visit Summary for your review.    Other Preventive Recommendations:    A preventive eye exam performed by an eye specialist is recommended every 1-2 years to screen for glaucoma; cataracts, macular degeneration, and other eye disorders.  A preventive dental visit is recommended every 6 months.  Try to get at least 150 minutes of exercise per week or 10,000 steps per day on a pedometer .  Order or download the FREE \"Exercise & Physical Activity: Your Everyday Guide\" from [employer] [place] [employer]. Call 1-430.287.1469 or search [employer] [place] [employer] online.  You need 1789-2944 mg of calcium and 9443-6286 IU of vitamin D per day. It is possible to meet your calcium requirement with diet alone, but a vitamin D supplement is usually necessary to meet this goal.  When exposed to the sun, use a sunscreen that protects against both UVA and UVB radiation with an SPF of 30 or greater. Reapply every 2 to 3 hours or after sweating, drying off with a towel, or swimming.  Always wear a seat belt when traveling in a car. Always wear a helmet when riding a bicycle or motorcycle.

## 2024-02-06 ENCOUNTER — HOSPITAL ENCOUNTER (OUTPATIENT)
Age: 64
Discharge: HOME | End: 2024-02-06
Payer: MEDICARE

## 2024-02-06 LAB
BASOPHILS # BLD: 0.05 K/UL (ref 0–0.2)
BASOPHILS NFR BLD: 1 % (ref 0–2)
EOSINOPHIL # BLD: 0.04 K/UL (ref 0–0.44)
EOSINOPHILS RELATIVE PERCENT: 1 % (ref 1–4)
ERYTHROCYTE [DISTWIDTH] IN BLOOD BY AUTOMATED COUNT: 13.2 % (ref 11.8–14.4)
HCT VFR BLD AUTO: 45.1 % (ref 40.7–50.3)
HGB BLD-MCNC: 14.9 G/DL (ref 13–17)
IMM GRANULOCYTES # BLD AUTO: <0.03 K/UL (ref 0–0.3)
IMM GRANULOCYTES NFR BLD: 0 %
LYMPHOCYTES NFR BLD: 1.5 K/UL (ref 1.1–3.7)
LYMPHOCYTES RELATIVE PERCENT: 27 % (ref 24–43)
MCH RBC QN AUTO: 29.4 PG (ref 25.2–33.5)
MCHC RBC AUTO-ENTMCNC: 33 G/DL (ref 28.4–34.8)
MCV RBC AUTO: 89.1 FL (ref 82.6–102.9)
MONOCYTES NFR BLD: 0.58 K/UL (ref 0.1–1.2)
MONOCYTES NFR BLD: 11 % (ref 3–12)
NEUTROPHILS NFR BLD: 60 % (ref 36–65)
NEUTS SEG NFR BLD: 3.3 K/UL (ref 1.5–8.1)
NRBC BLD-RTO: 0 PER 100 WBC
PLATELET # BLD AUTO: 183 K/UL (ref 138–453)
PMV BLD AUTO: 12.4 FL (ref 8.1–13.5)
RBC # BLD AUTO: 5.06 M/UL (ref 4.21–5.77)
SHBG SERPL-SCNC: 47 NMOL/L (ref 11–80)
TESTOST FREE MFR SERPL: 54.5 PG/ML (ref 47–244)
TESTOST SERPL-MCNC: 343 NG/DL (ref 220–1000)
WBC OTHER # BLD: 5.5 K/UL (ref 3.5–11.3)

## 2024-02-06 PROCEDURE — 83002 ASSAY OF GONADOTROPIN (LH): CPT

## 2024-02-06 PROCEDURE — 84270 ASSAY OF SEX HORMONE GLOBUL: CPT

## 2024-02-06 PROCEDURE — 85025 COMPLETE CBC W/AUTO DIFF WBC: CPT

## 2024-02-06 PROCEDURE — 82670 ASSAY OF TOTAL ESTRADIOL: CPT

## 2024-02-06 PROCEDURE — 36415 COLL VENOUS BLD VENIPUNCTURE: CPT

## 2024-02-06 PROCEDURE — 84403 ASSAY OF TOTAL TESTOSTERONE: CPT

## 2024-02-07 LAB
ESTRADIOL LEVEL: 20 PG/ML (ref 27–52)
LH SERPL-ACNC: 10 MIU/ML (ref 1.7–8.6)

## 2024-03-04 ENCOUNTER — PATIENT MESSAGE (OUTPATIENT)
Dept: FAMILY MEDICINE CLINIC | Age: 64
End: 2024-03-04

## 2024-03-04 RX ORDER — VALACYCLOVIR HYDROCHLORIDE 1 G/1
TABLET, FILM COATED ORAL
Qty: 16 TABLET | Refills: 1 | Status: SHIPPED | OUTPATIENT
Start: 2024-03-04

## 2024-03-04 RX ORDER — TRAZODONE HYDROCHLORIDE 100 MG/1
100 TABLET ORAL NIGHTLY
Qty: 30 TABLET | Refills: 5 | Status: SHIPPED | OUTPATIENT
Start: 2024-03-04

## 2024-03-04 RX ORDER — IBUPROFEN 800 MG/1
800 TABLET ORAL 3 TIMES DAILY PRN
Qty: 90 TABLET | Refills: 5 | Status: SHIPPED | OUTPATIENT
Start: 2024-03-04

## 2024-03-04 NOTE — TELEPHONE ENCOUNTER
Ok tell pt I took the celebrex off his chart but then does he need prescription for Ibuprofen 800mg?? Call pt and let me know....

## 2024-03-04 NOTE — TELEPHONE ENCOUNTER
From: Yovanny Akhtar  To: Dr. Heather Licona  Sent: 3/4/2024 7:09 AM EST  Subject: Celecoxib    Doesn't seem to be working anymore, so I have stopped taking and went back to 800mg ibuprofen.

## 2024-03-04 NOTE — TELEPHONE ENCOUNTER
From: Yovanny Akhtar  To: Dr. Heather Licona  Sent: 3/4/2024 7:12 AM EST  Subject: Trazadone and valACYclovir    Need scripts for both. Thank you...

## 2024-03-04 NOTE — TELEPHONE ENCOUNTER
Last OV: 1/10/2024 AWV chronic   Last RX:    Next scheduled apt: 7/10/2024 DM '            Surescript requesting a refill

## 2024-03-18 ENCOUNTER — PATIENT MESSAGE (OUTPATIENT)
Dept: FAMILY MEDICINE CLINIC | Age: 64
End: 2024-03-18

## 2024-03-18 NOTE — TELEPHONE ENCOUNTER
Last OV: 1/10/2024 AWV  Last RX:    Next scheduled apt: 7/10/2024  6 MONTHS DM             Pt requesting a refill

## 2024-03-18 NOTE — TELEPHONE ENCOUNTER
From: Yovanny Akhtar  To: Dr. Heather Licona  Sent: 3/18/2024 3:08 PM EDT  Subject: [facility] for one touch ultra 2    Can I get a script for one touch ultra 2 [facility]? Thanks..

## 2024-03-19 RX ORDER — LANCETS 30 GAUGE
1 EACH MISCELLANEOUS 2 TIMES DAILY
Qty: 100 EACH | Refills: 5 | Status: SHIPPED | OUTPATIENT
Start: 2024-03-19

## 2024-05-02 ENCOUNTER — PATIENT MESSAGE (OUTPATIENT)
Dept: FAMILY MEDICINE CLINIC | Age: 64
End: 2024-05-02

## 2024-05-02 NOTE — TELEPHONE ENCOUNTER
From: Yovanny Akhtar  To: Dr. Heather Licona  Sent: 5/2/2024 5:11 AM EDT  Subject: Lancets    Can I get a script for some Lancets for a one touch ultra 2? Thanks!!!!

## 2024-05-09 RX ORDER — FEXOFENADINE HYDROCHLORIDE AND PSEUDOEPHEDRINE HYDROCHLORIDE 60; 120 MG/1; MG/1
1 TABLET, FILM COATED, EXTENDED RELEASE ORAL 2 TIMES DAILY
Qty: 60 TABLET | Refills: 5 | Status: SHIPPED | OUTPATIENT
Start: 2024-05-09

## 2024-05-09 NOTE — TELEPHONE ENCOUNTER
Last OV: 1/10/2024 AWV   Last RX:    Next scheduled apt: 7/10/2024              Surescript requesting a refill

## 2024-05-13 RX ORDER — PANTOPRAZOLE SODIUM 40 MG/1
TABLET, DELAYED RELEASE ORAL
Qty: 90 TABLET | Refills: 1 | Status: SHIPPED | OUTPATIENT
Start: 2024-05-13

## 2024-05-18 DIAGNOSIS — R25.2 LEG CRAMPS: ICD-10-CM

## 2024-05-20 RX ORDER — BACLOFEN 10 MG/1
TABLET ORAL
Qty: 360 TABLET | Refills: 0 | Status: SHIPPED | OUTPATIENT
Start: 2024-05-20

## 2024-05-20 RX ORDER — FEXOFENADINE HCL AND PSEUDOEPHEDRINE HCI 60; 120 MG/1; MG/1
1 TABLET, EXTENDED RELEASE ORAL 2 TIMES DAILY
Qty: 60 TABLET | Refills: 5 | OUTPATIENT
Start: 2024-05-20

## 2024-05-20 NOTE — TELEPHONE ENCOUNTER
Last OV 1/10/24 AWV    Next OV 7/10/24    Requesting refill on baclofen thru [facility].  Rx pending

## 2024-05-21 RX ORDER — FEXOFENADINE HCL AND PSEUDOEPHEDRINE HCI 60; 120 MG/1; MG/1
1 TABLET, EXTENDED RELEASE ORAL 2 TIMES DAILY
Qty: 60 TABLET | Refills: 5 | Status: SHIPPED | OUTPATIENT
Start: 2024-05-21

## 2024-05-21 NOTE — TELEPHONE ENCOUNTER
Last OV: 1/10/2024  AWV   Last RX:    Next scheduled apt: 7/10/2024   6 MONTHS             Pt requesting a refill         Medication never received by the pharmacy

## 2024-05-28 RX ORDER — TAMSULOSIN HYDROCHLORIDE 0.4 MG/1
0.4 CAPSULE ORAL DAILY
Qty: 90 CAPSULE | Refills: 1 | Status: SHIPPED | OUTPATIENT
Start: 2024-05-28

## 2024-05-28 RX ORDER — PANTOPRAZOLE SODIUM 40 MG/1
40 TABLET, DELAYED RELEASE ORAL DAILY
Qty: 90 TABLET | Refills: 1 | Status: SHIPPED | OUTPATIENT
Start: 2024-05-28

## 2024-05-28 RX ORDER — TAMSULOSIN HYDROCHLORIDE 0.4 MG/1
0.4 CAPSULE ORAL DAILY
Qty: 90 CAPSULE | Refills: 0 | Status: SHIPPED | OUTPATIENT
Start: 2024-05-28

## 2024-05-28 NOTE — TELEPHONE ENCOUNTER
Last OV 1/10/24 for AWV and chronics  Requesting refill on tamsulosin thru [facility] script  Next OV 7/10/24

## 2024-05-31 RX ORDER — TAMSULOSIN HYDROCHLORIDE 0.4 MG/1
0.4 CAPSULE ORAL DAILY
Qty: 90 CAPSULE | Refills: 1 | OUTPATIENT
Start: 2024-05-31

## 2024-06-26 RX ORDER — MONTELUKAST SODIUM 10 MG/1
10 TABLET ORAL NIGHTLY
Qty: 30 TABLET | Refills: 5 | Status: SHIPPED | OUTPATIENT
Start: 2024-06-26

## 2024-06-26 NOTE — TELEPHONE ENCOUNTER
Last OV: 1/10/2024  AWV and chronic visit   Last RX:    Next scheduled apt: 7/10/2024   6 months DM              Pt requesting a refill

## 2024-06-27 DIAGNOSIS — E11.9 TYPE 2 DIABETES MELLITUS WITHOUT COMPLICATION, WITHOUT LONG-TERM CURRENT USE OF INSULIN (HCC): ICD-10-CM

## 2024-06-27 RX ORDER — BLOOD SUGAR DIAGNOSTIC
STRIP MISCELLANEOUS
Qty: 100 EACH | Refills: 3 | Status: SHIPPED | OUTPATIENT
Start: 2024-06-27

## 2024-06-27 NOTE — TELEPHONE ENCOUNTER
Last OV: 1/10/2024 AWV chronic visit   Last RX:    Next scheduled apt: 7/10/2024  6 months DM         Surescript requesting a refill

## 2024-07-10 ENCOUNTER — OFFICE VISIT (OUTPATIENT)
Dept: FAMILY MEDICINE CLINIC | Age: 64
End: 2024-07-10
Payer: MEDICARE

## 2024-07-10 VITALS
DIASTOLIC BLOOD PRESSURE: 80 MMHG | SYSTOLIC BLOOD PRESSURE: 122 MMHG | HEART RATE: 76 BPM | WEIGHT: 210 LBS | BODY MASS INDEX: 31.93 KG/M2 | OXYGEN SATURATION: 97 %

## 2024-07-10 DIAGNOSIS — E11.9 TYPE 2 DIABETES MELLITUS WITHOUT COMPLICATION, WITHOUT LONG-TERM CURRENT USE OF INSULIN (HCC): Primary | ICD-10-CM

## 2024-07-10 DIAGNOSIS — K21.9 GASTROESOPHAGEAL REFLUX DISEASE WITHOUT ESOPHAGITIS: ICD-10-CM

## 2024-07-10 DIAGNOSIS — J30.89 SEASONAL ALLERGIC RHINITIS DUE TO OTHER ALLERGIC TRIGGER: ICD-10-CM

## 2024-07-10 DIAGNOSIS — Z12.5 PROSTATE CANCER SCREENING: ICD-10-CM

## 2024-07-10 DIAGNOSIS — G47.09 OTHER INSOMNIA: ICD-10-CM

## 2024-07-10 DIAGNOSIS — F34.1 DYSTHYMIA: ICD-10-CM

## 2024-07-10 LAB — HBA1C MFR BLD: 7 %

## 2024-07-10 PROCEDURE — 83036 HEMOGLOBIN GLYCOSYLATED A1C: CPT | Performed by: FAMILY MEDICINE

## 2024-07-10 PROCEDURE — 3051F HG A1C>EQUAL 7.0%<8.0%: CPT | Performed by: FAMILY MEDICINE

## 2024-07-10 PROCEDURE — 99214 OFFICE O/P EST MOD 30 MIN: CPT | Performed by: FAMILY MEDICINE

## 2024-07-10 RX ORDER — TRAZODONE HYDROCHLORIDE 100 MG/1
200 TABLET ORAL NIGHTLY PRN
Qty: 180 TABLET | Refills: 1 | Status: SHIPPED | OUTPATIENT
Start: 2024-07-10

## 2024-07-10 RX ORDER — TRAZODONE HYDROCHLORIDE 100 MG/1
200 TABLET ORAL NIGHTLY PRN
COMMUNITY
End: 2024-07-10 | Stop reason: SDUPTHER

## 2024-07-10 ASSESSMENT — ENCOUNTER SYMPTOMS
EYE REDNESS: 0
DIARRHEA: 0
TROUBLE SWALLOWING: 0
COUGH: 0
VOMITING: 0
EYE DISCHARGE: 0
BLOOD IN STOOL: 0
NAUSEA: 0
ABDOMINAL PAIN: 0

## 2024-07-10 NOTE — PATIENT INSTRUCTIONS
SURVEY:    You may be receiving a survey from [facility] regarding your visit today.    Please complete the survey to enable us to provide the highest quality of care to you and your family.    If you cannot score us a very good (5 Stars) on any question, please call the office to discuss how we could have made your experience a very good one.    Thank you.    Clinical Care Team: MD Wilfredo Shepard LPN              Triage: Sonia Hatfield CMA              Clerical Team: Sonia Mac

## 2024-07-10 NOTE — PROGRESS NOTES
HPI Notes    Name: Yovanny Akhtar  : 1960        Chief Complaint:     Chief Complaint   Patient presents with    Diabetes Mellitus     6 month check up. Last A1C was 7.9 in     Insomnia    Allergic Rhinitis     Gastroesophageal Reflux       History of Present Illness:     Yovanny Akhtar is a 64 y.o.  male who presents with Diabetes Mellitus (6 month check up. Last A1C was 7.9 in ), Insomnia, Allergic Rhinitis , and Gastroesophageal Reflux      Diabetes  He presents for his follow-up diabetic visit. He has type 2 diabetes mellitus. There are no hypoglycemic associated symptoms. Pertinent negatives for hypoglycemia include no dizziness, headaches or tremors. There are no diabetic associated symptoms. Pertinent negatives for diabetes include no chest pain and no fatigue. There are no hypoglycemic complications. Risk factors for coronary artery disease include diabetes mellitus and male sex. Current diabetic treatment includes diet. He is compliant with treatment most of the time. His weight is decreasing steadily. He is following a generally healthy diet. He participates in exercise daily. His home blood glucose trend is decreasing steadily (hgba1c 7.0).   Insomnia  This is a chronic problem. The current episode started more than 1 year ago. The problem occurs daily. The problem has been gradually improving (pt taking the Trazodone 100mg nightly and occ takes 2 tablets at night). Pertinent negatives include no abdominal pain, chest pain, chills, coughing, fatigue, fever, headaches, joint swelling, nausea, rash or vomiting. Treatments tried: trazodone.   Allergies  Presents for follow-up visit. He reports no cough, fatigue, fever, headaches or rash. The problem occurs every several days. Symptom severity has been mild. The treatment provided significant relief. Compliance with medications is % (allegra D BID). There are no compliance problems.    Gastroesophageal Reflux  He reports no

## 2024-07-17 ENCOUNTER — TELEPHONE (OUTPATIENT)
Dept: FAMILY MEDICINE CLINIC | Age: 64
End: 2024-07-17

## 2024-07-17 NOTE — TELEPHONE ENCOUNTER
Tell pt we do not need to order screening lung CT. I reviewed again the NOv 2022 CT and no nodules and now it has been 15yrs since smoking so no longer recommend the CT  lung.

## 2024-07-17 NOTE — TELEPHONE ENCOUNTER
Last OV 7/10/24 for chronics  CT lung without contrast pending  Category 1. No nodules. Advise annual low-dose CT scanning of the chest.   This is what CT screening showed on 11/8/22  Not sure what dx to use

## 2024-07-25 ENCOUNTER — PATIENT MESSAGE (OUTPATIENT)
Dept: FAMILY MEDICINE CLINIC | Age: 64
End: 2024-07-25

## 2024-07-25 RX ORDER — MONTELUKAST SODIUM 10 MG/1
10 TABLET ORAL NIGHTLY
Qty: 30 TABLET | Refills: 5 | OUTPATIENT
Start: 2024-07-25

## 2024-07-25 RX ORDER — MONTELUKAST SODIUM 10 MG/1
10 TABLET ORAL NIGHTLY
Qty: 90 TABLET | Refills: 1 | Status: SHIPPED | OUTPATIENT
Start: 2024-07-25

## 2024-07-25 NOTE — TELEPHONE ENCOUNTER
From: Yovanny Akhtar  To: Dr. Heather Licona  Sent: 7/25/2024 2:08 PM EDT  Subject: Montelukast    My insurance says I can get this in a 90 day supply. If that is so then please write it for that. Thanks

## 2024-07-25 NOTE — TELEPHONE ENCOUNTER
Last OV: 7/10/2024  chronic  Last RX:    Next scheduled apt: 1/15/2025  6 months chronic             Pt requesting a refill

## 2024-08-14 ENCOUNTER — HOSPITAL ENCOUNTER (OUTPATIENT)
Age: 64
Discharge: HOME | End: 2024-08-14
Payer: MEDICARE

## 2024-08-14 LAB
ALBUMIN SERPL-MCNC: 4.2 G/DL (ref 3.5–5.2)
ALBUMIN/GLOB SERPL: 1.4 {RATIO} (ref 1–2.5)
ALP SERPL-CCNC: 83 U/L (ref 40–129)
ALT SERPL-CCNC: 24 U/L (ref 5–41)
ANION GAP SERPL CALCULATED.3IONS-SCNC: 12 MMOL/L (ref 9–17)
AST SERPL-CCNC: 34 U/L
BILIRUB SERPL-MCNC: 0.5 MG/DL (ref 0.3–1.2)
BUN SERPL-MCNC: 18 MG/DL (ref 8–23)
BUN/CREAT SERPL: 20 (ref 9–20)
CALCIUM SERPL-MCNC: 9.1 MG/DL (ref 8.6–10.4)
CHLORIDE SERPL-SCNC: 103 MMOL/L (ref 98–107)
CO2 SERPL-SCNC: 27 MMOL/L (ref 20–31)
CREAT SERPL-MCNC: 0.9 MG/DL (ref 0.7–1.2)
ERYTHROCYTE [DISTWIDTH] IN BLOOD BY AUTOMATED COUNT: 13.7 % (ref 11.8–14.4)
EST. AVERAGE GLUCOSE BLD GHB EST-MCNC: 163 MG/DL
GFR, ESTIMATED: >90 ML/MIN/1.73M2
GLUCOSE SERPL-MCNC: 141 MG/DL (ref 70–99)
HBA1C MFR BLD: 7.3 % (ref 4–6)
HCT VFR BLD AUTO: 42.9 % (ref 40.7–50.3)
HGB BLD-MCNC: 14.6 G/DL (ref 13–17)
INSULIN REFERENCE RANGE:: NORMAL
INSULIN: 16.5 MU/L
LH SERPL-ACNC: 13.7 MIU/ML (ref 1.7–8.6)
MCH RBC QN AUTO: 30.2 PG (ref 25.2–33.5)
MCHC RBC AUTO-ENTMCNC: 34 G/DL (ref 28.4–34.8)
MCV RBC AUTO: 88.8 FL (ref 82.6–102.9)
NRBC BLD-RTO: 0 PER 100 WBC
PLATELET # BLD AUTO: 141 K/UL (ref 138–453)
PMV BLD AUTO: 12.4 FL (ref 8.1–13.5)
POTASSIUM SERPL-SCNC: 4 MMOL/L (ref 3.7–5.3)
PROLACTIN SERPL-MCNC: 6.9 NG/ML (ref 4.04–15.2)
PROT SERPL-MCNC: 7.1 G/DL (ref 6.4–8.3)
RBC # BLD AUTO: 4.83 M/UL (ref 4.21–5.77)
SHBG SERPL-SCNC: 36 NMOL/L (ref 19–76)
SODIUM SERPL-SCNC: 142 MMOL/L (ref 135–144)
TESTOST FREE MFR SERPL: 90.7 PG/ML (ref 47–244)
TESTOST SERPL-MCNC: 461 NG/DL (ref 193–740)
WBC OTHER # BLD: 5.9 K/UL (ref 3.5–11.3)

## 2024-08-14 PROCEDURE — 84146 ASSAY OF PROLACTIN: CPT

## 2024-08-14 PROCEDURE — 84403 ASSAY OF TOTAL TESTOSTERONE: CPT

## 2024-08-14 PROCEDURE — 36415 COLL VENOUS BLD VENIPUNCTURE: CPT

## 2024-08-14 PROCEDURE — 83002 ASSAY OF GONADOTROPIN (LH): CPT

## 2024-08-14 PROCEDURE — 83525 ASSAY OF INSULIN: CPT

## 2024-08-14 PROCEDURE — 85027 COMPLETE CBC AUTOMATED: CPT

## 2024-08-14 PROCEDURE — 83036 HEMOGLOBIN GLYCOSYLATED A1C: CPT

## 2024-08-14 PROCEDURE — 84270 ASSAY OF SEX HORMONE GLOBUL: CPT

## 2024-08-14 PROCEDURE — 80053 COMPREHEN METABOLIC PANEL: CPT

## 2024-08-15 DIAGNOSIS — R25.2 LEG CRAMPS: ICD-10-CM

## 2024-08-15 RX ORDER — BACLOFEN 10 MG/1
TABLET ORAL
Qty: 360 TABLET | Refills: 0 | Status: SHIPPED | OUTPATIENT
Start: 2024-08-15

## 2024-08-15 NOTE — TELEPHONE ENCOUNTER
Last OV 7/10/24     Next OV 01/15/25    Requesting refill on baclofen thru [facility].  Rx pending

## 2024-10-25 ENCOUNTER — TELEPHONE (OUTPATIENT)
Dept: FAMILY MEDICINE CLINIC | Age: 64
End: 2024-10-25

## 2024-10-25 NOTE — TELEPHONE ENCOUNTER
Yovanny is c/o left wrist pain. He said he can hardly hold anything and its just getting worse. He has been wearing a brace for 2 weeks and that isnt helping. No injury. Can he get an order for an xray? Please let patient know.      Health Maintenance   Topic Date Due    HIV screen  Never done    Hepatitis C screen  Never done    DTaP/Tdap/Td vaccine (1 - Tdap) Never done    Shingles vaccine (1 of 2) Never done    Respiratory Syncytial Virus (RSV) Pregnant or age 60 yrs+ (1 - 1-dose 60+ series) Never done    Diabetic Alb to Cr ratio (uACR) test  04/06/2023    Flu vaccine (1) 08/01/2024    COVID-19 Vaccine (4 - 2023-24 season) 09/01/2024    Diabetic retinal exam  09/05/2024    Depression Monitoring  01/07/2025    Lipids  01/09/2025    Diabetic foot exam  07/10/2025    A1C test (Diabetic or Prediabetic)  08/14/2025    GFR test (Diabetes, CKD 3-4, OR last GFR 15-59)  08/14/2025    Colorectal Cancer Screen  04/11/2033    Annual Wellness Visit (Medicare Advantage)  Completed    Pneumococcal 0-64 years Vaccine  Completed    Hepatitis A vaccine  Aged Out    Hepatitis B vaccine  Aged Out    Hib vaccine  Aged Out    Polio vaccine  Aged Out    Meningococcal (ACWY) vaccine  Aged Out    Lung Cancer Screening &/or Counseling  Discontinued    Prostate Specific Antigen (PSA) Screening or Monitoring  Discontinued             (applicable per patient's age: Cancer Screenings, Depression Screening, Fall Risk Screening, Immunizations)    Hemoglobin A1C (%)   Date Value   08/14/2024 7.3 (H)   07/10/2024 7.0   01/09/2024 7.9 (H)     AST (U/L)   Date Value   08/14/2024 34     ALT (U/L)   Date Value   08/14/2024 24     BUN (mg/dL)   Date Value   08/14/2024 18      (goal A1C is < 7)   (goal LDL is <100) need 30-50% reduction from baseline     BP Readings from Last 3 Encounters:   07/10/24 122/80   01/10/24 128/80   07/06/23 120/80    (goal /80)      All Future Testing planned in [facility]:  Lab Frequency Next Occurrence   Lipid Panel

## 2024-10-25 NOTE — TELEPHONE ENCOUNTER
Please tell pt unfortunately I am off today and check message periodically. If no injury and pain that bad it needs to be seen. Besides an Xray only looks at bone and for fractures it may on exam be more muscle or tendon. If can't be seen today then ice and tylenol or alleve until get an appt or ER if pain getting worse.

## 2024-10-29 ENCOUNTER — OFFICE VISIT (OUTPATIENT)
Dept: FAMILY MEDICINE CLINIC | Age: 64
End: 2024-10-29

## 2024-10-29 VITALS
HEART RATE: 80 BPM | DIASTOLIC BLOOD PRESSURE: 70 MMHG | SYSTOLIC BLOOD PRESSURE: 122 MMHG | BODY MASS INDEX: 31.78 KG/M2 | OXYGEN SATURATION: 97 % | WEIGHT: 209 LBS

## 2024-10-29 DIAGNOSIS — Z23 NEED FOR INFLUENZA VACCINATION: ICD-10-CM

## 2024-10-29 DIAGNOSIS — M25.532 LEFT WRIST PAIN: Primary | ICD-10-CM

## 2024-10-29 RX ORDER — CELECOXIB 100 MG/1
100 CAPSULE ORAL 2 TIMES DAILY
Qty: 30 CAPSULE | Refills: 1 | Status: SHIPPED | OUTPATIENT
Start: 2024-10-29 | End: 2024-11-28

## 2024-10-29 NOTE — PROGRESS NOTES
After obtaining consent, and per orders of  injection of flucelvax given in Right deltoid by Florencia Johnson LPN. Patient instructed to remain in clinic for 20 minutes afterwards, and to report any adverse reaction to me immediately.    Immunizations Administered       Name Date Dose Route    Influenza, FLUCELVAX, (age 6 mo+) IM, Trivalent PF, 0.5mL 10/29/2024 0.5 mL Intramuscular    Site: Deltoid- Left    Lot: [id number]    NDC: 43835-934-22            Vaccine Information Sheet, \"Influenza - Inactivated\"  given to Yovanny Akhtar, or parent/legal guardian of  Yovanny Akhtar and verbalized understanding.    Patient responses:    Have you ever had a reaction to a flu vaccine? No  Are you able to eat eggs without adverse effects?  Yes  Do you have any current illness?  No  Have you ever had [place] Syndrome?  No    Flu vaccine given per order. Please see immunization tab.               
Suppl (ONE TOUCH ULTRA 2) w/Device KIT Test sugar once daily 23  Yes Heather Licona MD   buPROPion (WELLBUTRIN XL) 300 MG extended release tablet Take 1 tablet by mouth every morning 3/15/23  Yes Heather Licona MD   clomiPHENE (CLOMID) 50 MG tablet daily 3/21/22  Yes Brenda Henry MD   sildenafil (VIAGRA) 100 MG tablet Take no more than 1 tablet daily.  Take 30 mins before desired sexual activity 21  Yes Brenda Henry MD   blood glucose test strips (EXACTECH TEST) strip Pt testing blood sugar one daily and as needed 19  Yes Heather Licona MD   ibuprofen (ADVIL;MOTRIN) 800 MG tablet Take 1 tablet by mouth 3 times daily as needed for Pain  Patient not taking: Reported on 10/29/2024 3/4/24   Heather Licona MD   Naproxen Sodium 220 MG CAPS Take 1 tablet by mouth 2 times daily as needed for Pain.  Patient not taking: Reported on 7/10/2024    ProviderBrenda MD        Allergies:       Acetaminophen and Darvocet a500 [propoxyphene n-acetaminophen]    Social History:     Tobacco:    reports that he quit smoking about 21 years ago. His smoking use included cigarettes. He started smoking about 48 years ago. He has a 40.7 pack-year smoking history. He has been exposed to tobacco smoke. He quit smokeless tobacco use about 8 years ago.  His smokeless tobacco use included chew.  Alcohol:      reports that he does not currently use alcohol.  Drug Use:  reports no history of drug use.    Family History:     Family History   Problem Relation Age of Onset    Other Mother         dementia    Heart Disease Father     Heart Attack Father          at 69 years old major heart attack.    Heart Disease Brother         Had a heart attack i think it was 2019    High Cholesterol Brother     Stroke Brother     Heart Attack Sister          in her sleep from heart failure    High Blood Pressure Brother        Review of Systems:       Review of Systems   Constitutional:  Negative for chills

## 2024-10-29 NOTE — PATIENT INSTRUCTIONS
SURVEY:    You may be receiving a survey from [facility] regarding your visit today.    Please complete the survey to enable us to provide the highest quality of care to you and your family.    If you cannot score us a very good (5 Stars) on any question, please call the office to discuss how we could have made your experience a very good one.    Thank you.    Clinical Care Team: MD Wilfredo Shepard LPN              Triage: Sonia Hatfield CMA              Clerical Team: Sonia Mac

## 2024-11-17 DIAGNOSIS — R25.2 LEG CRAMPS: ICD-10-CM

## 2024-11-18 RX ORDER — BACLOFEN 10 MG/1
TABLET ORAL
Qty: 360 TABLET | Refills: 1 | Status: SHIPPED | OUTPATIENT
Start: 2024-11-18

## 2024-11-21 RX ORDER — FEXOFENADINE HYDROCHLORIDE AND PSEUDOEPHEDRINE HYDROCHLORIDE 60; 120 MG/1; MG/1
1 TABLET, FILM COATED, EXTENDED RELEASE ORAL 2 TIMES DAILY
Qty: 60 TABLET | Refills: 5 | Status: SHIPPED | OUTPATIENT
Start: 2024-11-21

## 2024-11-21 RX ORDER — FEXOFENADINE HCL AND PSEUDOEPHEDRINE HCI 60; 120 MG/1; MG/1
1 TABLET, EXTENDED RELEASE ORAL 2 TIMES DAILY
Qty: 60 TABLET | Refills: 5 | OUTPATIENT
Start: 2024-11-21

## 2024-11-21 NOTE — TELEPHONE ENCOUNTER
Last OV: 10/29/2024  wrist pain   Last RX:    Next scheduled apt: 11/21/2024   6 months chronic         Surescript requesting a refill   Medication pending for approval

## 2024-11-25 RX ORDER — TAMSULOSIN HYDROCHLORIDE 0.4 MG/1
0.4 CAPSULE ORAL DAILY
Qty: 90 CAPSULE | Refills: 0 | Status: SHIPPED | OUTPATIENT
Start: 2024-11-25

## 2024-12-02 RX ORDER — VALACYCLOVIR HYDROCHLORIDE 1 G/1
TABLET, FILM COATED ORAL
Qty: 16 TABLET | Refills: 1 | Status: SHIPPED | OUTPATIENT
Start: 2024-12-02

## 2024-12-02 NOTE — TELEPHONE ENCOUNTER
Patient requesting refill via mychart  Valacyclovir 1g 2 tabs bid  Last OV 10-29-24  Next appt- 1/15/25

## 2024-12-05 ENCOUNTER — APPOINTMENT (OUTPATIENT)
Dept: CT IMAGING | Age: 64
End: 2024-12-05
Payer: MEDICARE

## 2024-12-05 ENCOUNTER — HOSPITAL ENCOUNTER (EMERGENCY)
Age: 64
Discharge: HOME | End: 2024-12-05
Attending: EMERGENCY MEDICINE
Payer: MEDICARE

## 2024-12-05 VITALS
DIASTOLIC BLOOD PRESSURE: 74 MMHG | OXYGEN SATURATION: 96 % | HEART RATE: 76 BPM | SYSTOLIC BLOOD PRESSURE: 139 MMHG | RESPIRATION RATE: 16 BRPM

## 2024-12-05 DIAGNOSIS — S09.90XA INJURY OF HEAD, INITIAL ENCOUNTER: ICD-10-CM

## 2024-12-05 DIAGNOSIS — S16.1XXA STRAIN OF NECK MUSCLE, INITIAL ENCOUNTER: Primary | ICD-10-CM

## 2024-12-05 PROCEDURE — 99284 EMERGENCY DEPT VISIT MOD MDM: CPT

## 2024-12-05 PROCEDURE — 6370000000 HC RX 637 (ALT 250 FOR IP): Performed by: PHYSICIAN ASSISTANT

## 2024-12-05 PROCEDURE — 70450 CT HEAD/BRAIN W/O DYE: CPT

## 2024-12-05 PROCEDURE — 72125 CT NECK SPINE W/O DYE: CPT

## 2024-12-05 RX ORDER — DIAZEPAM 5 MG/1
5 TABLET ORAL ONCE
Status: COMPLETED | OUTPATIENT
Start: 2024-12-05 | End: 2024-12-05

## 2024-12-05 RX ORDER — METHOCARBAMOL 750 MG/1
750 TABLET, FILM COATED ORAL 4 TIMES DAILY
Qty: 40 TABLET | Refills: 0 | Status: SHIPPED | OUTPATIENT
Start: 2024-12-05 | End: 2024-12-15

## 2024-12-05 RX ORDER — HYDROCODONE BITARTRATE AND ACETAMINOPHEN 5; 325 MG/1; MG/1
1 TABLET ORAL ONCE
Status: COMPLETED | OUTPATIENT
Start: 2024-12-05 | End: 2024-12-05

## 2024-12-05 RX ADMIN — HYDROCODONE BITARTRATE AND ACETAMINOPHEN 1 TABLET: 5; 325 TABLET ORAL at 13:33

## 2024-12-05 RX ADMIN — DIAZEPAM 5 MG: 5 TABLET ORAL at 13:33

## 2024-12-05 ASSESSMENT — PAIN SCALES - GENERAL
PAINLEVEL_OUTOF10: 7
PAINLEVEL_OUTOF10: 5

## 2024-12-05 ASSESSMENT — PAIN DESCRIPTION - ORIENTATION: ORIENTATION: POSTERIOR

## 2024-12-05 ASSESSMENT — PAIN DESCRIPTION - LOCATION: LOCATION: NECK

## 2024-12-05 ASSESSMENT — PAIN - FUNCTIONAL ASSESSMENT: PAIN_FUNCTIONAL_ASSESSMENT: 0-10

## 2024-12-05 NOTE — ED PROVIDER NOTES
[facility] EMERGENCY DEPARTMENT  EMERGENCY DEPARTMENT ENCOUNTER      Pt Name: Yovanny Akhtar  MRN: [id number]  Birthdate 1960  Date of evaluation: 12/5/2024  Provider: Richi Patel PA-C    CHIEF COMPLAINT   No chief complaint on file.      HISTORY OF PRESENT ILLNESS    Yovanny Akhtar is a 64 y.o. male who presents to the emergency department with complaints of head and neck pain after a fall.  Patient states that he slipped on the ice and fell backwards striking the back of his head as well as hurting his neck.  Patient states the pains all the mostly the right side of the neck.  Points down his right trapezius but states he does have some pain at the base of his midline posterior neck.  Patient is concerned previous neck surgery in the past.  Patient states he did hit his head as well.  Denies any loss of consciousness.  Denies any visual deficits.  Denies any vomiting.  Person presenting with the patient denies any abnormal behavior and states that he is acting normal.  Patient denies any weakness or numbness of his upper extremities.  Denies any back pain      Triage notes and Nursing notes were reviewed by myself.  Any discrepancies are addressed above.    PAST MEDICAL HISTORY     Past Medical History:   Diagnosis Date    Back pain     chronic back pain    Insomnia     Osteoarthritis     Pneumonia     walking pneumonia    Sleep apnea     Type 2 diabetes mellitus without complication (HCC)        SURGICAL HISTORY       Past Surgical History:   Procedure Laterality Date    BACK SURGERY  2000    CERVICAL FUSION  2007    COLONOSCOPY      COLONOSCOPY  05/23/2014    Dr. Bowman (Biopsies)    COLONOSCOPY  11/06/2017    DR BARKSDALE    COLONOSCOPY N/A 04/11/2023    Dr. Saldivar:  1 hyperplastic polyp    EYE SURGERY Bilateral 12/06/2023    cataract removal    KNEE CARTILAGE SURGERY Bilateral     OTHER SURGICAL HISTORY  06/2023    INSPIRE placed in neck for SLEEP APNEA    NM COLONOSCOPY W/BIOPSY SINGLE/MULTIPLE N/A

## 2025-01-08 ENCOUNTER — HOSPITAL ENCOUNTER (OUTPATIENT)
Age: 65
Discharge: HOME | End: 2025-01-08
Payer: MEDICARE

## 2025-01-08 DIAGNOSIS — E11.9 TYPE 2 DIABETES MELLITUS WITHOUT COMPLICATION, WITHOUT LONG-TERM CURRENT USE OF INSULIN (HCC): ICD-10-CM

## 2025-01-08 DIAGNOSIS — Z12.5 PROSTATE CANCER SCREENING: ICD-10-CM

## 2025-01-08 LAB
ALBUMIN SERPL-MCNC: 4.3 G/DL (ref 3.5–5.2)
ALBUMIN/GLOB SERPL: 1.7 {RATIO} (ref 1–2.5)
ALP SERPL-CCNC: 74 U/L (ref 40–129)
ALT SERPL-CCNC: 35 U/L (ref 10–50)
ANION GAP SERPL CALCULATED.3IONS-SCNC: 11 MMOL/L (ref 9–16)
AST SERPL-CCNC: 39 U/L (ref 10–50)
BILIRUB SERPL-MCNC: 0.4 MG/DL (ref 0–1.2)
BUN SERPL-MCNC: 18 MG/DL (ref 8–23)
BUN/CREAT SERPL: 23 (ref 9–20)
CALCIUM SERPL-MCNC: 8.8 MG/DL (ref 8.6–10.4)
CHLORIDE SERPL-SCNC: 106 MMOL/L (ref 98–107)
CHOLEST SERPL-MCNC: 155 MG/DL (ref 0–199)
CHOLESTEROL/HDL RATIO: 4.1
CO2 SERPL-SCNC: 25 MMOL/L (ref 20–31)
CREAT SERPL-MCNC: 0.8 MG/DL (ref 0.7–1.2)
CREAT UR-MCNC: 190 MG/DL (ref 39–259)
EST. AVERAGE GLUCOSE BLD GHB EST-MCNC: 148 MG/DL
GFR, ESTIMATED: >90 ML/MIN/1.73M2
GLUCOSE SERPL-MCNC: 101 MG/DL (ref 74–99)
HBA1C MFR BLD: 6.8 % (ref 4–6)
HDLC SERPL-MCNC: 38 MG/DL
LDLC SERPL CALC-MCNC: 100 MG/DL (ref 0–100)
MICROALBUMIN UR-MCNC: <12 MG/L (ref 0–20)
MICROALBUMIN/CREAT UR-RTO: NORMAL MCG/MG CREAT (ref 0–17)
POTASSIUM SERPL-SCNC: 3.9 MMOL/L (ref 3.7–5.3)
PROT SERPL-MCNC: 6.8 G/DL (ref 6.6–8.7)
PSA SERPL-MCNC: 0.82 NG/ML (ref 0–4)
SODIUM SERPL-SCNC: 142 MMOL/L (ref 136–145)
TRIGL SERPL-MCNC: 86 MG/DL
VLDLC SERPL CALC-MCNC: 17 MG/DL (ref 1–30)

## 2025-01-08 PROCEDURE — 82043 UR ALBUMIN QUANTITATIVE: CPT

## 2025-01-08 PROCEDURE — 36415 COLL VENOUS BLD VENIPUNCTURE: CPT

## 2025-01-08 PROCEDURE — G0103 PSA SCREENING: HCPCS

## 2025-01-08 PROCEDURE — 82570 ASSAY OF URINE CREATININE: CPT

## 2025-01-08 PROCEDURE — 83036 HEMOGLOBIN GLYCOSYLATED A1C: CPT

## 2025-01-08 PROCEDURE — 80053 COMPREHEN METABOLIC PANEL: CPT

## 2025-01-08 PROCEDURE — 80061 LIPID PANEL: CPT

## 2025-01-08 SDOH — HEALTH STABILITY: PHYSICAL HEALTH: ON AVERAGE, HOW MANY DAYS PER WEEK DO YOU ENGAGE IN MODERATE TO STRENUOUS EXERCISE (LIKE A BRISK WALK)?: 4 DAYS

## 2025-01-08 SDOH — HEALTH STABILITY: PHYSICAL HEALTH: ON AVERAGE, HOW MANY MINUTES DO YOU ENGAGE IN EXERCISE AT THIS LEVEL?: 20 MIN

## 2025-01-08 ASSESSMENT — LIFESTYLE VARIABLES
HOW MANY STANDARD DRINKS CONTAINING ALCOHOL DO YOU HAVE ON A TYPICAL DAY: 0
HOW OFTEN DO YOU HAVE SIX OR MORE DRINKS ON ONE OCCASION: 1
HOW OFTEN DO YOU HAVE A DRINK CONTAINING ALCOHOL: NEVER
HOW MANY STANDARD DRINKS CONTAINING ALCOHOL DO YOU HAVE ON A TYPICAL DAY: PATIENT DOES NOT DRINK
HOW OFTEN DO YOU HAVE A DRINK CONTAINING ALCOHOL: 1

## 2025-01-08 ASSESSMENT — PATIENT HEALTH QUESTIONNAIRE - PHQ9
SUM OF ALL RESPONSES TO PHQ QUESTIONS 1-9: 0
1. LITTLE INTEREST OR PLEASURE IN DOING THINGS: NOT AT ALL
2. FEELING DOWN, DEPRESSED OR HOPELESS: NOT AT ALL
SUM OF ALL RESPONSES TO PHQ QUESTIONS 1-9: 0
SUM OF ALL RESPONSES TO PHQ9 QUESTIONS 1 & 2: 0

## 2025-01-12 SDOH — ECONOMIC STABILITY: FOOD INSECURITY: WITHIN THE PAST 12 MONTHS, THE FOOD YOU BOUGHT JUST DIDN'T LAST AND YOU DIDN'T HAVE MONEY TO GET MORE.: NEVER TRUE

## 2025-01-12 SDOH — ECONOMIC STABILITY: TRANSPORTATION INSECURITY
IN THE PAST 12 MONTHS, HAS THE LACK OF TRANSPORTATION KEPT YOU FROM MEDICAL APPOINTMENTS OR FROM GETTING MEDICATIONS?: NO

## 2025-01-12 SDOH — ECONOMIC STABILITY: FOOD INSECURITY: WITHIN THE PAST 12 MONTHS, YOU WORRIED THAT YOUR FOOD WOULD RUN OUT BEFORE YOU GOT MONEY TO BUY MORE.: NEVER TRUE

## 2025-01-12 SDOH — ECONOMIC STABILITY: INCOME INSECURITY: IN THE LAST 12 MONTHS, WAS THERE A TIME WHEN YOU WERE NOT ABLE TO PAY THE MORTGAGE OR RENT ON TIME?: NO

## 2025-01-12 SDOH — ECONOMIC STABILITY: TRANSPORTATION INSECURITY
IN THE PAST 12 MONTHS, HAS LACK OF TRANSPORTATION KEPT YOU FROM MEETINGS, WORK, OR FROM GETTING THINGS NEEDED FOR DAILY LIVING?: NO

## 2025-01-15 ENCOUNTER — OFFICE VISIT (OUTPATIENT)
Dept: FAMILY MEDICINE CLINIC | Age: 65
End: 2025-01-15
Payer: MEDICARE

## 2025-01-15 VITALS
HEIGHT: 69 IN | OXYGEN SATURATION: 95 % | WEIGHT: 207 LBS | DIASTOLIC BLOOD PRESSURE: 72 MMHG | SYSTOLIC BLOOD PRESSURE: 114 MMHG | HEART RATE: 74 BPM | BODY MASS INDEX: 30.66 KG/M2

## 2025-01-15 DIAGNOSIS — K21.9 GASTROESOPHAGEAL REFLUX DISEASE WITHOUT ESOPHAGITIS: ICD-10-CM

## 2025-01-15 DIAGNOSIS — J30.89 SEASONAL ALLERGIC RHINITIS DUE TO OTHER ALLERGIC TRIGGER: ICD-10-CM

## 2025-01-15 DIAGNOSIS — Z00.00 MEDICARE ANNUAL WELLNESS VISIT, SUBSEQUENT: Primary | ICD-10-CM

## 2025-01-15 DIAGNOSIS — E11.9 TYPE 2 DIABETES MELLITUS WITHOUT COMPLICATION, WITHOUT LONG-TERM CURRENT USE OF INSULIN (HCC): ICD-10-CM

## 2025-01-15 DIAGNOSIS — R07.81 RIB PAIN ON RIGHT SIDE: ICD-10-CM

## 2025-01-15 DIAGNOSIS — G47.09 OTHER INSOMNIA: ICD-10-CM

## 2025-01-15 PROCEDURE — 99214 OFFICE O/P EST MOD 30 MIN: CPT | Performed by: FAMILY MEDICINE

## 2025-01-15 PROCEDURE — 3044F HG A1C LEVEL LT 7.0%: CPT | Performed by: FAMILY MEDICINE

## 2025-01-15 PROCEDURE — G0439 PPPS, SUBSEQ VISIT: HCPCS | Performed by: FAMILY MEDICINE

## 2025-01-15 RX ORDER — METHOCARBAMOL 750 MG/1
750 TABLET, FILM COATED ORAL 3 TIMES DAILY PRN
Qty: 42 TABLET | Refills: 0 | Status: SHIPPED | OUTPATIENT
Start: 2025-01-15 | End: 2025-01-29

## 2025-01-15 ASSESSMENT — PATIENT HEALTH QUESTIONNAIRE - PHQ9
1. LITTLE INTEREST OR PLEASURE IN DOING THINGS: NOT AT ALL
SUM OF ALL RESPONSES TO PHQ QUESTIONS 1-9: 0
3. TROUBLE FALLING OR STAYING ASLEEP: NOT AT ALL
7. TROUBLE CONCENTRATING ON THINGS, SUCH AS READING THE NEWSPAPER OR WATCHING TELEVISION: NOT AT ALL
10. IF YOU CHECKED OFF ANY PROBLEMS, HOW DIFFICULT HAVE THESE PROBLEMS MADE IT FOR YOU TO DO YOUR WORK, TAKE CARE OF THINGS AT HOME, OR GET ALONG WITH OTHER PEOPLE: NOT DIFFICULT AT ALL
5. POOR APPETITE OR OVEREATING: NOT AT ALL
6. FEELING BAD ABOUT YOURSELF - OR THAT YOU ARE A FAILURE OR HAVE LET YOURSELF OR YOUR FAMILY DOWN: NOT AT ALL
SUM OF ALL RESPONSES TO PHQ QUESTIONS 1-9: 0
SUM OF ALL RESPONSES TO PHQ9 QUESTIONS 1 & 2: 0
9. THOUGHTS THAT YOU WOULD BE BETTER OFF DEAD, OR OF HURTING YOURSELF: NOT AT ALL
SUM OF ALL RESPONSES TO PHQ QUESTIONS 1-9: 0
4. FEELING TIRED OR HAVING LITTLE ENERGY: NOT AT ALL
SUM OF ALL RESPONSES TO PHQ QUESTIONS 1-9: 0
2. FEELING DOWN, DEPRESSED OR HOPELESS: NOT AT ALL
8. MOVING OR SPEAKING SO SLOWLY THAT OTHER PEOPLE COULD HAVE NOTICED. OR THE OPPOSITE, BEING SO FIGETY OR RESTLESS THAT YOU HAVE BEEN MOVING AROUND A LOT MORE THAN USUAL: NOT AT ALL

## 2025-01-15 ASSESSMENT — ENCOUNTER SYMPTOMS
CHOKING: 0
NAUSEA: 0
EYE DISCHARGE: 0
EYE REDNESS: 0
DIARRHEA: 0
VOMITING: 0
CONSTIPATION: 0
BLOOD IN STOOL: 0
ABDOMINAL PAIN: 0
SHORTNESS OF BREATH: 0
SORE THROAT: 0
COUGH: 0
TROUBLE SWALLOWING: 0

## 2025-01-15 NOTE — PROGRESS NOTES
Take no more than 1 tablet daily.  Take 30 mins before desired sexual activity Yes ProviderBrenda MD   blood glucose test strips (EXACTECH TEST) strip Pt testing blood sugar one daily and as needed Yes Heather Licona MD   celecoxib (CELEBREX) 100 MG capsule Take 1 capsule by mouth 2 times daily  Heather Licona MD   ibuprofen (ADVIL;MOTRIN) 800 MG tablet Take 1 tablet by mouth 3 times daily as needed for Pain  Patient not taking: Reported on 10/29/2024  Heather Licona MD   Naproxen Sodium 220 MG CAPS Take 1 tablet by mouth 2 times daily as needed for Pain.  Patient not taking: Reported on 7/10/2024  ProviderBrenda MD       CareTeam (Including outside providers/suppliers regularly involved in providing care):   Patient Care Team:  Heather Licona MD as PCP - General (Family Medicine)  Heather Licona MD as PCP - Empaneled Provider     Recommendations for Preventive Services Due: see orders and patient instructions/AVS.  Recommended screening schedule for the next 5-10 years is provided to the patient in written form: see Patient Instructions/AVS.     Reviewed and updated this visit:  Tobacco  Allergies  Meds  Problems  Med Hx  Surg Hx  Soc Hx  Fam Hx              
pain, blood in stool, constipation, diarrhea, nausea and vomiting.   Genitourinary:  Negative for dysuria and hematuria.   Musculoskeletal:  Negative for joint swelling and neck pain.   Skin:  Negative for rash.   Neurological:  Negative for dizziness, tremors, light-headedness and headaches.   Psychiatric/Behavioral:  The patient has insomnia.          Physical Exam:     Physical Exam  Vitals reviewed.   Constitutional:       General: He is not in acute distress.     Appearance: Normal appearance. He is well-developed. He is not ill-appearing.   HENT:      Head: Normocephalic and atraumatic.   Eyes:      General:         Right eye: No discharge.         Left eye: No discharge.   Neck:      Thyroid: No thyromegaly.      Vascular: No carotid bruit.   Cardiovascular:      Rate and Rhythm: Normal rate and regular rhythm.      Heart sounds: Normal heart sounds. No murmur heard.  Pulmonary:      Effort: Pulmonary effort is normal. No respiratory distress.      Breath sounds: Normal breath sounds. No wheezing or rhonchi.   Chest:      Chest wall: Swelling and tenderness present. No mass, deformity, crepitus or edema. There is no dullness to percussion.   Abdominal:      General: There is no distension.      Palpations: Abdomen is soft.      Tenderness: There is no abdominal tenderness.   Musculoskeletal:      Cervical back: Neck supple.        Back:       Right lower leg: No edema.      Left lower leg: No edema.      Comments: A - Rt posterior mid rib pain -- tender to palpate and around Rt anterior rib pain. No mass. Pt has tenderness with twisting and bending of Rt side   Skin:     Findings: No erythema or rash.   Neurological:      Mental Status: He is alert and oriented to person, place, and time.   Psychiatric:         Mood and Affect: Mood normal.         Behavior: Behavior normal.         Vitals:  /72   Pulse 74   Ht 1.753 m (5' 9\")   Wt 93.9 kg (207 lb)   SpO2 95%   BMI 30.57 kg/m²       Data:     Lab

## 2025-01-16 ENCOUNTER — HOSPITAL ENCOUNTER (OUTPATIENT)
Age: 65
Discharge: HOME | End: 2025-01-18
Payer: MEDICARE

## 2025-01-16 ENCOUNTER — HOSPITAL ENCOUNTER (OUTPATIENT)
Dept: GENERAL RADIOLOGY | Age: 65
Discharge: HOME | End: 2025-01-18
Payer: MEDICARE

## 2025-01-16 DIAGNOSIS — R07.81 RIB PAIN ON RIGHT SIDE: ICD-10-CM

## 2025-01-16 PROCEDURE — 71101 X-RAY EXAM UNILAT RIBS/CHEST: CPT

## 2025-01-16 RX ORDER — MONTELUKAST SODIUM 10 MG/1
10 TABLET ORAL NIGHTLY
Qty: 30 TABLET | Refills: 5 | Status: SHIPPED | OUTPATIENT
Start: 2025-01-16

## 2025-01-16 NOTE — TELEPHONE ENCOUNTER
Rx refill request via [employer]  Montelukast 10mg qd  Last OV AWV 1/15/25  Next appt- 7-16-25

## 2025-01-22 RX ORDER — CELECOXIB 100 MG/1
100 CAPSULE ORAL 2 TIMES DAILY
Qty: 60 CAPSULE | Refills: 2 | Status: SHIPPED | OUTPATIENT
Start: 2025-01-22

## 2025-01-27 ENCOUNTER — PATIENT MESSAGE (OUTPATIENT)
Dept: FAMILY MEDICINE CLINIC | Age: 65
End: 2025-01-27

## 2025-01-27 DIAGNOSIS — R07.81 RIB PAIN ON RIGHT SIDE: Primary | ICD-10-CM

## 2025-01-27 RX ORDER — LANCETS 30 GAUGE
EACH MISCELLANEOUS
Qty: 100 EACH | Refills: 1 | Status: SHIPPED | OUTPATIENT
Start: 2025-01-27

## 2025-01-27 NOTE — TELEPHONE ENCOUNTER
Please call pt and state the only other test if not better is to try a CT chest BUT  I am not sure insurance will cover without trying some Physical therapy but I can order CT just wait to schedule for 1wk and make sure insurance covers.

## 2025-01-27 NOTE — TELEPHONE ENCOUNTER
Last OV 1/15/25 for AWV and chronics  Requesting refill on lancets via sure script  Next OV 7/16/25

## 2025-02-02 ENCOUNTER — PATIENT MESSAGE (OUTPATIENT)
Dept: FAMILY MEDICINE CLINIC | Age: 65
End: 2025-02-02

## 2025-02-03 RX ORDER — METHOCARBAMOL 750 MG/1
750 TABLET, FILM COATED ORAL 3 TIMES DAILY PRN
Qty: 60 TABLET | Refills: 1 | Status: SHIPPED | OUTPATIENT
Start: 2025-02-03

## 2025-02-03 NOTE — TELEPHONE ENCOUNTER
Spoke with patient and he wants the robaxin    Rx pending for robaxin 750 mg one tid prn, #42 with 1 refill to [facility]

## 2025-02-03 NOTE — TELEPHONE ENCOUNTER
Tell pt that he may stay on the robaxin if helping better then the baclofen. I can fill for more robaxin if needed. Also is he any better or getting the CT??

## 2025-02-04 ENCOUNTER — TELEPHONE (OUTPATIENT)
Dept: FAMILY MEDICINE CLINIC | Age: 65
End: 2025-02-04

## 2025-02-04 ENCOUNTER — HOSPITAL ENCOUNTER (OUTPATIENT)
Dept: CT IMAGING | Age: 65
Discharge: HOME | End: 2025-02-06
Attending: FAMILY MEDICINE
Payer: MEDICARE

## 2025-02-04 DIAGNOSIS — R07.81 RIB PAIN ON RIGHT SIDE: ICD-10-CM

## 2025-02-04 DIAGNOSIS — K80.20 GALLSTONES: Primary | ICD-10-CM

## 2025-02-04 PROCEDURE — 71250 CT THORAX DX C-: CPT

## 2025-02-04 NOTE — TELEPHONE ENCOUNTER
----- Message from Dr. Heather Licona MD sent at 2/4/2025 11:39 AM EST -----  Tell pt his CT scan:  NO rib fracture and lungs all clear. No lymph nodes swollen in chest  But does show several tiny gallstones in the gallbladder --- but gallbladder NOT thickened or dilated.   So not sure if the pain may be from gallbladder but that is usually NOT constant and comes with some nausea or worse after eating but the scan shows nothing else musculoskeletal. So he could go talk to Dr Vanegas the [facility] surgeon to see if gallbladder

## 2025-02-04 NOTE — TELEPHONE ENCOUNTER
Patient notified of ct results and recommendations.  Patient would like to see surgeon to see if GB causing pain, he is very uncomfortable  Denies vomiting or nausea    Referral pending

## 2025-02-10 ENCOUNTER — HOSPITAL ENCOUNTER (OUTPATIENT)
Age: 65
Discharge: HOME | End: 2025-02-10
Payer: MEDICARE

## 2025-02-10 LAB
ERYTHROCYTE [DISTWIDTH] IN BLOOD BY AUTOMATED COUNT: 13.4 % (ref 11.8–14.4)
HCT VFR BLD AUTO: 41.2 % (ref 40.7–50.3)
HGB BLD-MCNC: 13.7 G/DL (ref 13–17)
LH SERPL-ACNC: 12.4 MIU/ML (ref 1.7–8.6)
MCH RBC QN AUTO: 29.8 PG (ref 25.2–33.5)
MCHC RBC AUTO-ENTMCNC: 33.3 G/DL (ref 28.4–34.8)
MCV RBC AUTO: 89.8 FL (ref 82.6–102.9)
NRBC BLD-RTO: 0 PER 100 WBC
PLATELET # BLD AUTO: 131 K/UL (ref 138–453)
PMV BLD AUTO: 12.4 FL (ref 8.1–13.5)
RBC # BLD AUTO: 4.59 M/UL (ref 4.21–5.77)
SHBG SERPL-SCNC: 33 NMOL/L (ref 19–76)
TESTOST FREE MFR SERPL: 73.6 PG/ML (ref 47–244)
TESTOST SERPL-MCNC: 366 NG/DL (ref 193–740)
WBC OTHER # BLD: 5.1 K/UL (ref 3.5–11.3)

## 2025-02-10 PROCEDURE — 36415 COLL VENOUS BLD VENIPUNCTURE: CPT

## 2025-02-10 PROCEDURE — 84403 ASSAY OF TOTAL TESTOSTERONE: CPT

## 2025-02-10 PROCEDURE — 85027 COMPLETE CBC AUTOMATED: CPT

## 2025-02-10 PROCEDURE — 84270 ASSAY OF SEX HORMONE GLOBUL: CPT

## 2025-02-10 PROCEDURE — 83002 ASSAY OF GONADOTROPIN (LH): CPT

## 2025-02-20 ENCOUNTER — OFFICE VISIT (OUTPATIENT)
Dept: SURGERY | Age: 65
End: 2025-02-20
Payer: MEDICARE

## 2025-02-20 VITALS
HEART RATE: 70 BPM | BODY MASS INDEX: 32.49 KG/M2 | SYSTOLIC BLOOD PRESSURE: 136 MMHG | DIASTOLIC BLOOD PRESSURE: 84 MMHG | WEIGHT: 220 LBS

## 2025-02-20 DIAGNOSIS — K80.50 CALCULUS OF BILE DUCT WITHOUT CHOLECYSTITIS AND WITHOUT OBSTRUCTION: Primary | ICD-10-CM

## 2025-02-20 PROCEDURE — 99202 OFFICE O/P NEW SF 15 MIN: CPT | Performed by: SURGERY

## 2025-02-20 NOTE — PROGRESS NOTES
Fall December 5    About 3 weeks later he had acute onset of mid thoracic back and rib pain.  CT was done as part of his evaluation and incidentally found gall stones.   He did have a little nausea.  Started on Robaxin and ibuprofen which did seem to help.  Pain has largely resolved after about a month.    Does not believe food affects the pain.  No food that he avoids.    No episodes that sound like biliary colic.    Past Medical History:   Diagnosis Date    Back pain     chronic back pain    Insomnia     Osteoarthritis     Pneumonia     walking pneumonia    Sleep apnea     Type 2 diabetes mellitus without complication (HCC)      Past Surgical History:   Procedure Laterality Date    BACK SURGERY  2000    CERVICAL FUSION  2007    COLONOSCOPY      COLONOSCOPY  05/23/2014    Dr. Bowman (Biopsies)    COLONOSCOPY  11/06/2017    DR BARKSDALE    COLONOSCOPY N/A 04/11/2023     Back:  1 hyperplastic polyp    EYE SURGERY Bilateral 12/06/2023    cataract removal    KNEE CARTILAGE SURGERY Bilateral     OTHER SURGICAL HISTORY  06/2023    INSPIRE placed in neck for SLEEP APNEA    [place] COLONOSCOPY W/BIOPSY SINGLE/MULTIPLE N/A 11/06/2017    COLONOSCOPY WITH BIOPSY/ polypectomies performed by Tam Barksdale MD at [facility] OR     Current Outpatient Medications   Medication Sig Dispense Refill    methocarbamol (ROBAXIN-750) 750 MG tablet Take 1 tablet by mouth 3 times daily as needed (for rib pain) 60 tablet 1    Lancets (ONETOUCH DELICA PLUS [id number]) MISC USE 1 LANCET TO CHECK GLUCOSE TWICE DAILY 100 each 1    celecoxib (CELEBREX) 100 MG capsule Take 1 capsule by mouth twice daily 60 capsule 2    montelukast (SINGULAIR) 10 MG tablet Take 1 tablet by mouth nightly 30 tablet 5    valACYclovir (VALTREX) 1 g tablet TAKE 2 TABLETS BY MOUTH TWICE DAILY FOR 2 DAYS AT THE ONSET OF THE COLD SORE 16 tablet 1    tamsulosin (FLOMAX) 0.4 MG capsule Take 1 capsule by mouth once daily 90 capsule 0    ALLEGRA-D ALLERGY & CONGESTION  MG per

## 2025-02-20 NOTE — PATIENT INSTRUCTIONS
SURVEY:    You may be receiving a survey from [facility] regarding your visit today.    You may get this in the mail, through your MyChart, or in your email.     Please complete the survey to enable us to provide the highest quality of care to you and your family.    If you cannot score us a very good (5 Stars) on any question, please call the office to discuss how we could of made your experience exceptional.    Thank you!    General Surgery    MD Dr. Holly Barron, DO Dr. Bryan Vargas, LYDIA Moreau-CNP    Pain Mgmt.  Dr. Matthew Mccarthy, LARRY Hu LPN Brenda Boehler, LPN Jena Adams, MA Emily Akers, MA    Phone: [phone]  Fax: [phone]    Office Hours:   M-TH 8-5, F: 8-12

## 2025-02-24 RX ORDER — TAMSULOSIN HYDROCHLORIDE 0.4 MG/1
0.4 CAPSULE ORAL DAILY
Qty: 90 CAPSULE | Refills: 0 | Status: SHIPPED | OUTPATIENT
Start: 2025-02-24

## 2025-02-24 NOTE — TELEPHONE ENCOUNTER
Rx refill request via surescripts  Tamsulosin 0.4mg qd  Last OV 1/15/25 for AWV  Next appt- 7-16-25

## 2025-03-10 ENCOUNTER — APPOINTMENT (OUTPATIENT)
Dept: GENERAL RADIOLOGY | Age: 65
End: 2025-03-10
Payer: MEDICARE

## 2025-03-10 ENCOUNTER — HOSPITAL ENCOUNTER (EMERGENCY)
Age: 65
Discharge: HOME | End: 2025-03-10
Attending: EMERGENCY MEDICINE
Payer: MEDICARE

## 2025-03-10 VITALS
HEIGHT: 69 IN | SYSTOLIC BLOOD PRESSURE: 146 MMHG | DIASTOLIC BLOOD PRESSURE: 85 MMHG | HEART RATE: 87 BPM | RESPIRATION RATE: 18 BRPM | OXYGEN SATURATION: 95 % | TEMPERATURE: 98 F | BODY MASS INDEX: 31.4 KG/M2 | WEIGHT: 212 LBS

## 2025-03-10 DIAGNOSIS — S09.90XA INJURY OF HEAD, INITIAL ENCOUNTER: ICD-10-CM

## 2025-03-10 DIAGNOSIS — S01.01XA LACERATION OF SCALP, INITIAL ENCOUNTER: ICD-10-CM

## 2025-03-10 DIAGNOSIS — S43.402A SPRAIN OF LEFT SHOULDER, UNSPECIFIED SHOULDER SPRAIN TYPE, INITIAL ENCOUNTER: ICD-10-CM
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
## 2025-03-10 PROCEDURE — 73030 X-RAY EXAM OF SHOULDER: CPT

## 2025-03-10 PROCEDURE — 99283 EMERGENCY DEPT VISIT LOW MDM: CPT

## 2025-03-10 RX ORDER — NAPROXEN 375 MG/1
375 TABLET ORAL 2 TIMES DAILY WITH MEALS
Qty: 20 TABLET | Refills: 0 | Status: SHIPPED | OUTPATIENT
Start: 2025-03-10

## 2025-03-10 ASSESSMENT — PAIN DESCRIPTION - PAIN TYPE: TYPE: ACUTE PAIN

## 2025-03-10 ASSESSMENT — LIFESTYLE VARIABLES
HOW MANY STANDARD DRINKS CONTAINING ALCOHOL DO YOU HAVE ON A TYPICAL DAY: PATIENT DOES NOT DRINK
HOW OFTEN DO YOU HAVE A DRINK CONTAINING ALCOHOL: NEVER

## 2025-03-10 ASSESSMENT — PAIN DESCRIPTION - LOCATION: LOCATION: HEAD

## 2025-03-10 ASSESSMENT — PAIN SCALES - GENERAL: PAINLEVEL_OUTOF10: 4

## 2025-03-10 ASSESSMENT — PAIN DESCRIPTION - ORIENTATION: ORIENTATION: RIGHT

## 2025-03-10 ASSESSMENT — PAIN - FUNCTIONAL ASSESSMENT: PAIN_FUNCTIONAL_ASSESSMENT: 0-10

## 2025-03-10 ASSESSMENT — PAIN DESCRIPTION - DESCRIPTORS: DESCRIPTORS: BURNING

## 2025-03-10 ASSESSMENT — PAIN DESCRIPTION - FREQUENCY: FREQUENCY: CONTINUOUS

## 2025-03-10 NOTE — ED PROVIDER NOTES
eMERGENCY dEPARTMENT eNCOUnter        CHIEF COMPLAINT    Chief Complaint   Patient presents with    Fall     Patient states he fell unloading something from truck. States it was about 3 feet. Hit right knee, right elbow, right side of head. Denies LOC, No blood thinners.       HPI    Yovanny Akhtar is a 65 y.o. male who presents to ED after fall.  Patient fell down few steps.  Patient twisted his left arm.  Patient complains of left shoulder pain.  Patient has forehead abrasions.  Patient denies loss of consciousness.  Patient denies headache.  Patient is not on anticoagulation.  REVIEW OF SYSTEMS    All systems reviewed and positives are in the HPI      PAST MEDICAL HISTORY    Past Medical History:   Diagnosis Date    Back pain     chronic back pain    Insomnia     Osteoarthritis     Pneumonia     walking pneumonia    Sleep apnea     Type 2 diabetes mellitus without complication (HCC)        SURGICAL HISTORY    Past Surgical History:   Procedure Laterality Date    BACK SURGERY  2000    CERVICAL FUSION  2007    COLONOSCOPY      COLONOSCOPY  05/23/2014    Dr. Bowman (Biopsies)    COLONOSCOPY  11/06/2017    DR BARKSDALE    COLONOSCOPY N/A 04/11/2023     Back:  1 hyperplastic polyp    EYE SURGERY Bilateral 12/06/2023    cataract removal    KNEE CARTILAGE SURGERY Bilateral     OTHER SURGICAL HISTORY  06/2023    INSPIRE placed in neck for SLEEP APNEA    [place] COLONOSCOPY W/BIOPSY SINGLE/MULTIPLE N/A 11/06/2017    COLONOSCOPY WITH BIOPSY/ polypectomies performed by Tam Barksdale MD at [facility] OR       CURRENT MEDICATIONS    Current Outpatient Rx   Medication Sig Dispense Refill    naproxen (NAPROSYN) 375 MG tablet Take 1 tablet by mouth 2 times daily (with meals) 20 tablet 0    tamsulosin (FLOMAX) 0.4 MG capsule Take 1 capsule by mouth once daily 90 capsule 0    Lancets (ONETOUCH DELICA PLUS [id number]) MISC USE 1 LANCET TO CHECK GLUCOSE TWICE DAILY 100 each 1    montelukast (SINGULAIR) 10 MG tablet Take 1 tablet by

## 2025-03-23 ENCOUNTER — TRANSCRIBE ORDERS (OUTPATIENT)
Dept: ADMINISTRATIVE | Age: 65
End: 2025-03-23

## 2025-03-23 DIAGNOSIS — M19.012 PRIMARY OSTEOARTHRITIS, LEFT SHOULDER: Primary | ICD-10-CM

## 2025-03-23 DIAGNOSIS — M54.16 LUMBAR RADICULOPATHY: Primary | ICD-10-CM

## 2025-04-02 ENCOUNTER — PATIENT MESSAGE (OUTPATIENT)
Dept: FAMILY MEDICINE CLINIC | Age: 65
End: 2025-04-02

## 2025-04-02 DIAGNOSIS — R25.2 LEG CRAMPS: ICD-10-CM

## 2025-04-02 RX ORDER — BACLOFEN 10 MG/1
TABLET ORAL
Qty: 360 TABLET | Refills: 1 | Status: SHIPPED | OUTPATIENT
Start: 2025-04-02

## 2025-04-02 NOTE — TELEPHONE ENCOUNTER
Last OV: 1/15/2025  AWV rib pain,   01/10/24  bilateral lower back pain   Last RX:    Next scheduled apt: 7/16/2025   6 months chronic visit       Pt requesting a refill   Medication  pending for approval     Pt stated   I need a new script for this unless you want me to go back to taking baclofen. Thanks

## 2025-04-08 ENCOUNTER — HOSPITAL ENCOUNTER (OUTPATIENT)
Dept: MRI IMAGING | Age: 65
Discharge: HOME | End: 2025-04-10
Attending: ORTHOPAEDIC SURGERY
Payer: MEDICARE

## 2025-04-08 DIAGNOSIS — M19.012 PRIMARY OSTEOARTHRITIS, LEFT SHOULDER: ICD-10-CM

## 2025-04-08 DIAGNOSIS — M54.16 LUMBAR RADICULOPATHY: ICD-10-CM

## 2025-04-08 PROCEDURE — 73221 MRI JOINT UPR EXTREM W/O DYE: CPT

## 2025-04-08 PROCEDURE — 72148 MRI LUMBAR SPINE W/O DYE: CPT

## 2025-05-05 RX ORDER — LANCETS 30 GAUGE
EACH MISCELLANEOUS
Qty: 100 EACH | Refills: 0 | Status: SHIPPED | OUTPATIENT
Start: 2025-05-05

## 2025-05-06 PROBLEM — M48.061 DEGENERATIVE LUMBAR SPINAL STENOSIS: Status: ACTIVE | Noted: 2025-05-06

## 2025-05-13 ENCOUNTER — OFFICE VISIT (OUTPATIENT)
Dept: FAMILY MEDICINE CLINIC | Age: 65
End: 2025-05-13
Payer: MEDICARE

## 2025-05-13 VITALS
BODY MASS INDEX: 31.16 KG/M2 | WEIGHT: 211 LBS | HEART RATE: 70 BPM | SYSTOLIC BLOOD PRESSURE: 130 MMHG | DIASTOLIC BLOOD PRESSURE: 80 MMHG | OXYGEN SATURATION: 96 %

## 2025-05-13 DIAGNOSIS — M54.41 CHRONIC RIGHT-SIDED LOW BACK PAIN WITH RIGHT-SIDED SCIATICA: ICD-10-CM

## 2025-05-13 DIAGNOSIS — Z01.818 PRE-OPERATIVE CLEARANCE: Primary | ICD-10-CM

## 2025-05-13 DIAGNOSIS — G89.29 CHRONIC RIGHT-SIDED LOW BACK PAIN WITH RIGHT-SIDED SCIATICA: ICD-10-CM

## 2025-05-13 PROCEDURE — 99213 OFFICE O/P EST LOW 20 MIN: CPT | Performed by: FAMILY MEDICINE

## 2025-05-13 RX ORDER — HYDROCODONE BITARTRATE AND ACETAMINOPHEN 5; 325 MG/1; MG/1
1 TABLET ORAL EVERY 8 HOURS PRN
COMMUNITY
Start: 2025-04-02 | End: 2025-05-13 | Stop reason: ALTCHOICE

## 2025-05-13 ASSESSMENT — ENCOUNTER SYMPTOMS
ABDOMINAL PAIN: 0
BLOOD IN STOOL: 0
NAUSEA: 0
EYE DISCHARGE: 0
BACK PAIN: 1
COUGH: 0
EYE REDNESS: 0
VOMITING: 0
SHORTNESS OF BREATH: 0
TROUBLE SWALLOWING: 0
DIARRHEA: 0

## 2025-05-13 NOTE — PATIENT INSTRUCTIONS
SURVEY:    You may be receiving a survey from [employer] regarding your visit today.    Please complete the survey to enable us to provide the highest quality of care to you and your family.      Thank you.    Clinical Care Team: MD Wilfredo Shepard LPN              Triage: Sonia Hatfield CMA              Clerical Team: Sonia Mac

## 2025-05-13 NOTE — PROGRESS NOTES
taking: Reported on 2025 3/4/24   Heather Licona MD        Allergies:       Darvocet a500 [propoxyphene n-acetaminophen]    Social History:     Tobacco:    reports that he quit smoking about 22 years ago. His smoking use included cigarettes. He started smoking about 49 years ago. He has a 40.7 pack-year smoking history. He has been exposed to tobacco smoke. He quit smokeless tobacco use about 9 years ago.  His smokeless tobacco use included chew.  Alcohol:      reports that he does not currently use alcohol.  Drug Use:  reports no history of drug use.    Family History:     Family History   Problem Relation Age of Onset    Other Mother         dementia    Heart Disease Father     Heart Attack Father          at 69 years old major heart attack.    Heart Disease Brother         Had a heart attack i think it was 2019    High Cholesterol Brother     Stroke Brother     Heart Attack Sister          in her sleep from heart failure    High Blood Pressure Brother        Review of Systems:       Review of Systems   Constitutional:  Negative for chills, fatigue and fever.   HENT:  Negative for trouble swallowing.    Eyes:  Negative for discharge, redness and visual disturbance.   Respiratory:  Negative for cough and shortness of breath.    Cardiovascular:  Negative for chest pain and palpitations.   Gastrointestinal:  Negative for abdominal pain, blood in stool, diarrhea, nausea and vomiting.   Genitourinary:  Negative for dysuria and hematuria.   Musculoskeletal:  Positive for back pain.        Back pain radiates into the Rt leg   Skin:  Negative for rash.   Neurological:  Negative for dizziness, light-headedness and headaches.         Physical Exam:     Physical Exam  Vitals reviewed.   Constitutional:       General: He is not in acute distress.     Appearance: Normal appearance. He is well-developed. He is not ill-appearing.   HENT:      Head: Normocephalic and atraumatic.   Eyes:      General:         Right

## 2025-05-23 RX ORDER — TAMSULOSIN HYDROCHLORIDE 0.4 MG/1
0.4 CAPSULE ORAL DAILY
Qty: 90 CAPSULE | Refills: 1 | Status: SHIPPED | OUTPATIENT
Start: 2025-05-23

## 2025-05-23 NOTE — TELEPHONE ENCOUNTER
Last OV: 5/13/2025  pre op clearance  10/06/21 BPH   Last RX:    Next scheduled apt: 7/16/2025  6 MONTHS           Surescript requesting a refill   Medication pending for approval

## 2025-05-24 DIAGNOSIS — E11.9 TYPE 2 DIABETES MELLITUS WITHOUT COMPLICATION, WITHOUT LONG-TERM CURRENT USE OF INSULIN (HCC): ICD-10-CM

## 2025-05-27 RX ORDER — BLOOD SUGAR DIAGNOSTIC
STRIP MISCELLANEOUS
Qty: 100 EACH | Refills: 3 | Status: SHIPPED | OUTPATIENT
Start: 2025-05-27

## 2025-05-29 RX ORDER — FEXOFENADINE HCL AND PSEUDOEPHEDRINE HCI 60; 120 MG/1; MG/1
1 TABLET, EXTENDED RELEASE ORAL 2 TIMES DAILY
Qty: 60 TABLET | Refills: 5 | Status: SHIPPED | OUTPATIENT
Start: 2025-05-29

## 2025-05-29 NOTE — TELEPHONE ENCOUNTER
Last visit:  5/13/2025  Next Visit Date:    Future Appointments   Date Time Provider Department Center   6/2/2025 10:00 AM [facility] XRAY [facility]OM [facility] RAD[facility]OLOG   7/7/2025  8:00 AM Yamile Felix Jr., MD [facility] ORTH [facility] [place]   7/16/2025  7:00 AM Heather Licona MD [facility] ECC DEP         Medication List:  Prior to Admission medications    Medication Sig Start Date End Date Taking? Authorizing Provider   blood glucose test strips (ONETOUCH ULTRA) strip USE  TO CHECK SUGAR ONCE DAILY 5/27/25   Heather Licona MD   tamsulosin (FLOMAX) 0.4 MG capsule Take 1 capsule by mouth once daily 5/23/25   Heather Licona MD   gabapentin (NEURONTIN) 300 MG capsule Take 1 capsule by mouth 3 times daily for 30 days. 5/8/25 6/7/25  Yamile Felix Jr., MD   Lancets (ONETOUCH DELICA PLUS [id number]) MISC USE 1 LANCET TO CHECK GLUCOSE TWICE DAILY 5/5/25   Heather Licona MD   baclofen (LIORESAL) 10 MG tablet TAKE 2 TABLETS BY MOUTH IN THE MORNING AND 2 TABLETS IN THE EVENING 4/2/25   Heather Licona MD   montelukast (SINGULAIR) 10 MG tablet Take 1 tablet by mouth nightly 1/16/25   Heather Licona MD   valACYclovir (VALTREX) 1 g tablet TAKE 2 TABLETS BY MOUTH TWICE DAILY FOR 2 DAYS AT THE ONSET OF THE COLD SORE 12/2/24   Heather Licona MD   ALLEGRA-D ALLERGY & CONGESTION  MG per extended release tablet Take 1 tablet by mouth twice daily 11/21/24   Heather Licona MD   traZODone (DESYREL) 100 MG tablet Take 2 tablets by mouth nightly as needed for Sleep  Patient not taking: Reported on 5/13/2025 7/10/24   Heather Licona MD   pantoprazole (PROTONIX) 40 MG tablet Take 1 tablet by mouth daily 5/28/24   Heather Licona MD   ibuprofen (ADVIL;MOTRIN) 800 MG tablet Take 1 tablet by mouth 3 times daily as needed for Pain  Patient not taking: Reported on 5/13/2025 3/4/24   Heather Licona MD   Blood Glucose Monitoring Suppl (ONE TOUCH ULTRA 2) w/Device KIT Test sugar once daily 6/2/23   Livan,  MEDICATIONS  (PRN):  acetaminophen   Tablet .. 650 milliGRAM(s) Oral every 6 hours PRN Mild Pain (1 - 3), Moderate Pain (4 - 6)  chlorproMAZINE    Injectable 100 milliGRAM(s) IntraMuscular once PRN agitation or aggression  chlorproMAZINE    Tablet 100 milliGRAM(s) Oral every 4 hours PRN agitation or aggression  gabapentin 300 milliGRAM(s) Oral four times a day PRN anxiety  haloperidol     Tablet 5 milliGRAM(s) Oral every 6 hours PRN agitation  haloperidol    Injectable 5 milliGRAM(s) IntraMuscular once PRN agitation  melatonin. 5 milliGRAM(s) Oral at bedtime PRN Insomnia  polyethylene glycol 3350 17 Gram(s) Oral daily PRN constipation

## 2025-06-02 PROBLEM — K21.9 GERD (GASTROESOPHAGEAL REFLUX DISEASE): Status: ACTIVE | Noted: 2025-06-02

## 2025-06-02 PROBLEM — N40.0 BPH (BENIGN PROSTATIC HYPERPLASIA): Status: ACTIVE | Noted: 2025-06-02

## 2025-06-23 DIAGNOSIS — E11.9 TYPE 2 DIABETES MELLITUS WITHOUT COMPLICATION, WITHOUT LONG-TERM CURRENT USE OF INSULIN (HCC): ICD-10-CM

## 2025-06-23 RX ORDER — BLOOD SUGAR DIAGNOSTIC
STRIP MISCELLANEOUS
Qty: 100 EACH | Refills: 3 | Status: SHIPPED | OUTPATIENT
Start: 2025-06-23

## 2025-06-23 NOTE — TELEPHONE ENCOUNTER
Last visit:  5/13/2025 (pre op)  Next Visit Date:    Future Appointments   Date Time Provider Department Center   6/30/2025  8:00 AM Yamile Felix Jr., MD [facility] ORTH [facility] [place]   7/16/2025  7:00 AM Heather Licona MD [facility] ECC DEP         Medication List:  Prior to Admission medications    Medication Sig Start Date End Date Taking? Authorizing Provider   fexofenadine-pseudoephedrine (ALLEGRA-D ALLERGY & CONGESTION)  MG per extended release tablet Take 1 tablet by mouth 2 times daily 5/29/25   Heather Licona MD   blood glucose test strips (ONETOUCH ULTRA) strip USE  TO CHECK SUGAR ONCE DAILY 5/27/25   Heather Licona MD   tamsulosin (FLOMAX) 0.4 MG capsule Take 1 capsule by mouth once daily 5/23/25   Heather Licona MD   gabapentin (NEURONTIN) 300 MG capsule Take 1 capsule by mouth 3 times daily for 30 days. 5/8/25 6/7/25  Yamile Felix Jr., MD   Lancets (ONETOUCH DELICA PLUS [id number]) MISC USE 1 LANCET TO CHECK GLUCOSE TWICE DAILY 5/5/25   Heather Licona MD   baclofen (LIORESAL) 10 MG tablet TAKE 2 TABLETS BY MOUTH IN THE MORNING AND 2 TABLETS IN THE EVENING 4/2/25   Heather Licona MD   montelukast (SINGULAIR) 10 MG tablet Take 1 tablet by mouth nightly 1/16/25   Heather Licona MD   valACYclovir (VALTREX) 1 g tablet TAKE 2 TABLETS BY MOUTH TWICE DAILY FOR 2 DAYS AT THE ONSET OF THE COLD SORE 12/2/24   Heather Licona MD   pantoprazole (PROTONIX) 40 MG tablet Take 1 tablet by mouth daily 5/28/24   Heather Licona MD   Blood Glucose Monitoring Suppl (ONE TOUCH ULTRA 2) w/Device KIT Test sugar once daily 6/2/23   Heather Licona MD   clomiPHENE (CLOMID) 50 MG tablet Take 1 tablet by mouth daily 3/21/22   ProviderBrenda MD   sildenafil (VIAGRA) 100 MG tablet Take no more than 1 tablet daily.  Take 30 mins before desired sexual activity 11/22/21   Provider, MD Brenda   blood glucose test strips (EXACTECH TEST) strip Pt testing blood sugar one daily

## 2025-07-02 ENCOUNTER — TELEPHONE (OUTPATIENT)
Dept: FAMILY MEDICINE CLINIC | Age: 65
End: 2025-07-02

## 2025-07-02 NOTE — TELEPHONE ENCOUNTER
Tell pt I reviewed his chart and he just had a lot of labs per Dr Felix and then I did his yearly labs in January so he needs NO blood test before appt with me but be ready to give urine sample at his visit with me and we will send to lab then

## 2025-07-02 NOTE — TELEPHONE ENCOUNTER
Yovanny has check up scheduled for 7/16. He would like to know if he needs labs done prior to his appointment. Please let Yovanny know.    Health Maintenance   Topic Date Due    HIV screen  Never done    Hepatitis C screen  Never done    Shingles vaccine (1 of 2) Never done    COVID-19 Vaccine (4 - 2024-25 season) 09/01/2024    Diabetic foot exam  07/10/2025    Flu vaccine (1) 08/01/2025    Diabetic Alb to Cr ratio (uACR) test  01/08/2026    Lipids  01/08/2026    Depression Monitoring  01/15/2026    A1C test (Diabetic or Prediabetic)  05/05/2026    Diabetic retinal exam  05/29/2026    GFR test (Diabetes, CKD 3-4, OR last GFR 15-59)  06/03/2026    Colorectal Cancer Screen  04/11/2033    DTaP/Tdap/Td vaccine (2 - Td or Tdap) 08/07/2034    Respiratory Syncytial Virus (RSV) Pregnant or age 60 yrs+ (1 - 1-dose 75+ series) 01/15/2037    Annual Wellness Visit (Medicare Advantage)  Completed    Pneumococcal 50+ years Vaccine  Completed    Hepatitis A vaccine  Aged Out    Hepatitis B vaccine  Aged Out    Hib vaccine  Aged Out    Polio vaccine  Aged Out    Meningococcal (ACWY) vaccine  Aged Out    Meningococcal B vaccine  Aged Out    Pneumococcal 0-49 years Vaccine  Discontinued    Lung Cancer Screening &/or Counseling  Discontinued    Prostate Specific Antigen (PSA) Screening or Monitoring  Discontinued             (applicable per patient's age: Cancer Screenings, Depression Screening, Fall Risk Screening, Immunizations)    Hemoglobin A1C (%)   Date Value   05/05/2025 8.1 (H)   01/08/2025 6.8 (H)   08/14/2024 7.3 (H)     AST (U/L)   Date Value   01/08/2025 39     ALT (U/L)   Date Value   01/08/2025 35     BUN (mg/dL)   Date Value   06/03/2025 17      (goal A1C is < 7)   (goal LDL is <100) need 30-50% reduction from baseline     BP Readings from Last 3 Encounters:   06/03/25 130/72   05/13/25 130/80   03/10/25 (!) 146/85    (goal /80)      All Future Testing planned in [facility]:  Lab Frequency Next Occurrence   Initiate PAT

## 2025-07-16 ENCOUNTER — OFFICE VISIT (OUTPATIENT)
Dept: FAMILY MEDICINE CLINIC | Age: 65
End: 2025-07-16
Payer: MEDICARE

## 2025-07-16 ENCOUNTER — HOSPITAL ENCOUNTER (OUTPATIENT)
Age: 65
Discharge: HOME | End: 2025-07-16
Payer: MEDICARE

## 2025-07-16 VITALS
SYSTOLIC BLOOD PRESSURE: 134 MMHG | WEIGHT: 222.2 LBS | BODY MASS INDEX: 33.68 KG/M2 | HEIGHT: 68 IN | HEART RATE: 83 BPM | DIASTOLIC BLOOD PRESSURE: 86 MMHG | OXYGEN SATURATION: 95 %

## 2025-07-16 DIAGNOSIS — J30.89 SEASONAL ALLERGIC RHINITIS DUE TO OTHER ALLERGIC TRIGGER: ICD-10-CM

## 2025-07-16 DIAGNOSIS — E11.9 TYPE 2 DIABETES MELLITUS WITHOUT COMPLICATION, WITHOUT LONG-TERM CURRENT USE OF INSULIN (HCC): Primary | ICD-10-CM

## 2025-07-16 DIAGNOSIS — Z12.5 PROSTATE CANCER SCREENING: ICD-10-CM

## 2025-07-16 LAB
BASOPHILS # BLD: 0.02 K/UL (ref 0–0.2)
BASOPHILS NFR BLD: 0 % (ref 0–2)
EOSINOPHIL # BLD: 0.03 K/UL (ref 0–0.4)
EOSINOPHILS RELATIVE PERCENT: 1 % (ref 0–5)
ERYTHROCYTE [DISTWIDTH] IN BLOOD BY AUTOMATED COUNT: 14.4 % (ref 12.1–15.2)
ESTRADIOL LEVEL: 30 PG/ML (ref 27–52)
HCT VFR BLD AUTO: 38.8 % (ref 41–53)
HGB BLD-MCNC: 13 G/DL (ref 13.5–17.5)
IMM GRANULOCYTES # BLD AUTO: 0.02 K/UL (ref 0–0.3)
IMM GRANULOCYTES NFR BLD: 0 % (ref 0–5)
LYMPHOCYTES NFR BLD: 1.02 K/UL (ref 1–4.8)
LYMPHOCYTES RELATIVE PERCENT: 18 % (ref 13–44)
MCH RBC QN AUTO: 28.8 PG (ref 26–34)
MCHC RBC AUTO-ENTMCNC: 33.5 G/DL (ref 31–37)
MCV RBC AUTO: 85.8 FL (ref 80–100)
MONOCYTES NFR BLD: 0.89 K/UL (ref 0–1)
MONOCYTES NFR BLD: 16 % (ref 5–9)
NEUTROPHILS NFR BLD: 65 % (ref 39–75)
NEUTS SEG NFR BLD: 3.65 K/UL (ref 2.1–6.5)
PLATELET # BLD AUTO: 133 K/UL (ref 140–450)
PMV BLD AUTO: 11.3 FL (ref 6–12)
RBC # BLD AUTO: 4.52 M/UL (ref 4.5–5.9)
SHBG SERPL-SCNC: 33 NMOL/L (ref 19–76)
TESTOST FREE MFR SERPL: 39.5 PG/ML (ref 47–244)
TESTOST SERPL-MCNC: 207 NG/DL (ref 193–740)
WBC OTHER # BLD: 5.6 K/UL (ref 3.5–11)

## 2025-07-16 PROCEDURE — 3052F HG A1C>EQUAL 8.0%<EQUAL 9.0%: CPT | Performed by: FAMILY MEDICINE

## 2025-07-16 PROCEDURE — 36415 COLL VENOUS BLD VENIPUNCTURE: CPT

## 2025-07-16 PROCEDURE — 84403 ASSAY OF TOTAL TESTOSTERONE: CPT

## 2025-07-16 PROCEDURE — 84270 ASSAY OF SEX HORMONE GLOBUL: CPT

## 2025-07-16 PROCEDURE — 85025 COMPLETE CBC W/AUTO DIFF WBC: CPT

## 2025-07-16 PROCEDURE — 82670 ASSAY OF TOTAL ESTRADIOL: CPT

## 2025-07-16 PROCEDURE — 99213 OFFICE O/P EST LOW 20 MIN: CPT | Performed by: FAMILY MEDICINE

## 2025-07-16 RX ORDER — METFORMIN HYDROCHLORIDE 500 MG/1
500 TABLET, EXTENDED RELEASE ORAL
Qty: 30 TABLET | Refills: 5 | Status: SHIPPED | OUTPATIENT
Start: 2025-07-16

## 2025-07-16 ASSESSMENT — ENCOUNTER SYMPTOMS
EYE REDNESS: 0
COUGH: 0
ABDOMINAL PAIN: 0
VOMITING: 0
DIARRHEA: 0
EYE DISCHARGE: 0
NAUSEA: 0
BLOOD IN STOOL: 0
SHORTNESS OF BREATH: 0
TROUBLE SWALLOWING: 0

## 2025-07-16 ASSESSMENT — PATIENT HEALTH QUESTIONNAIRE - PHQ9
SUM OF ALL RESPONSES TO PHQ QUESTIONS 1-9: 0
1. LITTLE INTEREST OR PLEASURE IN DOING THINGS: NOT AT ALL
2. FEELING DOWN, DEPRESSED OR HOPELESS: NOT AT ALL
SUM OF ALL RESPONSES TO PHQ QUESTIONS 1-9: 0

## 2025-07-16 NOTE — PROGRESS NOTES
HPI Notes    Name: Yovanny Akhtar  : 1960        Chief Complaint:     Chief Complaint   Patient presents with    Diabetes     Not taking any medication at this time. Last A1C was done in the hospital 2025 8.8%.     seasonal allergies     Taking montelukast.        History of Present Illness:     Yovanny Akhtar is a 65 y.o.  male who presents with Diabetes (Not taking any medication at this time. Last A1C was done in the hospital 2025 8.8%. ) and seasonal allergies (Taking montelukast. )      Diabetes  He presents for his follow-up diabetic visit. He has type 2 diabetes mellitus. His disease course has been stable. There are no hypoglycemic associated symptoms. Pertinent negatives for hypoglycemia include no dizziness, headaches or tremors. There are no diabetic associated symptoms. Pertinent negatives for diabetes include no chest pain and no fatigue. There are no hypoglycemic complications. Risk factors for coronary artery disease include diabetes mellitus. Current diabetic treatments: pt was trying to do diet control but has been on steroids for shoulder pain and recent back surgery means he is not very active. Again, taking alot of steroids for his shoulder pain recently. Pt seeing ortho  His weight is increasing steadily. He is following a generally healthy diet. He rarely (just had back surgery so not able to do alot) participates in exercise. His home blood glucose trend is increasing steadily (hgba1c 8.8 on 25). Eye exam is current.     Seasonal allergies - chronic but stable and on allegra and singulair   Past Medical History:     Past Medical History:   Diagnosis Date    Back pain     chronic back pain    Insomnia     Osteoarthritis     Pneumonia     walking pneumonia    Sleep apnea     Sleep apnea     patient has inspire implant    Type 2 diabetes mellitus without complication ([facility])       Reviewed all health maintenance requirements and ordered appropriate tests  Health

## 2025-07-16 NOTE — PATIENT INSTRUCTIONS
SURVEY:    You may be receiving a survey from [facility] regarding your visit today.    You may get this in the mail, through your MyChart or in your email.     Please complete the survey to enable us to provide the highest quality of care to you and your family.      Thank you.    Clinical Care Team:        MD Micki Solano RMA                         Triage:         KARLA Smyth    Clerical Team:      Sonia Mac       [place] [phone]           Billing questions: [phone]           Medical Records Request: [phone]

## 2025-07-17 RX ORDER — MONTELUKAST SODIUM 10 MG/1
10 TABLET ORAL NIGHTLY
Qty: 30 TABLET | Refills: 5 | Status: SHIPPED | OUTPATIENT
Start: 2025-07-17

## 2025-07-17 NOTE — TELEPHONE ENCOUNTER
Last visit:  7/16/2025  Next Visit Date:    Future Appointments   Date Time Provider Department Center   7/18/2025  7:20 AM [facility]   1/19/2026  7:00 AM Heather Licona MD [facility] ECC DEP         Medication List:  Prior to Admission medications    Medication Sig Start Date End Date Taking? Authorizing Provider   metFORMIN (GLUCOPHAGE-XR) 500 MG extended release tablet Take 1 tablet by mouth daily (with breakfast) 7/16/25   Heather Licona MD   blood glucose test strips (ONETOUCH ULTRA) strip USE  TO CHECK GLUCOSE ONCE DAILY 6/23/25   Katy De Anda DO   fexofenadine-pseudoephedrine (ALLEGRA-D ALLERGY & CONGESTION)  MG per extended release tablet Take 1 tablet by mouth 2 times daily 5/29/25   Heather Licona MD   tamsulosin (FLOMAX) 0.4 MG capsule Take 1 capsule by mouth once daily 5/23/25   Heather Licona MD   Lancets (ONETOUCH DELICA PLUS [id number]) MISC USE 1 LANCET TO CHECK GLUCOSE TWICE DAILY 5/5/25   Heather Licona MD   baclofen (LIORESAL) 10 MG tablet TAKE 2 TABLETS BY MOUTH IN THE MORNING AND 2 TABLETS IN THE EVENING 4/2/25   Heather Licona MD   montelukast (SINGULAIR) 10 MG tablet Take 1 tablet by mouth nightly 1/16/25   Heather Licona MD   valACYclovir (VALTREX) 1 g tablet TAKE 2 TABLETS BY MOUTH TWICE DAILY FOR 2 DAYS AT THE ONSET OF THE COLD SORE 12/2/24   Heather Licona MD   pantoprazole (PROTONIX) 40 MG tablet Take 1 tablet by mouth daily 5/28/24   Heather Licona MD   Blood Glucose Monitoring Suppl (ONE TOUCH ULTRA 2) w/Device KIT Test sugar once daily  Patient not taking: Reported on 7/16/2025 6/2/23   Heather Licona MD   clomiPHENE (CLOMID) 50 MG tablet Take 1 tablet by mouth daily 3/21/22   ProviderBrenda MD   sildenafil (VIAGRA) 100 MG tablet Take no more than 1 tablet daily.  Take 30 mins before desired sexual activity 11/22/21   Provider, MD Brenda   blood glucose test strips (EXACTECH TEST) strip Pt testing blood

## 2025-07-30 ENCOUNTER — PATIENT MESSAGE (OUTPATIENT)
Dept: FAMILY MEDICINE CLINIC | Age: 65
End: 2025-07-30

## 2025-07-31 RX ORDER — IBUPROFEN 800 MG/1
800 TABLET, FILM COATED ORAL 3 TIMES DAILY PRN
Qty: 90 TABLET | Refills: 5 | Status: SHIPPED | OUTPATIENT
Start: 2025-07-31

## 2025-07-31 NOTE — TELEPHONE ENCOUNTER
Last OV: 7/16/2025 DM  01/15/25 AWV and chronic   Last RX:    Next scheduled apt: 1/19/2026  AWV and chronic           Pt requesting a refill   Medication pending for approval

## 2025-08-20 ENCOUNTER — PATIENT MESSAGE (OUTPATIENT)
Dept: FAMILY MEDICINE CLINIC | Age: 65
End: 2025-08-20

## 2025-08-20 DIAGNOSIS — M25.512 LEFT SHOULDER PAIN, UNSPECIFIED CHRONICITY: Primary | ICD-10-CM

## 2025-08-21 ENCOUNTER — PATIENT MESSAGE (OUTPATIENT)
Dept: FAMILY MEDICINE CLINIC | Age: 65
End: 2025-08-21

## (undated) DEVICE — JELLY LUBRICATING 4OZ FLIP TOP TB E Z

## (undated) DEVICE — ELECTRODE ES AD CRD L15FT DISP FOR PT BELOW 30LB REM

## (undated) DEVICE — TRAP SUCT POLYPECTOMY ST 4 CHMBR

## (undated) DEVICE — FORCEPS BX L240CM JAW DIA2.2MM RAD JAW 4 HOT DISP

## (undated) DEVICE — Device: Brand: DISPOSABLE ELECTROSURGICAL SNARE

## (undated) DEVICE — MEDI-VAC NON-CONDUCTIVE SUCTION TUBING 6MM X 6.1M (20 FT.) L: Brand: CARDINAL HEALTH

## (undated) DEVICE — GOWN,AURORA,NONRNF,XL,30/CS: Brand: MEDLINE